# Patient Record
Sex: FEMALE | Race: BLACK OR AFRICAN AMERICAN | NOT HISPANIC OR LATINO | Employment: FULL TIME | URBAN - METROPOLITAN AREA
[De-identification: names, ages, dates, MRNs, and addresses within clinical notes are randomized per-mention and may not be internally consistent; named-entity substitution may affect disease eponyms.]

---

## 2017-07-10 ENCOUNTER — HOSPITAL ENCOUNTER (EMERGENCY)
Facility: HOSPITAL | Age: 22
Discharge: HOME | End: 2017-07-10
Attending: EMERGENCY MEDICINE
Payer: MEDICAID

## 2017-07-10 VITALS
SYSTOLIC BLOOD PRESSURE: 124 MMHG | HEIGHT: 67 IN | BODY MASS INDEX: 25.11 KG/M2 | WEIGHT: 160 LBS | TEMPERATURE: 99 F | RESPIRATION RATE: 10 BRPM | HEART RATE: 92 BPM | DIASTOLIC BLOOD PRESSURE: 72 MMHG | OXYGEN SATURATION: 100 %

## 2017-07-10 DIAGNOSIS — R11.2 NAUSEA AND VOMITING, INTRACTABILITY OF VOMITING NOT SPECIFIED, UNSPECIFIED VOMITING TYPE: ICD-10-CM

## 2017-07-10 DIAGNOSIS — R51.9 NONINTRACTABLE HEADACHE, UNSPECIFIED CHRONICITY PATTERN, UNSPECIFIED HEADACHE TYPE: Primary | ICD-10-CM

## 2017-07-10 DIAGNOSIS — R19.7 DIARRHEA, UNSPECIFIED TYPE: ICD-10-CM

## 2017-07-10 LAB
B-HCG UR QL: NEGATIVE
CTP QC/QA: YES

## 2017-07-10 PROCEDURE — 99283 EMERGENCY DEPT VISIT LOW MDM: CPT | Mod: 25

## 2017-07-10 PROCEDURE — 81025 URINE PREGNANCY TEST: CPT | Performed by: PHYSICIAN ASSISTANT

## 2017-07-10 PROCEDURE — 25000003 PHARM REV CODE 250: Performed by: PHYSICIAN ASSISTANT

## 2017-07-10 RX ORDER — BUTALBITAL, ACETAMINOPHEN AND CAFFEINE 50; 325; 40 MG/1; MG/1; MG/1
1 TABLET ORAL
Status: COMPLETED | OUTPATIENT
Start: 2017-07-10 | End: 2017-07-10

## 2017-07-10 RX ORDER — ONDANSETRON 8 MG/1
8 TABLET, ORALLY DISINTEGRATING ORAL 3 TIMES DAILY PRN
Qty: 20 TABLET | Refills: 0 | Status: SHIPPED | OUTPATIENT
Start: 2017-07-10 | End: 2018-10-20

## 2017-07-10 RX ORDER — ONDANSETRON 4 MG/1
4 TABLET, ORALLY DISINTEGRATING ORAL
Status: COMPLETED | OUTPATIENT
Start: 2017-07-10 | End: 2017-07-10

## 2017-07-10 RX ORDER — BUTALBITAL, ACETAMINOPHEN AND CAFFEINE 50; 325; 40 MG/1; MG/1; MG/1
1 TABLET ORAL EVERY 4 HOURS PRN
Qty: 12 TABLET | Refills: 0 | Status: SHIPPED | OUTPATIENT
Start: 2017-07-10 | End: 2017-08-09

## 2017-07-10 RX ADMIN — BUTALBITAL, ACETAMINOPHEN, AND CAFFEINE 1 TABLET: 50; 325; 40 TABLET ORAL at 04:07

## 2017-07-10 RX ADMIN — ONDANSETRON 4 MG: 4 TABLET, ORALLY DISINTEGRATING ORAL at 04:07

## 2017-07-10 NOTE — ED PROVIDER NOTES
Encounter Date: 7/10/2017    SCRIBE #1 NOTE: I, Gena Garcia, am scribing for, and in the presence of, EVA Estes.       History     Chief Complaint   Patient presents with    Headache     since yesterday with nausea, diarrhea       07/10/2017 4:24 PM     Chief Complaint: Headache      The patient is a 21 y.o. female with anxiety who presents to the ED concerned for a migraine with an onset of a persistent bilateral temporal HA for the last couple of hours with associated photophobia. She took 2 Ibuprofen with no relief. The patient also endorses nausea, vomiting, and diarrhea since yesterday. She reports a positive sick contact (sister) with the same symptoms but without a fever. Her last menstrual cycle was 2 weeks ago. The patient denies prior similar symptoms, history of HA or migraines fever, abdominal pain, blood in stool, neck pain, or any other symptoms at this time. No SHx noted.      The history is provided by the patient.     Review of patient's allergies indicates:  No Known Allergies  Past Medical History:   Diagnosis Date    Anxiety     Asthma     Eczema      History reviewed. No pertinent surgical history.  History reviewed. No pertinent family history.  Social History   Substance Use Topics    Smoking status: Never Smoker    Smokeless tobacco: Never Used    Alcohol use Not on file     Review of Systems   Constitutional: Negative for chills and fever.   HENT: Negative for nosebleeds.    Eyes: Positive for photophobia. Negative for visual disturbance.   Respiratory: Negative for cough and shortness of breath.    Cardiovascular: Negative for chest pain and palpitations.   Gastrointestinal: Positive for diarrhea, nausea and vomiting. Negative for abdominal pain and blood in stool.   Genitourinary: Negative for dysuria and hematuria.   Musculoskeletal: Negative for back pain and neck pain.   Skin: Negative for rash.   Neurological: Positive for headaches. Negative for seizures and syncope.      Physical Exam     Initial Vitals [07/10/17 1518]   BP Pulse Resp Temp SpO2   124/72 92 10 98.6 °F (37 °C) 100 %      MAP       89.33         Physical Exam    Nursing note and vitals reviewed.  Constitutional: She appears well-developed and well-nourished. She is not diaphoretic. No distress.   HENT:   Head: Normocephalic and atraumatic.   Right Ear: External ear normal.   Left Ear: External ear normal.   Nose: Nose normal.   Mouth/Throat: Oropharynx is clear and moist.   Eyes: Conjunctivae and EOM are normal. Pupils are equal, round, and reactive to light.   Neck: Normal range of motion. Neck supple.   Cardiovascular: Normal rate, regular rhythm, normal heart sounds and intact distal pulses.   Pulmonary/Chest: Breath sounds normal. No respiratory distress. She has no wheezes. She has no rhonchi. She has no rales.   Abdominal: Soft. She exhibits no distension and no mass. There is no tenderness.   No palpable abdominal tenderness noted.   Musculoskeletal: Normal range of motion. She exhibits no edema or tenderness.   Lymphadenopathy:     She has no cervical adenopathy.   Neurological: She is alert and oriented to person, place, and time. She has normal strength. No cranial nerve deficit or sensory deficit. Coordination and gait normal.   No focal neurological deficits noted.  Cranial nerves III through XII grossly intact.  Equal, bilateral rapid alternating movement noted to upper and lower extremities.   Skin: Skin is warm and dry. No rash and no abscess noted. No erythema.       ED Course   Procedures  Labs Reviewed   POCT URINE PREGNANCY           Medical Decision Making:   History:   Old Medical Records: I decided to obtain old medical records.  Differential Diagnosis:   Viral syndrome  Headache  Gastritis  Appendicitis  Clinical Tests:   Lab Tests: Reviewed and Ordered       APC / Resident Notes:   Her symptoms have improved with Fioricet and Zofran here in the emergency department.  She declines IV fluids  and IV medication, stating that it would be difficult for us to find her veins.  Low clinical suspicion for meningitis or acute intracranial process we do not feel any further imaging or testing is necessary at this time.  Her vital signs remained stable.  We feel comfortable discharging her home to follow-up with her primary care provider for reevaluation in the next couple of days as needed.  She is given a prescription for Fioricet and Zofran to take if symptoms recur.  She is given specific return precautions.       Scribe Attestation:   Scribe #1: I performed the above scribed service and the documentation accurately describes the services I performed. I attest to the accuracy of the note.    Attending Attestation:           Physician Attestation for Scribe:  Physician Attestation Statement for Scribe #1: I, EVA Estes, reviewed documentation, as scribed by Gena Garcia in my presence, and it is both accurate and complete.                 ED Course     Clinical Impression:   No diagnosis found.                             Marisela Estes PA-C  07/10/17 4113

## 2019-02-12 ENCOUNTER — HOSPITAL ENCOUNTER (EMERGENCY)
Facility: HOSPITAL | Age: 24
Discharge: HOME | End: 2019-02-12
Attending: EMERGENCY MEDICINE
Payer: MEDICAID

## 2019-02-12 VITALS
DIASTOLIC BLOOD PRESSURE: 78 MMHG | HEART RATE: 100 BPM | RESPIRATION RATE: 22 BRPM | SYSTOLIC BLOOD PRESSURE: 128 MMHG | TEMPERATURE: 98 F | OXYGEN SATURATION: 100 %

## 2019-02-12 DIAGNOSIS — F41.9 ANXIETY: Primary | ICD-10-CM

## 2019-02-12 LAB
B-HCG UR QL: NEGATIVE
CTP QC/QA: YES

## 2019-02-12 PROCEDURE — 25000003 PHARM REV CODE 250: Performed by: PHYSICIAN ASSISTANT

## 2019-02-12 PROCEDURE — 99283 EMERGENCY DEPT VISIT LOW MDM: CPT | Mod: 25

## 2019-02-12 PROCEDURE — 99284 PR EMERGENCY DEPT VISIT,LEVEL IV: ICD-10-PCS | Mod: ,,, | Performed by: PHYSICIAN ASSISTANT

## 2019-02-12 PROCEDURE — 81025 URINE PREGNANCY TEST: CPT | Performed by: PHYSICIAN ASSISTANT

## 2019-02-12 PROCEDURE — 99284 EMERGENCY DEPT VISIT MOD MDM: CPT | Mod: ,,, | Performed by: PHYSICIAN ASSISTANT

## 2019-02-12 RX ORDER — HYDROXYZINE PAMOATE 25 MG/1
25 CAPSULE ORAL
Status: COMPLETED | OUTPATIENT
Start: 2019-02-12 | End: 2019-02-12

## 2019-02-12 RX ADMIN — HYDROXYZINE PAMOATE 25 MG: 25 CAPSULE ORAL at 04:02

## 2019-02-12 NOTE — ED TRIAGE NOTES
Allison Shaikh, a 23 y.o. female presents to the ED w/ complaint of anxiety. Pt was at home sleeping when she awoke and became short of breath. Pt became extremely anxious and has not been able to calm down. Pt complains of mild SOB. Denies CP, SI, HI.     Triage note:  Chief Complaint   Patient presents with    Anxiety     Pt presents to ED via EMS c/o feeling anxious. Pt has been unable to fill her script for her anxiety medication.      Review of patient's allergies indicates:  No Known Allergies  Past Medical History:   Diagnosis Date    Anxiety     Asthma     Eczema

## 2019-02-12 NOTE — ED PROVIDER NOTES
Encounter Date: 2/12/2019       History     Chief Complaint   Patient presents with    Anxiety     Pt presents to ED via EMS c/o feeling anxious. Pt has been unable to fill her script for her anxiety medication.      Patient is a 23 year old female with PMHx of asthma, eczema, and anxiety. She presents to the ED via EMS for anxiety. She reports having a panic attack onset two hours ago. Denies SI, HI, or AVH. Denies hx of inpatient hospitalizations. She denies fever,chills, nausea, vomiting, sob, chest pain, abd pain, dysuria, diarrhea, or constipation. She is a non smoker and denies alcohol use.          Review of patient's allergies indicates:  No Known Allergies  Past Medical History:   Diagnosis Date    Anxiety     Asthma     Eczema      History reviewed. No pertinent surgical history.  History reviewed. No pertinent family history.  Social History     Tobacco Use    Smoking status: Never Smoker    Smokeless tobacco: Never Used   Substance Use Topics    Alcohol use: No     Frequency: Never    Drug use: No     Review of Systems   Constitutional: Negative for fever.   HENT: Negative for sore throat.    Respiratory: Negative for shortness of breath.    Cardiovascular: Negative for chest pain.   Gastrointestinal: Negative for abdominal pain, nausea and vomiting.   Genitourinary: Negative for dysuria.   Musculoskeletal: Negative for back pain.   Skin: Negative for rash.   Neurological: Negative for weakness.   Hematological: Does not bruise/bleed easily.   Psychiatric/Behavioral: The patient is nervous/anxious.        Physical Exam     Initial Vitals [02/12/19 0308]   BP Pulse Resp Temp SpO2   128/78 100 (!) 22 98.4 °F (36.9 °C) 100 %      MAP       --         Physical Exam    Vitals reviewed.  Constitutional: She appears well-developed and well-nourished. No distress.   HENT:   Head: Normocephalic.   Eyes: Conjunctivae are normal.   Neck: Normal range of motion.   Cardiovascular: Normal rate and regular  rhythm.   No murmur heard.  Pulmonary/Chest: Breath sounds normal. No respiratory distress. She has no wheezes. She has no rales.   Musculoskeletal: Normal range of motion.   Neurological: She is alert and oriented to person, place, and time.   Skin: Skin is warm and dry. No erythema.   Psychiatric: Her speech is normal and behavior is normal. Thought content normal. Her mood appears anxious. She expresses no homicidal and no suicidal ideation. She expresses no suicidal plans and no homicidal plans.         ED Course   Procedures  Labs Reviewed   POCT URINE PREGNANCY                   APC / Resident Notes:   Patient is a 23 year old female presents to the ED for emergent evaluation of anxiety.     Will order anti-anxiety medication for symptomatic relief. UPT negative. Will continue to monitor.     Differential diagnoses include, but are not limited to: generalized anxiety disorder, panic disorder, asthma, or pregnancy.     Patient reassessed, reports symptoms improved with ED management. I have discussed emergency department findings, and plan with the patient. Will discharge home with F/U with PCP. Patient verbalizes understanding of plan and agrees. Return precautions given.     I have discussed and reviewed with my supervising physician.        Clinical Impression:   The encounter diagnosis was Anxiety.      Disposition:   Disposition: Discharged  Condition: Stable                        Marycruz Rush PA-C  02/12/19 9117

## 2019-10-11 ENCOUNTER — HOSPITAL ENCOUNTER (EMERGENCY)
Facility: HOSPITAL | Age: 24
Discharge: HOME | End: 2019-10-11
Attending: EMERGENCY MEDICINE

## 2019-10-11 VITALS
DIASTOLIC BLOOD PRESSURE: 76 MMHG | WEIGHT: 155 LBS | TEMPERATURE: 98 F | HEART RATE: 68 BPM | BODY MASS INDEX: 24.91 KG/M2 | OXYGEN SATURATION: 100 % | RESPIRATION RATE: 16 BRPM | HEIGHT: 66 IN | SYSTOLIC BLOOD PRESSURE: 114 MMHG

## 2019-10-11 DIAGNOSIS — J02.9 VIRAL PHARYNGITIS: Primary | ICD-10-CM

## 2019-10-11 LAB
B-HCG UR QL: NEGATIVE
CTP QC/QA: YES
DEPRECATED S PYO AG THROAT QL EIA: NEGATIVE
INFLUENZA A, MOLECULAR: NEGATIVE
INFLUENZA B, MOLECULAR: NEGATIVE
SPECIMEN SOURCE: NORMAL

## 2019-10-11 PROCEDURE — 87081 CULTURE SCREEN ONLY: CPT

## 2019-10-11 PROCEDURE — 81025 URINE PREGNANCY TEST: CPT | Performed by: EMERGENCY MEDICINE

## 2019-10-11 PROCEDURE — 87502 INFLUENZA DNA AMP PROBE: CPT

## 2019-10-11 PROCEDURE — 99282 EMERGENCY DEPT VISIT SF MDM: CPT

## 2019-10-11 PROCEDURE — 87880 STREP A ASSAY W/OPTIC: CPT

## 2019-10-11 PROCEDURE — 25000003 PHARM REV CODE 250: Performed by: EMERGENCY MEDICINE

## 2019-10-11 RX ORDER — ACETAMINOPHEN 500 MG
1000 TABLET ORAL
Status: COMPLETED | OUTPATIENT
Start: 2019-10-11 | End: 2019-10-11

## 2019-10-11 RX ADMIN — ACETAMINOPHEN 1000 MG: 500 TABLET, FILM COATED ORAL at 10:10

## 2019-10-11 NOTE — ED PROVIDER NOTES
Encounter Date: 10/11/2019       History     Chief Complaint   Patient presents with    Sore Throat     [place]     24-year-old female presented emergency department with complaints of sore throat and headache which started last night.  Patient denies fever or chills or nausea or vomiting or chest pain or shortness of breath. Patient denies any neck stiffness or photophobia.  Patient describes this as a moderate headache which is gradual onset.  Denies any fever.  Denies abdominal pain or dysuria or hematuria or weakness or numbness.        Review of patient's allergies indicates:  No Known Allergies  Past Medical History:   Diagnosis Date    Anxiety     Asthma     Eczema      History reviewed. No pertinent surgical history.  No family history on file.  Social History     Tobacco Use    Smoking status: Never Smoker    Smokeless tobacco: Never Used   Substance Use Topics    Alcohol use: No     Frequency: Never    Drug use: No     Review of Systems   Constitutional: Negative.    HENT: Positive for sore throat.    Eyes: Negative.    Respiratory: Negative.    Cardiovascular: Negative.  Negative for chest pain.   Gastrointestinal: Negative.    Endocrine: Negative.    Genitourinary: Negative.    Musculoskeletal: Negative.    Skin: Negative.    Allergic/Immunologic: Negative.    Neurological: Positive for headaches. Negative for dizziness, tremors, seizures, syncope, facial asymmetry, speech difficulty and weakness.   Hematological: Negative.    Psychiatric/Behavioral: Negative.    All other systems reviewed and are negative.      Physical Exam     Initial Vitals [10/11/19 0840]   BP Pulse Resp Temp SpO2   110/76 76 16 99 °F (37.2 °C) 100 %      MAP       --         Physical Exam    Nursing note and vitals reviewed.  Constitutional: She appears well-developed and well-nourished.   HENT:   Head: Normocephalic and atraumatic.   Nose: Nose normal.   Mouth/Throat: Oropharyngeal exudate present.   Bilateral  tonsillar exudates   Eyes: Conjunctivae and EOM are normal. Pupils are equal, round, and reactive to light.   Neck: Normal range of motion. Neck supple. No thyromegaly present. No tracheal deviation present. No JVD present.   Cardiovascular: Normal rate, regular rhythm, normal heart sounds and intact distal pulses.   No murmur heard.  Pulmonary/Chest: Breath sounds normal. No stridor. No respiratory distress. She has no wheezes. She has no rales.   Abdominal: Soft. Bowel sounds are normal. She exhibits no distension. There is no tenderness.   Musculoskeletal: Normal range of motion. She exhibits no edema.   Neurological: She is alert and oriented to person, place, and time. She has normal strength. No cranial nerve deficit or sensory deficit. GCS score is 15. GCS eye subscore is 4. GCS verbal subscore is 5. GCS motor subscore is 6.   No photophobia.  No neck stiffness no meningeal signs   Skin: Skin is warm and dry. Capillary refill takes less than 2 seconds.   Psychiatric: She has a normal mood and affect. Thought content normal.         ED Course   Procedures  Labs Reviewed   THROAT SCREEN, RAPID   INFLUENZA A AND B ANTIGEN   POCT URINE PREGNANCY          Imaging Results    None          Medical Decision Making:   Differential Diagnosis:   24-year-old female presented emergency department with pharyngitis.  Presentation consistent with viral pharyngitis with mild headache.  Symptoms resolved with the treatment.  Strep screen and flu screen negative. Discharged with instructions and follow-up  Clinical Tests:   Lab Tests: Reviewed                      Clinical Impression:       ICD-10-CM ICD-9-CM   1. Viral pharyngitis J02.9 462                                Carlos Daniels MD  10/11/19 1211

## 2019-10-13 LAB — BACTERIA THROAT CULT: NORMAL

## 2019-12-18 ENCOUNTER — HOSPITAL ENCOUNTER (EMERGENCY)
Facility: HOSPITAL | Age: 24
Discharge: HOME | End: 2019-12-19
Attending: EMERGENCY MEDICINE

## 2019-12-18 DIAGNOSIS — R10.9 ABDOMINAL PAIN, UNSPECIFIED ABDOMINAL LOCATION: Primary | ICD-10-CM

## 2019-12-18 DIAGNOSIS — R11.2 NON-INTRACTABLE VOMITING WITH NAUSEA, UNSPECIFIED VOMITING TYPE: ICD-10-CM

## 2019-12-18 LAB
ALBUMIN SERPL BCP-MCNC: 4.2 G/DL (ref 3.5–5.2)
ALP SERPL-CCNC: 73 U/L (ref 55–135)
ALT SERPL W/O P-5'-P-CCNC: 12 U/L (ref 10–44)
ANION GAP SERPL CALC-SCNC: 8 MMOL/L (ref 8–16)
AST SERPL-CCNC: 20 U/L (ref 10–40)
B-HCG UR QL: NEGATIVE
BACTERIA #/AREA URNS HPF: NEGATIVE /HPF
BASOPHILS # BLD AUTO: 0.03 K/UL (ref 0–0.2)
BASOPHILS NFR BLD: 0.2 % (ref 0–1.9)
BILIRUB SERPL-MCNC: 0.6 MG/DL (ref 0.1–1)
BILIRUB UR QL STRIP: NEGATIVE
BUN SERPL-MCNC: 7 MG/DL (ref 6–20)
CALCIUM SERPL-MCNC: 8.6 MG/DL (ref 8.7–10.5)
CHLORIDE SERPL-SCNC: 104 MMOL/L (ref 95–110)
CLARITY UR: CLEAR
CO2 SERPL-SCNC: 25 MMOL/L (ref 23–29)
COLOR UR: YELLOW
CREAT SERPL-MCNC: 0.7 MG/DL (ref 0.5–1.4)
CTP QC/QA: YES
DIFFERENTIAL METHOD: ABNORMAL
EOSINOPHIL # BLD AUTO: 0 K/UL (ref 0–0.5)
EOSINOPHIL NFR BLD: 0.3 % (ref 0–8)
ERYTHROCYTE [DISTWIDTH] IN BLOOD BY AUTOMATED COUNT: 17.2 % (ref 11.5–14.5)
EST. GFR  (AFRICAN AMERICAN): >60 ML/MIN/1.73 M^2
EST. GFR  (NON AFRICAN AMERICAN): >60 ML/MIN/1.73 M^2
GLUCOSE SERPL-MCNC: 77 MG/DL (ref 70–110)
GLUCOSE UR QL STRIP: NEGATIVE
HCT VFR BLD AUTO: 32.6 % (ref 37–48.5)
HGB BLD-MCNC: 9.7 G/DL (ref 12–16)
HGB UR QL STRIP: NEGATIVE
HYALINE CASTS #/AREA URNS LPF: 6 /LPF
IMM GRANULOCYTES # BLD AUTO: 0.04 K/UL (ref 0–0.04)
IMM GRANULOCYTES NFR BLD AUTO: 0.3 % (ref 0–0.5)
KETONES UR QL STRIP: NEGATIVE
LEUKOCYTE ESTERASE UR QL STRIP: ABNORMAL
LIPASE SERPL-CCNC: 23 U/L (ref 4–60)
LYMPHOCYTES # BLD AUTO: 1.4 K/UL (ref 1–4.8)
LYMPHOCYTES NFR BLD: 10.4 % (ref 18–48)
MCH RBC QN AUTO: 20.8 PG (ref 27–31)
MCHC RBC AUTO-ENTMCNC: 29.8 G/DL (ref 32–36)
MCV RBC AUTO: 70 FL (ref 82–98)
MICROSCOPIC COMMENT: ABNORMAL
MONOCYTES # BLD AUTO: 0.9 K/UL (ref 0.3–1)
MONOCYTES NFR BLD: 6.7 % (ref 4–15)
NEUTROPHILS # BLD AUTO: 11.2 K/UL (ref 1.8–7.7)
NEUTROPHILS NFR BLD: 82.1 % (ref 38–73)
NITRITE UR QL STRIP: NEGATIVE
NRBC BLD-RTO: 0 /100 WBC
PH UR STRIP: 6 [PH] (ref 5–8)
PLATELET # BLD AUTO: 340 K/UL (ref 150–350)
PMV BLD AUTO: 11.1 FL (ref 9.2–12.9)
POTASSIUM SERPL-SCNC: 3.4 MMOL/L (ref 3.5–5.1)
PROT SERPL-MCNC: 8.4 G/DL (ref 6–8.4)
PROT UR QL STRIP: ABNORMAL
RBC # BLD AUTO: 4.67 M/UL (ref 4–5.4)
RBC #/AREA URNS HPF: 1 /HPF (ref 0–4)
SODIUM SERPL-SCNC: 137 MMOL/L (ref 136–145)
SP GR UR STRIP: 1.03 (ref 1–1.03)
SQUAMOUS #/AREA URNS HPF: 6 /HPF
URN SPEC COLLECT METH UR: ABNORMAL
UROBILINOGEN UR STRIP-ACNC: NEGATIVE EU/DL
WBC # BLD AUTO: 13.67 K/UL (ref 3.9–12.7)
WBC #/AREA URNS HPF: 6 /HPF (ref 0–5)

## 2019-12-18 PROCEDURE — 96375 TX/PRO/DX INJ NEW DRUG ADDON: CPT

## 2019-12-18 PROCEDURE — 99285 EMERGENCY DEPT VISIT HI MDM: CPT | Mod: 25

## 2019-12-18 PROCEDURE — 63600175 PHARM REV CODE 636 W HCPCS: Performed by: NURSE PRACTITIONER

## 2019-12-18 PROCEDURE — 25500020 PHARM REV CODE 255: Performed by: NURSE PRACTITIONER

## 2019-12-18 PROCEDURE — 81001 URINALYSIS AUTO W/SCOPE: CPT

## 2019-12-18 PROCEDURE — 85025 COMPLETE CBC W/AUTO DIFF WBC: CPT

## 2019-12-18 PROCEDURE — 81025 URINE PREGNANCY TEST: CPT | Performed by: NURSE PRACTITIONER

## 2019-12-18 PROCEDURE — 96374 THER/PROPH/DIAG INJ IV PUSH: CPT

## 2019-12-18 PROCEDURE — 83690 ASSAY OF LIPASE: CPT

## 2019-12-18 PROCEDURE — 80053 COMPREHEN METABOLIC PANEL: CPT

## 2019-12-18 RX ORDER — HYDROMORPHONE HYDROCHLORIDE 1 MG/ML
1 INJECTION, SOLUTION INTRAMUSCULAR; INTRAVENOUS; SUBCUTANEOUS
Status: COMPLETED | OUTPATIENT
Start: 2019-12-18 | End: 2019-12-18

## 2019-12-18 RX ORDER — ONDANSETRON 2 MG/ML
4 INJECTION INTRAMUSCULAR; INTRAVENOUS
Status: COMPLETED | OUTPATIENT
Start: 2019-12-18 | End: 2019-12-18

## 2019-12-18 RX ADMIN — HYDROMORPHONE HYDROCHLORIDE 1 MG: 1 INJECTION, SOLUTION INTRAMUSCULAR; INTRAVENOUS; SUBCUTANEOUS at 08:12

## 2019-12-18 RX ADMIN — ONDANSETRON 4 MG: 2 INJECTION INTRAMUSCULAR; INTRAVENOUS at 08:12

## 2019-12-18 RX ADMIN — IOHEXOL 100 ML: 350 INJECTION, SOLUTION INTRAVENOUS at 10:12

## 2019-12-19 VITALS
TEMPERATURE: 98 F | OXYGEN SATURATION: 100 % | HEART RATE: 96 BPM | SYSTOLIC BLOOD PRESSURE: 103 MMHG | DIASTOLIC BLOOD PRESSURE: 59 MMHG

## 2019-12-19 PROCEDURE — 25000003 PHARM REV CODE 250: Performed by: EMERGENCY MEDICINE

## 2019-12-19 PROCEDURE — 63600175 PHARM REV CODE 636 W HCPCS: Performed by: EMERGENCY MEDICINE

## 2019-12-19 PROCEDURE — 96376 TX/PRO/DX INJ SAME DRUG ADON: CPT

## 2019-12-19 RX ORDER — ONDANSETRON 4 MG/1
4 TABLET, ORALLY DISINTEGRATING ORAL EVERY 8 HOURS PRN
Qty: 12 TABLET | Refills: 0 | Status: ON HOLD | OUTPATIENT
Start: 2019-12-19 | End: 2022-01-13 | Stop reason: HOSPADM

## 2019-12-19 RX ORDER — ONDANSETRON 2 MG/ML
4 INJECTION INTRAMUSCULAR; INTRAVENOUS
Status: COMPLETED | OUTPATIENT
Start: 2019-12-19 | End: 2019-12-19

## 2019-12-19 RX ADMIN — ONDANSETRON 4 MG: 2 INJECTION INTRAMUSCULAR; INTRAVENOUS at 12:12

## 2019-12-19 RX ADMIN — ALUMINUM HYDROXIDE, MAGNESIUM HYDROXIDE, AND SIMETHICONE 50 ML: 200; 200; 20 SUSPENSION ORAL at 12:12

## 2019-12-19 NOTE — ED PROVIDER NOTES
Encounter Date: 12/18/2019       History     Chief Complaint   Patient presents with    Abdominal Pain     Lower abdominal pain since this morning.     Presents with complaint of generalized abdominal pain  Onset this AM  Denies NVD or fever         Review of patient's allergies indicates:  No Known Allergies  Past Medical History:   Diagnosis Date    Anxiety     Asthma     Eczema      No past surgical history on file.  No family history on file.  Social History     Tobacco Use    Smoking status: Never Smoker    Smokeless tobacco: Never Used   Substance Use Topics    Alcohol use: No     Frequency: Never    Drug use: No     Review of Systems   Constitutional: Negative for fever.   Respiratory: Negative for cough, shortness of breath and wheezing.    Cardiovascular: Negative for chest pain, palpitations and leg swelling.   Gastrointestinal: Positive for abdominal pain. Negative for constipation, diarrhea, nausea and vomiting.   Genitourinary: Negative for dysuria.   Musculoskeletal: Negative for back pain.   Skin: Negative for rash.   Neurological: Negative for weakness.       Physical Exam     Initial Vitals   BP Pulse Resp Temp SpO2   12/18/19 1923 12/18/19 1923 12/18/19 1923 12/18/19 1923 12/18/19 2102   (P) 115/75 (P) 96 (P) 18 (P) 99.1 °F (37.3 °C) 100 %      MAP       --                Physical Exam    Constitutional: She appears well-developed and well-nourished.   HENT:   Head: Normocephalic and atraumatic.   Eyes: Conjunctivae are normal.   Neck: Normal range of motion.   Cardiovascular: Normal rate and regular rhythm.   Pulmonary/Chest: Breath sounds normal. No respiratory distress.   Abdominal: Soft. Bowel sounds are normal. She exhibits no distension. There is tenderness. There is rebound.   Musculoskeletal: Normal range of motion.   Neurological: She is alert and oriented to person, place, and time. No sensory deficit. GCS score is 15. GCS eye subscore is 4. GCS verbal subscore is 5. GCS motor  subscore is 6.   Skin: Skin is warm and dry.   Psychiatric: She has a normal mood and affect. Thought content normal.         ED Course   Procedures  Labs Reviewed   CBC W/ AUTO DIFFERENTIAL - Abnormal; Notable for the following components:       Result Value    WBC 13.67 (*)     Hemoglobin 9.7 (*)     Hematocrit 32.6 (*)     Mean Corpuscular Volume 70 (*)     Mean Corpuscular Hemoglobin 20.8 (*)     Mean Corpuscular Hemoglobin Conc 29.8 (*)     RDW 17.2 (*)     Gran # (ANC) 11.2 (*)     Gran% 82.1 (*)     Lymph% 10.4 (*)     All other components within normal limits   COMPREHENSIVE METABOLIC PANEL - Abnormal; Notable for the following components:    Potassium 3.4 (*)     Calcium 8.6 (*)     All other components within normal limits   URINALYSIS, REFLEX TO URINE CULTURE - Abnormal; Notable for the following components:    Protein, UA Trace (*)     Leukocytes, UA Trace (*)     All other components within normal limits    Narrative:     Preferred Collection Type->Urine, Clean Catch  Specimen Source->Urine   URINALYSIS MICROSCOPIC - Abnormal; Notable for the following components:    WBC, UA 6 (*)     Hyaline Casts, UA 6 (*)     All other components within normal limits    Narrative:     Preferred Collection Type->Urine, Clean Catch  Specimen Source->Urine   LIPASE   POCT URINE PREGNANCY          Imaging Results          CT Abdomen Pelvis With Contrast (Final result)  Result time 12/18/19 22:17:55    Final result by Andrés Lopez MD (12/18/19 22:17:55)                 Narrative:        Exam: CT OF THE ABDOMEN/PELVIS WITH IV AND RECTAL CONTRAST    Clinical data: Abdominal pain.    Technique: Direct contiguous axial CT images were acquired through the abdomen  and pelvis with intravenous contrast using soft tissue and bone algorithms.  Rectal contrast was administered. Reformatted/MPR images were performed.  Contrast used: Omnipaque 350. Amount: 100 mL.  Radiation dose:  CTDIvol = 6.50  mGy, DLP = 346.20 mGy x  cm.    Limitations: None.    Prior studies: No prior studies submitted.    Findings: Lung bases:  Clear.    Liver:   Mildly enlarged in size, measuring 16 cm in greatest craniocaudal  dimension.  Unremarkable contour. Normal density. No evidence of mass. No  evidence of dilated ducts.    Gallbladder:  Unremarkable    Spleen:  Grossly unremarkable.    Pancreas/adrenal glands:   Grossly unremarkable size, contour and density.    Kidneys:   In anatomic position. Grossly unremarkable renal size, contour and  density. No renal or ureteral calculi. No evidence of a renal mass or cyst.  Perinephric space is unremarkable.    Retroperitoneum: No enlarged retroperitoneal lymphadenopathy. The aorta and IVC  appear unremarkable.    Peritoneal cavity:  No evidence of free air or ascites.    Gastrointestinal tract: No obstruction.    Appendix:  Unremarkable    Pelvis: A cystic lesion is identified in the left ovary, measuring 2.3 cm.  Otherwise the solid and hollow viscera grossly unremarkable.    Osseous structures:  No acute or destructive bony process identified.    IMPRESSION:    1.  No acute intra-abdominal pathology.    2.  Mild hepatomegaly.    3.  Cystic lesion in the left ovary, likely representing a dominant  follicle/simple cyst.  Correlation with ultrasound may be performed.      Recommendation: Follow up as clinically indicated.    All CT scans at this facility utilize dose modulation, iterative reconstruction,  and/or weight based dosing when appropriate to reduce radiation dose to as low  as reasonably achievable.      Electronically Signed by MONI MUÑOZ MD at 12/19/2019 12:52:11 AM                               Medical Decision Making:   Initial Assessment:   Generalized abdominal pain without NVD or fever  Transition of care to Dr. Golden              [place] of Care:  Assumed care from NP pending CTAP.  Agree with their assessment and plan unless otherwise noted. Reassessed.  Patient lying in bed in no  acute distress. States she feels better and is tolerating p.o..  Abdomen soft, mild epigastric tenderness. No Romano's.  Lab showed WBC 13.7.  BUN 7, creatinine 0.7.  Lipase 23.  UA shows RBC 1, WBC 6, bacteria negative. CT AP shows no acute abnormality.  Cyst in left ovary.  Suspect gastritis.  Low suspicion for bacterial infection, cholecystitis, or early/occult appendicitis.  Will prescribe Zofran.  Recommend follow-up with PCP for repeat evaluation.  Given return precautions.  Patient understands the plan.    Peter Golden MD  Emergency Medicine  12/19/2019 1:30 AM                        Clinical Impression:       ICD-10-CM ICD-9-CM   1. Abdominal pain, unspecified abdominal location R10.9 789.00   2. Non-intractable vomiting with nausea, unspecified vomiting type R11.2 787.01                             Peter Golden MD  12/19/19 0133

## 2020-02-22 ENCOUNTER — HOSPITAL ENCOUNTER (EMERGENCY)
Facility: HOSPITAL | Age: 25
Discharge: HOME | End: 2020-02-22
Attending: EMERGENCY MEDICINE

## 2020-02-22 VITALS
OXYGEN SATURATION: 100 % | HEIGHT: 67 IN | DIASTOLIC BLOOD PRESSURE: 75 MMHG | BODY MASS INDEX: 25.11 KG/M2 | TEMPERATURE: 99 F | RESPIRATION RATE: 18 BRPM | WEIGHT: 160 LBS | HEART RATE: 77 BPM | SYSTOLIC BLOOD PRESSURE: 121 MMHG

## 2020-02-22 DIAGNOSIS — D64.9 ANEMIA, UNSPECIFIED TYPE: ICD-10-CM

## 2020-02-22 DIAGNOSIS — R52 BODY ACHES: ICD-10-CM

## 2020-02-22 DIAGNOSIS — R05.9 COUGH: Primary | ICD-10-CM

## 2020-02-22 LAB
ANION GAP SERPL CALC-SCNC: 10 MMOL/L (ref 8–16)
B-HCG UR QL: NEGATIVE
BASOPHILS # BLD AUTO: 0.06 K/UL (ref 0–0.2)
BASOPHILS NFR BLD: 1.1 % (ref 0–1.9)
BILIRUB UR QL STRIP: NEGATIVE
BUN SERPL-MCNC: 11 MG/DL (ref 6–20)
CALCIUM SERPL-MCNC: 8.6 MG/DL (ref 8.7–10.5)
CHLORIDE SERPL-SCNC: 101 MMOL/L (ref 95–110)
CLARITY UR: CLEAR
CO2 SERPL-SCNC: 26 MMOL/L (ref 23–29)
COLOR UR: YELLOW
CREAT SERPL-MCNC: 0.6 MG/DL (ref 0.5–1.4)
CTP QC/QA: YES
DIFFERENTIAL METHOD: ABNORMAL
EOSINOPHIL # BLD AUTO: 0.1 K/UL (ref 0–0.5)
EOSINOPHIL NFR BLD: 1.1 % (ref 0–8)
ERYTHROCYTE [DISTWIDTH] IN BLOOD BY AUTOMATED COUNT: 18.8 % (ref 11.5–14.5)
EST. GFR  (AFRICAN AMERICAN): >60 ML/MIN/1.73 M^2
EST. GFR  (NON AFRICAN AMERICAN): >60 ML/MIN/1.73 M^2
GLUCOSE SERPL-MCNC: 85 MG/DL (ref 70–110)
GLUCOSE UR QL STRIP: NEGATIVE
HCT VFR BLD AUTO: 31 % (ref 37–48.5)
HGB BLD-MCNC: 9.3 G/DL (ref 12–16)
HGB UR QL STRIP: NEGATIVE
IMM GRANULOCYTES # BLD AUTO: 0.01 K/UL (ref 0–0.04)
IMM GRANULOCYTES NFR BLD AUTO: 0.2 % (ref 0–0.5)
INFLUENZA A, MOLECULAR: NEGATIVE
INFLUENZA B, MOLECULAR: NEGATIVE
KETONES UR QL STRIP: NEGATIVE
LEUKOCYTE ESTERASE UR QL STRIP: NEGATIVE
LYMPHOCYTES # BLD AUTO: 2 K/UL (ref 1–4.8)
LYMPHOCYTES NFR BLD: 37.7 % (ref 18–48)
MCH RBC QN AUTO: 21.3 PG (ref 27–31)
MCHC RBC AUTO-ENTMCNC: 30 G/DL (ref 32–36)
MCV RBC AUTO: 71 FL (ref 82–98)
MONOCYTES # BLD AUTO: 0.6 K/UL (ref 0.3–1)
MONOCYTES NFR BLD: 10.9 % (ref 4–15)
NEUTROPHILS # BLD AUTO: 2.6 K/UL (ref 1.8–7.7)
NEUTROPHILS NFR BLD: 49 % (ref 38–73)
NITRITE UR QL STRIP: NEGATIVE
NRBC BLD-RTO: 0 /100 WBC
PH UR STRIP: 8 [PH] (ref 5–8)
PLATELET # BLD AUTO: 322 K/UL (ref 150–350)
PMV BLD AUTO: 10.4 FL (ref 9.2–12.9)
POTASSIUM SERPL-SCNC: 3.9 MMOL/L (ref 3.5–5.1)
PROT UR QL STRIP: ABNORMAL
RBC # BLD AUTO: 4.37 M/UL (ref 4–5.4)
SODIUM SERPL-SCNC: 137 MMOL/L (ref 136–145)
SP GR UR STRIP: >1.03 (ref 1–1.03)
SPECIMEN SOURCE: NORMAL
URN SPEC COLLECT METH UR: ABNORMAL
UROBILINOGEN UR STRIP-ACNC: ABNORMAL EU/DL
WBC # BLD AUTO: 5.3 K/UL (ref 3.9–12.7)

## 2020-02-22 PROCEDURE — 96360 HYDRATION IV INFUSION INIT: CPT

## 2020-02-22 PROCEDURE — 63600175 PHARM REV CODE 636 W HCPCS: Performed by: PHYSICIAN ASSISTANT

## 2020-02-22 PROCEDURE — 99284 EMERGENCY DEPT VISIT MOD MDM: CPT | Mod: 25

## 2020-02-22 PROCEDURE — 81003 URINALYSIS AUTO W/O SCOPE: CPT

## 2020-02-22 PROCEDURE — 80048 BASIC METABOLIC PNL TOTAL CA: CPT

## 2020-02-22 PROCEDURE — 81025 URINE PREGNANCY TEST: CPT | Performed by: PHYSICIAN ASSISTANT

## 2020-02-22 PROCEDURE — 87502 INFLUENZA DNA AMP PROBE: CPT

## 2020-02-22 PROCEDURE — 85025 COMPLETE CBC W/AUTO DIFF WBC: CPT

## 2020-02-22 RX ORDER — FERROUS SULFATE 325(65) MG
325 TABLET ORAL DAILY
Qty: 30 TABLET | Refills: 0 | Status: ON HOLD | OUTPATIENT
Start: 2020-02-22 | End: 2022-01-13 | Stop reason: HOSPADM

## 2020-02-22 RX ORDER — SODIUM CHLORIDE 9 MG/ML
1000 INJECTION, SOLUTION INTRAVENOUS
Status: COMPLETED | OUTPATIENT
Start: 2020-02-22 | End: 2020-02-22

## 2020-02-22 RX ADMIN — SODIUM CHLORIDE 1000 ML: 900 INJECTION INTRAVENOUS at 08:02

## 2020-02-23 NOTE — ED PROVIDER NOTES
Encounter Date: 2/22/2020       History     Chief Complaint   Patient presents with    Generalized Body Aches     X 1 DAY    Cough     25 yo female presenting with complaint of cough congestion and abdominal cramping also states some urinary frequency.  Patient denies any nausea vomiting diarrhea.  Patient denies any urinary symptoms denies any vaginal bleeding, States some congestion cough and body ache and abdominal cramping.          Review of patient's allergies indicates:  No Known Allergies  Past Medical History:   Diagnosis Date    Anxiety     Asthma     Eczema      No past surgical history on file.  No family history on file.  Social History     Tobacco Use    Smoking status: Never Smoker    Smokeless tobacco: Never Used   Substance Use Topics    Alcohol use: No     Frequency: Never    Drug use: No     Review of Systems   HENT: Positive for congestion. Negative for sore throat.    Respiratory: Positive for cough.    Gastrointestinal: Positive for abdominal pain.   Genitourinary: Negative for vaginal bleeding, vaginal discharge and vaginal pain.   Musculoskeletal: Positive for myalgias.   Skin: Negative.    Neurological: Negative for headaches.   All other systems reviewed and are negative.      Physical Exam     Initial Vitals [02/22/20 1757]   BP Pulse Resp Temp SpO2   119/84 78 16 98.4 °F (36.9 °C) 100 %      MAP       --         Physical Exam    Nursing note and vitals reviewed.  Constitutional: She appears well-developed and well-nourished.   HENT:   Head: Normocephalic and atraumatic.   Right Ear: External ear normal.   Left Ear: External ear normal.   Nose: Nose normal.   Mouth/Throat: Oropharynx is clear and moist. No oropharyngeal exudate.   Eyes: Conjunctivae and EOM are normal. Pupils are equal, round, and reactive to light.   Neck: Normal range of motion. Neck supple.   Cardiovascular: Normal rate, regular rhythm and normal heart sounds.   Pulmonary/Chest: Breath sounds normal.    Abdominal: Soft. Bowel sounds are normal. There is no tenderness. There is no rebound.   Musculoskeletal: Normal range of motion. She exhibits no tenderness.   Neurological: She is alert and oriented to person, place, and time. She has normal strength.   Skin: Skin is warm and dry.   Psychiatric: She has a normal mood and affect.         ED Course   Procedures  Labs Reviewed   URINALYSIS, REFLEX TO URINE CULTURE - Abnormal; Notable for the following components:       Result Value    Specific Gravity, UA >1.030 (*)     Protein, UA Trace (*)     Urobilinogen, UA 2.0-3.0 (*)     All other components within normal limits    Narrative:     Preferred Collection Type->Urine, Clean Catch  Specimen Source->Urine   CBC W/ AUTO DIFFERENTIAL - Abnormal; Notable for the following components:    Hemoglobin 9.3 (*)     Hematocrit 31.0 (*)     Mean Corpuscular Volume 71 (*)     Mean Corpuscular Hemoglobin 21.3 (*)     Mean Corpuscular Hemoglobin Conc 30.0 (*)     RDW 18.8 (*)     All other components within normal limits   BASIC METABOLIC PANEL - Abnormal; Notable for the following components:    Calcium 8.6 (*)     All other components within normal limits   INFLUENZA A AND B ANTIGEN    Narrative:     Specimen Source->Nasopharyngeal Swab   POCT URINE PREGNANCY          Imaging Results          X-Ray Chest PA And Lateral (Final result)  Result time 02/22/20 18:39:44    Final result by Lul Carmona MD (02/22/20 18:39:44)                 Impression:      Normal 2 view chest.      Electronically signed by: Lul Carmona MD  Date:    02/22/2020  Time:    18:39             Narrative:    EXAMINATION:  XR CHEST PA AND LATERAL    CLINICAL HISTORY:  Generalized body aches    COMPARISON:  May 2017    FINDINGS:  PA and lateral views demonstrate no cardiac, pulmonary, or osseous abnormalities.                                 Medical Decision Making:   Clinical Tests:   Lab Tests: Ordered and Reviewed  Radiological Study: Ordered  and Reviewed  ED Management:  Flu and cxr wnl, pt states having some abdominal cramping added blood work and IVF, patient is anemic which is chronic.  Patient states is feeling better after IV hydration.  Patient admits supposed to be on iron and not taking.  Patient advised to follow up with PMD and given rx for iron.                                   Clinical Impression:       ICD-10-CM ICD-9-CM   1. Cough R05 786.2   2. Body aches R52 780.96   3. Anemia, unspecified type D64.9 285.9         Disposition:   Disposition: Discharged  Condition: Stable                     Shaylee Golden PA-C  02/22/20 2142

## 2020-02-23 NOTE — DISCHARGE INSTRUCTIONS
Advised continue with hydration, motrin tylenol for body aches.  Advised to take iron and follow up with primary for anemia.

## 2020-06-01 ENCOUNTER — HOSPITAL ENCOUNTER (EMERGENCY)
Facility: HOSPITAL | Age: 25
Discharge: HOME | End: 2020-06-02
Attending: EMERGENCY MEDICINE
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
## 2020-06-01 DIAGNOSIS — S39.012A LUMBAR STRAIN, INITIAL ENCOUNTER: ICD-10-CM

## 2020-06-01 DIAGNOSIS — G44.319 ACUTE POST-TRAUMATIC HEADACHE, NOT INTRACTABLE: ICD-10-CM

## 2020-06-01 DIAGNOSIS — M54.2 CERVICAL SPINE PAIN: Primary | ICD-10-CM

## 2020-06-01 DIAGNOSIS — M54.6 THORACIC BACK PAIN: ICD-10-CM

## 2020-06-01 LAB
B-HCG UR QL: NEGATIVE
CTP QC/QA: YES

## 2020-06-01 PROCEDURE — 81025 URINE PREGNANCY TEST: CPT | Performed by: EMERGENCY MEDICINE

## 2020-06-01 PROCEDURE — 25000003 PHARM REV CODE 250: Performed by: EMERGENCY MEDICINE

## 2020-06-01 PROCEDURE — 99283 EMERGENCY DEPT VISIT LOW MDM: CPT | Mod: 25

## 2020-06-01 RX ORDER — METHOCARBAMOL 500 MG/1
500 TABLET, FILM COATED ORAL
Status: COMPLETED | OUTPATIENT
Start: 2020-06-01 | End: 2020-06-01

## 2020-06-01 RX ORDER — IBUPROFEN 400 MG/1
400 TABLET ORAL
Status: COMPLETED | OUTPATIENT
Start: 2020-06-01 | End: 2020-06-01

## 2020-06-01 RX ADMIN — METHOCARBAMOL TABLETS 500 MG: 500 TABLET, COATED ORAL at 11:06

## 2020-06-01 RX ADMIN — IBUPROFEN 400 MG: 400 TABLET ORAL at 11:06

## 2020-06-02 VITALS
HEART RATE: 72 BPM | DIASTOLIC BLOOD PRESSURE: 73 MMHG | BODY MASS INDEX: 23.54 KG/M2 | HEIGHT: 67 IN | TEMPERATURE: 99 F | OXYGEN SATURATION: 100 % | SYSTOLIC BLOOD PRESSURE: 109 MMHG | RESPIRATION RATE: 18 BRPM | WEIGHT: 150 LBS

## 2020-06-02 RX ORDER — METHOCARBAMOL 500 MG/1
500 TABLET, FILM COATED ORAL 3 TIMES DAILY
Qty: 15 TABLET | Refills: 0 | Status: SHIPPED | OUTPATIENT
Start: 2020-06-02 | End: 2020-06-07

## 2020-06-02 RX ORDER — IBUPROFEN 800 MG/1
800 TABLET ORAL EVERY 6 HOURS PRN
Qty: 20 TABLET | Refills: 0 | Status: SHIPPED | OUTPATIENT
Start: 2020-06-02 | End: 2020-09-30
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
## 2020-07-06 ENCOUNTER — OFFICE VISIT (OUTPATIENT)
Dept: PRIMARY CARE CLINIC | Facility: CLINIC | Age: 25
End: 2020-07-06
Payer: MEDICAID

## 2020-07-06 VITALS
OXYGEN SATURATION: 99 % | DIASTOLIC BLOOD PRESSURE: 73 MMHG | RESPIRATION RATE: 18 BRPM | SYSTOLIC BLOOD PRESSURE: 153 MMHG | HEART RATE: 76 BPM | TEMPERATURE: 98 F

## 2020-07-06 DIAGNOSIS — J02.9 SORE THROAT: Primary | ICD-10-CM

## 2020-07-06 DIAGNOSIS — R43.9 PROBLEMS WITH SMELL AND TASTE: ICD-10-CM

## 2020-07-06 PROCEDURE — U0003 INFECTIOUS AGENT DETECTION BY NUCLEIC ACID (DNA OR RNA); SEVERE ACUTE RESPIRATORY SYNDROME CORONAVIRUS 2 (SARS-COV-2) (CORONAVIRUS DISEASE [COVID-19]), AMPLIFIED PROBE TECHNIQUE, MAKING USE OF HIGH THROUGHPUT TECHNOLOGIES AS DESCRIBED BY CMS-2020-01-R: HCPCS

## 2020-07-06 PROCEDURE — 99213 OFFICE O/P EST LOW 20 MIN: CPT | Mod: S$GLB,,, | Performed by: NURSE PRACTITIONER

## 2020-07-06 PROCEDURE — 99213 PR OFFICE/OUTPT VISIT, EST, LEVL III, 20-29 MIN: ICD-10-PCS | Mod: S$GLB,,, | Performed by: NURSE PRACTITIONER

## 2020-07-06 NOTE — PATIENT INSTRUCTIONS

## 2020-07-06 NOTE — PROGRESS NOTES
Subjective:      Patient is a 24 y.o. female who presents to the ED 07/06/2020 with a chief complaint of sore throat, headaches, and cough for 3 days. She denies neck pain or stiffness. She denies fever. She denies pregnancy or any PMH.           Review of Systems   Constitutional: Negative for activity change, appetite change, fatigue and fever.   HENT: Positive for sore throat. Negative for congestion and rhinorrhea.    Respiratory: Positive for cough. Negative for chest tightness, shortness of breath and wheezing.    Cardiovascular: Negative for chest pain and palpitations.   Gastrointestinal: Negative for diarrhea, nausea and vomiting.   Musculoskeletal: Negative for arthralgias and myalgias.   Skin: Negative for rash.   Neurological: Positive for headaches. Negative for weakness, light-headedness and numbness.       Objective:      Physical Exam  Vitals signs and nursing note reviewed.   Constitutional:       General: She is not in acute distress.     Appearance: She is well-developed. She is not diaphoretic.   HENT:      Head: Normocephalic and atraumatic.      Nose: Nose normal.   Eyes:      Conjunctiva/sclera: Conjunctivae normal.   Neck:      Musculoskeletal: Normal range of motion.   Cardiovascular:      Rate and Rhythm: Normal rate and regular rhythm.      Heart sounds: Normal heart sounds. No murmur.   Pulmonary:      Effort: No respiratory distress.      Breath sounds: Normal breath sounds. No wheezing.   Musculoskeletal: Normal range of motion.   Skin:     General: Skin is warm and dry.   Neurological:      Mental Status: She is alert and oriented to person, place, and time.         Assessment:       1. Viral pharyngitis  Plan:       1. Viral pharyngitis    The patient's symptoms are consistent with viral pharyngitis.  I do not think the patient has strep pharyngitis based upon the [facility] Criteria but rapid strep test pending; will only treat if positive; pt agreeable to this plan of care. COVID- 19  test pending.   The patient doesn't appear to have any stridor, drooling, hoarseness, uvular deviation, facial swelling, meningismus to suggest peritonsillar abscess, retropharyngeal abscess, epiglotitis, meningitis, airway compromise.  Will treat with supportive care.    - COVID-19 Routine Screening    2. Discharge home and await results.   3. Return to clinic or ED for new or worsening symptoms.   4. Follow-up with PCP as needed.     Scribe Attestation:

## 2020-07-06 NOTE — PROGRESS NOTES
1900 - Mitali Paz NP attempted to contact patient regarding strept swab.  No answer and voicemail box is full.  Pt strep swab would need to be recollected s/t lab processing error.  Pt would need to be seen if sore throat worsens or does not resolve.  Unable to contact patient.

## 2020-07-07 LAB — SARS-COV-2 RNA RESP QL NAA+PROBE: NOT DETECTED

## 2020-07-31 ENCOUNTER — HOSPITAL ENCOUNTER (EMERGENCY)
Facility: HOSPITAL | Age: 25
Discharge: LEFT BEFORE DISCHARGE | End: 2020-07-31
Attending: EMERGENCY MEDICINE

## 2020-07-31 VITALS
TEMPERATURE: 99 F | WEIGHT: 150 LBS | OXYGEN SATURATION: 100 % | BODY MASS INDEX: 24.11 KG/M2 | DIASTOLIC BLOOD PRESSURE: 83 MMHG | SYSTOLIC BLOOD PRESSURE: 123 MMHG | HEART RATE: 109 BPM | HEIGHT: 66 IN | RESPIRATION RATE: 20 BRPM
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
## 2020-07-31 DIAGNOSIS — R52 PAIN: ICD-10-CM

## 2020-07-31 DIAGNOSIS — R55 SYNCOPE: ICD-10-CM

## 2020-07-31 LAB
B-HCG UR QL: NEGATIVE
CTP QC/QA: YES

## 2020-07-31 PROCEDURE — 81025 URINE PREGNANCY TEST: CPT | Performed by: EMERGENCY MEDICINE

## 2020-07-31 PROCEDURE — 99283 EMERGENCY DEPT VISIT LOW MDM: CPT

## 2020-07-31 RX ORDER — MORPHINE SULFATE 4 MG/ML
4 INJECTION, SOLUTION INTRAMUSCULAR; INTRAVENOUS
Status: DISCONTINUED | OUTPATIENT
Start: 2020-07-31 | End: 2020-07-31 | Stop reason: HOSPADM
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
## 2020-09-30 ENCOUNTER — OFFICE VISIT (OUTPATIENT)
Dept: OBSTETRICS AND GYNECOLOGY | Facility: CLINIC | Age: 25
End: 2020-09-30
Payer: MEDICAID

## 2020-09-30 VITALS
BODY MASS INDEX: 26.14 KG/M2 | WEIGHT: 166.56 LBS | HEIGHT: 67 IN | SYSTOLIC BLOOD PRESSURE: 112 MMHG | DIASTOLIC BLOOD PRESSURE: 80 MMHG

## 2020-09-30 DIAGNOSIS — Z01.419 ENCOUNTER FOR WELL WOMAN EXAM WITH ROUTINE GYNECOLOGICAL EXAM: Primary | ICD-10-CM

## 2020-09-30 DIAGNOSIS — Z30.011 ENCOUNTER FOR INITIAL PRESCRIPTION OF CONTRACEPTIVE PILLS: ICD-10-CM

## 2020-09-30 DIAGNOSIS — N94.6 DYSMENORRHEA: ICD-10-CM

## 2020-09-30 PROCEDURE — 99999 PR PBB SHADOW E&M-EST. PATIENT-LVL III: ICD-10-PCS | Mod: PBBFAC,,, | Performed by: NURSE PRACTITIONER

## 2020-09-30 PROCEDURE — 99213 OFFICE O/P EST LOW 20 MIN: CPT | Mod: PBBFAC,PN | Performed by: NURSE PRACTITIONER

## 2020-09-30 PROCEDURE — 99385 PREV VISIT NEW AGE 18-39: CPT | Mod: S$PBB,,, | Performed by: NURSE PRACTITIONER

## 2020-09-30 PROCEDURE — 99999 PR PBB SHADOW E&M-EST. PATIENT-LVL III: CPT | Mod: PBBFAC,,, | Performed by: NURSE PRACTITIONER

## 2020-09-30 PROCEDURE — 99385 PR PREVENTIVE VISIT,NEW,18-39: ICD-10-PCS | Mod: S$PBB,,, | Performed by: NURSE PRACTITIONER

## 2020-09-30 RX ORDER — LEVONORGESTREL AND ETHINYL ESTRADIOL 0.1-0.02MG
1 KIT ORAL DAILY
Qty: 30 TABLET | Refills: 11 | OUTPATIENT
Start: 2020-09-30 | End: 2021-04-18

## 2020-09-30 RX ORDER — IBUPROFEN 800 MG/1
800 TABLET ORAL EVERY 8 HOURS PRN
Qty: 60 TABLET | Refills: 2 | OUTPATIENT
Start: 2020-09-30 | End: 2021-04-18

## 2020-09-30 NOTE — PROGRESS NOTES
Chief Complaint: Well Woman Exam     HPI:      Allison Shaikh is a 24 y.o.  who presents today for well woman exam.  LMP: Patient's last menstrual period was 2020 (exact date).   Patient is currently sexually active with a single male partner. She is currently using no method for contraception. She declines STD screening today. Ms. Shaikh confirms that she is safe at home.  Ms. Shaikh denies abnormal vaginal discharge, pelvic pain, urinary problems, or changes in appetite.  Reports menses has become irregular, heavier, and more painful x 2 months. Reports having to wear 2 pads and a tampon on heaviest days.  Patient in inquiring why she has not gotten pregnant yet. However she has never actively tried to conceive.   Denies history of migraines with vision changes, HTN, VTE, smoking.    Previous Pap:  ASCUS with POSITIVE high risk HPV (2019)   Mammogram, Dexa, Colonoscopy: not indicated    Gardasil:Unsure. Will find out    Family History   Problem Relation Age of Onset    Ovarian cancer Paternal Grandmother     Breast cancer Neg Hx     Cancer Neg Hx     Colon cancer Neg Hx     Diabetes Neg Hx     Eclampsia Neg Hx     Hypertension Neg Hx     Miscarriages / Stillbirths Neg Hx      labor Neg Hx     Stroke Neg Hx      OB History        0    Para   0    Term   0       0    AB   0    Living   0       SAB   0    TAB   0    Ectopic   0    Multiple   0    Live Births   0                 ROS:     GENERAL: Denies unintentional weight gain or weight loss. Feeling well overall.   SKIN: Denies rash or lesions.   HEENT: Denies headaches, or vision changes.   CARDIOVASCULAR: Denies palpitations or chest pain.   RESPIRATORY: Denies shortness of breath or dyspnea on exertion.  BREASTS: Denies pain, lumps, or nipple discharge.   ABDOMEN: Denies abdominal pain, constipation, diarrhea, nausea, vomiting, change in appetite.  URINARY: Denies frequency, dysuria, hematuria.  NEUROLOGIC: Denies  "syncope or weakness.   PSYCHIATRIC: Denies depression, anxiety or mood swings.    Physical Exam:      PHYSICAL EXAM:  /80 (BP Location: Right arm, Patient Position: Sitting)   Ht 5' 7" (1.702 m)   Wt 75.5 kg (166 lb 8.9 oz)   LMP 09/19/2020 (Exact Date)   BMI 26.09 kg/m²   Body mass index is 26.09 kg/m².     APPEARANCE: Well nourished, well developed, in no acute distress.  PSYCH: Appropriate mood and affect.  SKIN: No acne or hirsutism  NECK: Neck symmetric without masses or thyromegaly  NODES: No inguinal, axillary, or supraclavicular lymph node enlargement  ABDOMEN: Soft.  No tenderness or masses.    CARDIOVASCULAR: No edema of peripheral extremities  BREASTS: Symmetrical, no skin changes or visible lesions.  No palpable masses or nipple discharge bilaterally.  PELVIC: Normal external genitalia without lesions.  Normal hair distribution.  Adequate perineal body, normal urethral meatus.  Vagina moist and well rugated without lesions or discharge.  Cervix pink, without lesions, discharge or tenderness.  No significant cystocele or rectocele.  Bimanual exam shows uterus to be normal size, regular, mobile. + tender.  Adnexa without masses or tenderness.      Assessment/Plan:     Encounter for well woman exam with routine gynecological exam  -     Liquid-Based Pap Smear, Screening    Encounter for initial prescription of contraceptive pills  -     POCT urine pregnancy  -     levonorgestrel-ethinyl estradiol (AVIANE,ALESSE,LESSINA) 0.1-20 mg-mcg per tablet; Take 1 tablet by mouth once daily.  Dispense: 30 tablet; Refill: 11    Dysmenorrhea  -     ibuprofen (ADVIL,MOTRIN) 800 MG tablet; Take 1 tablet (800 mg total) by mouth every 8 (eight) hours as needed for Pain.  Dispense: 60 tablet; Refill: 2        Counseling:     Patient was counseled today on current ASCCP pap guidelines, the recommendation for yearly pelvic exams, healthy diet and exercise routines, breast self awareness.She is to see her PCP for other " health maintenance.     The use of hormonal contraception has been fully discussed with the patient. We discussed all options including OCPs, transdermal patches, vaginal ring, Depo Provera injections, Implanon, and IUD. Warnings about anticipated minor side effects such as breakthrough spotting, nausea, breast tenderness, weight changes, acne, headaches, etc were given. She has been told of the more serious potential side effects such as MI, stroke, and deep vein thrombosis, all of which are very unlikely. She has been asked to report any signs of such serious problems immediately. The need for additional protection, such as a condom, to prevent exposure to sexually transmitted diseases has also been discussed- the patient has been clearly reminded that no hormonal contraceptive method can protect her against diseases such as HIV and others. She understands and wishes to take the medication as prescribed. She wishes to begin oral contraceptives (estrogen/progesterone).    Discussed PCOS extensively as patient was previously told she may have PCOS.

## 2020-10-06 LAB
FINAL PATHOLOGIC DIAGNOSIS: NORMAL
Lab: NORMAL

## 2020-11-28 ENCOUNTER — HOSPITAL ENCOUNTER (EMERGENCY)
Facility: HOSPITAL | Age: 25
Discharge: HOME | End: 2020-11-28
Attending: EMERGENCY MEDICINE
Payer: MEDICAID

## 2020-11-28 VITALS
SYSTOLIC BLOOD PRESSURE: 129 MMHG | OXYGEN SATURATION: 97 % | DIASTOLIC BLOOD PRESSURE: 85 MMHG | HEART RATE: 103 BPM | HEIGHT: 68 IN | BODY MASS INDEX: 24.25 KG/M2 | RESPIRATION RATE: 20 BRPM | TEMPERATURE: 99 F | WEIGHT: 160 LBS

## 2020-11-28 DIAGNOSIS — J06.9 VIRAL URI WITH COUGH: ICD-10-CM

## 2020-11-28 DIAGNOSIS — Z20.822 ENCOUNTER FOR LABORATORY TESTING FOR COVID-19 VIRUS: ICD-10-CM

## 2020-11-28 DIAGNOSIS — J02.9 PHARYNGITIS, UNSPECIFIED ETIOLOGY: Primary | ICD-10-CM

## 2020-11-28 LAB — GROUP A STREP, MOLECULAR: NEGATIVE

## 2020-11-28 PROCEDURE — 99284 EMERGENCY DEPT VISIT MOD MDM: CPT

## 2020-11-28 PROCEDURE — U0003 INFECTIOUS AGENT DETECTION BY NUCLEIC ACID (DNA OR RNA); SEVERE ACUTE RESPIRATORY SYNDROME CORONAVIRUS 2 (SARS-COV-2) (CORONAVIRUS DISEASE [COVID-19]), AMPLIFIED PROBE TECHNIQUE, MAKING USE OF HIGH THROUGHPUT TECHNOLOGIES AS DESCRIBED BY CMS-2020-01-R: HCPCS

## 2020-11-28 PROCEDURE — 87651 STREP A DNA AMP PROBE: CPT

## 2020-11-28 RX ORDER — GUAIFENESIN/DEXTROMETHORPHAN 100-10MG/5
5 SYRUP ORAL 4 TIMES DAILY PRN
Qty: 120 ML | Refills: 0 | Status: SHIPPED | OUTPATIENT
Start: 2020-11-28 | End: 2020-12-08

## 2020-11-28 RX ORDER — FLUTICASONE PROPIONATE 50 MCG
1 SPRAY, SUSPENSION (ML) NASAL 2 TIMES DAILY PRN
Qty: 15 G | Refills: 0 | Status: ON HOLD | OUTPATIENT
Start: 2020-11-28 | End: 2022-01-13 | Stop reason: HOSPADM

## 2020-11-28 NOTE — DISCHARGE INSTRUCTIONS
Take medications as prescribed.  Quarantine at home until you get your COVID results.  Take tylenol or motrin as needed.  Follow up with your primary care provider.  For worsening symptoms, chest pain, shortness of breath, increased abdominal pain, high grade fever, stroke or stroke like symptoms, immediately go to the nearest Emergency Room or call 911 as soon as possible.

## 2020-11-28 NOTE — ED PROVIDER NOTES
Encounter Date: 2020       History     Chief Complaint   Patient presents with    Cough     x 1 week     Sore Throat    Headache     Patient is a 25-year-old female who presents with sore throat and headache for 1 week.  She reports associated nasal congestion.  Cough that started today which is nonproductive.  She has been taking NyQuil as needed.  She denies any nausea, vomiting or diarrhea.  She denies shortness of breath.  She denies recent sick contacts.        The history is provided by the patient.     Review of patient's allergies indicates:  No Known Allergies  Past Medical History:   Diagnosis Date    Anxiety     Asthma     Eczema      No past surgical history on file.  Family History   Problem Relation Age of Onset    Ovarian cancer Paternal Grandmother     Breast cancer Neg Hx     Cancer Neg Hx     Colon cancer Neg Hx     Diabetes Neg Hx     Eclampsia Neg Hx     Hypertension Neg Hx     Miscarriages / Stillbirths Neg Hx      labor Neg Hx     Stroke Neg Hx      Social History     Tobacco Use    Smoking status: Never Smoker    Smokeless tobacco: Never Used   Substance Use Topics    Alcohol use: No     Frequency: Never    Drug use: No     Review of Systems   Constitutional: Negative for activity change, appetite change, fatigue and fever.   HENT: Positive for congestion and sore throat. Negative for rhinorrhea.    Respiratory: Positive for cough. Negative for chest tightness, shortness of breath and wheezing.    Cardiovascular: Negative for chest pain and palpitations.   Gastrointestinal: Negative for diarrhea, nausea and vomiting.   Musculoskeletal: Negative for arthralgias and myalgias.   Skin: Negative for rash.   Neurological: Positive for headaches. Negative for weakness, light-headedness and numbness.       Physical Exam     Initial Vitals [20 1338]   BP Pulse Resp Temp SpO2   129/85 103 20 99.1 °F (37.3 °C) 97 %      MAP       --         Physical Exam    Nursing  note and vitals reviewed.  Constitutional: She appears well-developed and well-nourished. She is cooperative.  Non-toxic appearance. She does not have a sickly appearance.   HENT:   Head: Normocephalic and atraumatic.   Right Ear: Tympanic membrane and external ear normal.   Left Ear: Tympanic membrane and external ear normal.   Nose: Nose normal.   Mouth/Throat: Uvula is midline. Posterior oropharyngeal erythema present.   Eyes: Conjunctivae and lids are normal.   Neck: Normal range of motion and full passive range of motion without pain. Neck supple.   Cardiovascular: Normal rate, regular rhythm and normal heart sounds. Exam reveals no gallop and no friction rub.    No murmur heard.  Pulmonary/Chest: Breath sounds normal. She has no wheezes. She has no rhonchi. She has no rales.   Abdominal: Normal appearance.   Neurological: She is alert.   Skin: Skin is warm, dry and intact. No rash noted.         ED Course   Procedures  Labs Reviewed   GROUP A STREP, MOLECULAR   SARS-COV-2 (COVID-19) QUALITATIVE PCR   POCT URINE PREGNANCY          Imaging Results    None          Medical Decision Making:   History:   I obtained history from: someone other than patient.  Old Medical Records: I decided to obtain old medical records.  Clinical Tests:   Lab Tests: Ordered and Reviewed       APC / Resident Notes:   Urgent evaluation of a 25 year old female with complaint of congestion, rhinorrhea, cough and sore throat. Patient denied fever.  No abdominal pain, nausea or vomiting.  Vital signs are stable.  Patient is afebrile.  Abdomen is soft and nontender.  There is no rebound, rigidity or distention.  I doubt intra-abdominal process.  Bilateral TMs with no erythema, retraction or perforation.  There is no mastoid tenderness.  There is no movement tenderness to bilateral ears.  No tonsillar swelling or exudate noted.  Uvula is midline.  Mild posterior oropharyngeal erythema.  COVID Is pending. Strep is negative. Discussed results  with patient. Return precautions given. Patient is to follow up with their primary care provider. All questions answered.                           Clinical Impression:     ICD-10-CM ICD-9-CM   1. Pharyngitis, unspecified etiology  J02.9 462   2. Viral URI with cough  J06.9 465.9   3. Encounter for laboratory testing for COVID-19 virus  Z20.828 V01.79                          ED Disposition Condition    Discharge Stable        ED Prescriptions     Medication Sig Dispense Start Date End Date Auth. Provider    dextromethorphan-guaifenesin  mg/5 ml (ROBITUSSIN-DM)  mg/5 mL liquid Take 5 mLs by mouth 4 (four) times daily as needed (cough). 120 mL 11/28/2020 12/8/2020 Jayla Johansen PA-C    fluticasone propionate (FLONASE) 50 mcg/actuation nasal spray 1 spray (50 mcg total) by Each Nostril route 2 (two) times daily as needed for Rhinitis or Allergies. 15 g 11/28/2020  Jayla Johansen PA-C        Follow-up Information     Follow up With Specialties Details Why Contact Info    Fabby Pace NP Internal Medicine   [address] [facility] [address]  [phone]      [facility]  As needed [address]  [facility] [address] [phone]                                       Jayla Johansen PA-C  11/28/20 5510

## 2020-11-28 NOTE — Clinical Note
"Allison"Dahiana Shaikh was seen and treated in our emergency department on 11/28/2020.     COVID-19 is present in our communities across the state. There is limited testing for COVID at this time, so not all patients can be tested. In this situation, your employee meets the following criteria:    Allison Shaikh has met the criteria for COVID-19 testing and has a NEGATIVE result. The employee can return to work once they are asymptomatic for 72 hours without the use of fever reducing medications (Tylenol, Motrin, etc).     If you have any questions or concerns, or if I can be of further assistance, please do not hesitate to contact me.    Sincerely,             Consuelo Batista RN"

## 2020-11-29 LAB — SARS-COV-2 RNA RESP QL NAA+PROBE: NOT DETECTED

## 2021-04-18 ENCOUNTER — HOSPITAL ENCOUNTER (EMERGENCY)
Facility: HOSPITAL | Age: 26
Discharge: HOME | End: 2021-04-18
Attending: EMERGENCY MEDICINE
Payer: MEDICAID

## 2021-04-18 VITALS
DIASTOLIC BLOOD PRESSURE: 81 MMHG | OXYGEN SATURATION: 100 % | SYSTOLIC BLOOD PRESSURE: 127 MMHG | HEIGHT: 67 IN | WEIGHT: 186 LBS | RESPIRATION RATE: 18 BRPM | HEART RATE: 98 BPM | BODY MASS INDEX: 29.19 KG/M2 | TEMPERATURE: 98 F

## 2021-04-18 DIAGNOSIS — O20.0 THREATENED MISCARRIAGE IN EARLY PREGNANCY: Primary | ICD-10-CM

## 2021-04-18 DIAGNOSIS — R51.9 ACUTE NONINTRACTABLE HEADACHE, UNSPECIFIED HEADACHE TYPE: ICD-10-CM

## 2021-04-18 LAB
B-HCG UR QL: POSITIVE
CTP QC/QA: YES
HCG INTACT+B SERPL-ACNC: 34 MIU/ML
RH BLD: NORMAL

## 2021-04-18 PROCEDURE — 84702 CHORIONIC GONADOTROPIN TEST: CPT | Performed by: EMERGENCY MEDICINE

## 2021-04-18 PROCEDURE — 86901 BLOOD TYPING SEROLOGIC RH(D): CPT | Performed by: EMERGENCY MEDICINE

## 2021-04-18 PROCEDURE — 99284 EMERGENCY DEPT VISIT MOD MDM: CPT | Mod: 25

## 2021-04-18 PROCEDURE — 36415 COLL VENOUS BLD VENIPUNCTURE: CPT | Performed by: EMERGENCY MEDICINE

## 2021-04-18 PROCEDURE — 25000003 PHARM REV CODE 250: Performed by: EMERGENCY MEDICINE

## 2021-04-18 PROCEDURE — 81025 URINE PREGNANCY TEST: CPT | Performed by: EMERGENCY MEDICINE

## 2021-04-18 RX ORDER — ACETAMINOPHEN 500 MG
1000 TABLET ORAL
Status: COMPLETED | OUTPATIENT
Start: 2021-04-18 | End: 2021-04-18

## 2021-04-18 RX ADMIN — ACETAMINOPHEN 1000 MG: 500 TABLET ORAL at 03:04

## 2021-04-21 ENCOUNTER — PATIENT OUTREACH (OUTPATIENT)
Dept: EMERGENCY MEDICINE | Facility: HOSPITAL | Age: 26
End: 2021-04-21

## 2021-04-27 ENCOUNTER — TELEPHONE (OUTPATIENT)
Dept: OBSTETRICS AND GYNECOLOGY | Facility: CLINIC | Age: 26
End: 2021-04-27

## 2021-05-04 ENCOUNTER — PATIENT MESSAGE (OUTPATIENT)
Dept: RESEARCH | Facility: HOSPITAL | Age: 26
End: 2021-05-04

## 2021-05-09 ENCOUNTER — HOSPITAL ENCOUNTER (EMERGENCY)
Facility: HOSPITAL | Age: 26
Discharge: HOME | End: 2021-05-10
Attending: EMERGENCY MEDICINE
Payer: MEDICAID

## 2021-05-09 VITALS
TEMPERATURE: 98 F | OXYGEN SATURATION: 100 % | RESPIRATION RATE: 20 BRPM | DIASTOLIC BLOOD PRESSURE: 70 MMHG | SYSTOLIC BLOOD PRESSURE: 144 MMHG | HEART RATE: 98 BPM | WEIGHT: 180 LBS | BODY MASS INDEX: 28.19 KG/M2

## 2021-05-09 DIAGNOSIS — O20.0 THREATENED MISCARRIAGE: ICD-10-CM

## 2021-05-09 DIAGNOSIS — O20.9 VAGINAL BLEEDING AFFECTING EARLY PREGNANCY: ICD-10-CM

## 2021-05-09 DIAGNOSIS — R51.9 NONINTRACTABLE HEADACHE, UNSPECIFIED CHRONICITY PATTERN, UNSPECIFIED HEADACHE TYPE: Primary | ICD-10-CM

## 2021-05-09 LAB
B-HCG UR QL: POSITIVE
CTP QC/QA: YES

## 2021-05-09 PROCEDURE — 99284 EMERGENCY DEPT VISIT MOD MDM: CPT | Mod: 25

## 2021-05-09 PROCEDURE — 86900 BLOOD TYPING SEROLOGIC ABO: CPT | Performed by: EMERGENCY MEDICINE

## 2021-05-09 PROCEDURE — 96360 HYDRATION IV INFUSION INIT: CPT

## 2021-05-09 PROCEDURE — 81025 URINE PREGNANCY TEST: CPT | Performed by: EMERGENCY MEDICINE

## 2021-05-09 PROCEDURE — 85025 COMPLETE CBC W/AUTO DIFF WBC: CPT | Performed by: EMERGENCY MEDICINE

## 2021-05-09 PROCEDURE — 84702 CHORIONIC GONADOTROPIN TEST: CPT | Performed by: EMERGENCY MEDICINE

## 2021-05-09 PROCEDURE — 80053 COMPREHEN METABOLIC PANEL: CPT | Performed by: EMERGENCY MEDICINE

## 2021-05-10 LAB
ABO + RH BLD: NORMAL
ALBUMIN SERPL BCP-MCNC: 3.9 G/DL (ref 3.5–5.2)
ALP SERPL-CCNC: 82 U/L (ref 55–135)
ALT SERPL W/O P-5'-P-CCNC: 27 U/L (ref 10–44)
ANION GAP SERPL CALC-SCNC: 12 MMOL/L (ref 8–16)
AST SERPL-CCNC: 29 U/L (ref 10–40)
BASOPHILS # BLD AUTO: 0.06 K/UL (ref 0–0.2)
BASOPHILS NFR BLD: 0.8 % (ref 0–1.9)
BILIRUB SERPL-MCNC: 0.2 MG/DL (ref 0.1–1)
BUN SERPL-MCNC: 11 MG/DL (ref 6–20)
CALCIUM SERPL-MCNC: 8.9 MG/DL (ref 8.7–10.5)
CHLORIDE SERPL-SCNC: 105 MMOL/L (ref 95–110)
CO2 SERPL-SCNC: 21 MMOL/L (ref 23–29)
CREAT SERPL-MCNC: 0.8 MG/DL (ref 0.5–1.4)
DIFFERENTIAL METHOD: ABNORMAL
EOSINOPHIL # BLD AUTO: 0.2 K/UL (ref 0–0.5)
EOSINOPHIL NFR BLD: 2 % (ref 0–8)
ERYTHROCYTE [DISTWIDTH] IN BLOOD BY AUTOMATED COUNT: 16.7 % (ref 11.5–14.5)
EST. GFR  (AFRICAN AMERICAN): >60 ML/MIN/1.73 M^2
EST. GFR  (NON AFRICAN AMERICAN): >60 ML/MIN/1.73 M^2
GLUCOSE SERPL-MCNC: 100 MG/DL (ref 70–110)
HCG INTACT+B SERPL-ACNC: 26 MIU/ML
HCT VFR BLD AUTO: 26.5 % (ref 37–48.5)
HGB BLD-MCNC: 7.9 G/DL (ref 12–16)
IMM GRANULOCYTES # BLD AUTO: 0.01 K/UL (ref 0–0.04)
IMM GRANULOCYTES NFR BLD AUTO: 0.1 % (ref 0–0.5)
LYMPHOCYTES # BLD AUTO: 2.6 K/UL (ref 1–4.8)
LYMPHOCYTES NFR BLD: 33 % (ref 18–48)
MCH RBC QN AUTO: 19.3 PG (ref 27–31)
MCHC RBC AUTO-ENTMCNC: 29.8 G/DL (ref 32–36)
MCV RBC AUTO: 65 FL (ref 82–98)
MONOCYTES # BLD AUTO: 0.9 K/UL (ref 0.3–1)
MONOCYTES NFR BLD: 11.3 % (ref 4–15)
NEUTROPHILS # BLD AUTO: 4.2 K/UL (ref 1.8–7.7)
NEUTROPHILS NFR BLD: 52.8 % (ref 38–73)
NRBC BLD-RTO: 0 /100 WBC
PLATELET # BLD AUTO: 384 K/UL (ref 150–450)
PMV BLD AUTO: 10.7 FL (ref 9.2–12.9)
POTASSIUM SERPL-SCNC: 3.5 MMOL/L (ref 3.5–5.1)
PROT SERPL-MCNC: 7.5 G/DL (ref 6–8.4)
RBC # BLD AUTO: 4.09 M/UL (ref 4–5.4)
SODIUM SERPL-SCNC: 138 MMOL/L (ref 136–145)
WBC # BLD AUTO: 7.89 K/UL (ref 3.9–12.7)

## 2021-05-10 PROCEDURE — 25000003 PHARM REV CODE 250: Performed by: EMERGENCY MEDICINE

## 2021-05-10 RX ORDER — ACETAMINOPHEN 500 MG
1000 TABLET ORAL
Status: COMPLETED | OUTPATIENT
Start: 2021-05-10 | End: 2021-05-10

## 2021-05-10 RX ADMIN — ACETAMINOPHEN 1000 MG: 500 TABLET ORAL at 12:05

## 2021-05-10 RX ADMIN — SODIUM CHLORIDE 1000 ML: 0.9 INJECTION, SOLUTION INTRAVENOUS at 12:05

## 2021-05-21 ENCOUNTER — PATIENT OUTREACH (OUTPATIENT)
Dept: ADMINISTRATIVE | Facility: HOSPITAL | Age: 26
End: 2021-05-21

## 2021-07-20 ENCOUNTER — HOSPITAL ENCOUNTER (EMERGENCY)
Facility: HOSPITAL | Age: 26
Discharge: HOME | End: 2021-07-20
Attending: EMERGENCY MEDICINE
Payer: MEDICAID

## 2021-07-20 VITALS
OXYGEN SATURATION: 100 % | HEIGHT: 68 IN | HEART RATE: 96 BPM | TEMPERATURE: 98 F | WEIGHT: 180 LBS | RESPIRATION RATE: 16 BRPM | DIASTOLIC BLOOD PRESSURE: 74 MMHG | BODY MASS INDEX: 27.28 KG/M2 | SYSTOLIC BLOOD PRESSURE: 135 MMHG

## 2021-07-20 VITALS
WEIGHT: 180 LBS | BODY MASS INDEX: 27.37 KG/M2 | RESPIRATION RATE: 17 BRPM | DIASTOLIC BLOOD PRESSURE: 63 MMHG | SYSTOLIC BLOOD PRESSURE: 119 MMHG | TEMPERATURE: 99 F | HEART RATE: 86 BPM | OXYGEN SATURATION: 100 %

## 2021-07-20 DIAGNOSIS — R51.9 NONINTRACTABLE HEADACHE, UNSPECIFIED CHRONICITY PATTERN, UNSPECIFIED HEADACHE TYPE: Primary | ICD-10-CM

## 2021-07-20 DIAGNOSIS — O20.0 THREATENED MISCARRIAGE IN EARLY PREGNANCY: Primary | ICD-10-CM

## 2021-07-20 DIAGNOSIS — R10.30 LOWER ABDOMINAL PAIN: ICD-10-CM

## 2021-07-20 LAB
ALBUMIN SERPL BCP-MCNC: 4 G/DL (ref 3.5–5.2)
ALP SERPL-CCNC: 69 U/L (ref 55–135)
ALT SERPL W/O P-5'-P-CCNC: 16 U/L (ref 10–44)
ANION GAP SERPL CALC-SCNC: 10 MMOL/L (ref 8–16)
AST SERPL-CCNC: 30 U/L (ref 10–40)
B-HCG UR QL: NEGATIVE
BASOPHILS # BLD AUTO: 0.04 K/UL (ref 0–0.2)
BASOPHILS NFR BLD: 0.5 % (ref 0–1.9)
BILIRUB SERPL-MCNC: 0.6 MG/DL (ref 0.1–1)
BILIRUB UR QL STRIP: NEGATIVE
BUN SERPL-MCNC: 13 MG/DL (ref 6–20)
CALCIUM SERPL-MCNC: 9.1 MG/DL (ref 8.7–10.5)
CHLORIDE SERPL-SCNC: 104 MMOL/L (ref 95–110)
CLARITY UR: CLEAR
CO2 SERPL-SCNC: 21 MMOL/L (ref 23–29)
COLOR UR: YELLOW
CREAT SERPL-MCNC: 0.7 MG/DL (ref 0.5–1.4)
CTP QC/QA: YES
DIFFERENTIAL METHOD: ABNORMAL
EOSINOPHIL # BLD AUTO: 0.2 K/UL (ref 0–0.5)
EOSINOPHIL NFR BLD: 2.8 % (ref 0–8)
ERYTHROCYTE [DISTWIDTH] IN BLOOD BY AUTOMATED COUNT: 19.1 % (ref 11.5–14.5)
EST. GFR  (AFRICAN AMERICAN): >60 ML/MIN/1.73 M^2
EST. GFR  (NON AFRICAN AMERICAN): >60 ML/MIN/1.73 M^2
GLUCOSE SERPL-MCNC: 144 MG/DL (ref 70–110)
GLUCOSE UR QL STRIP: NEGATIVE
HCG INTACT+B SERPL-ACNC: 77 MIU/ML
HCT VFR BLD AUTO: 31.7 % (ref 37–48.5)
HGB BLD-MCNC: 9.2 G/DL (ref 12–16)
HGB UR QL STRIP: NEGATIVE
IMM GRANULOCYTES # BLD AUTO: 0.01 K/UL (ref 0–0.04)
IMM GRANULOCYTES NFR BLD AUTO: 0.1 % (ref 0–0.5)
KETONES UR QL STRIP: NEGATIVE
LEUKOCYTE ESTERASE UR QL STRIP: NEGATIVE
LYMPHOCYTES # BLD AUTO: 2.1 K/UL (ref 1–4.8)
LYMPHOCYTES NFR BLD: 26.7 % (ref 18–48)
MCH RBC QN AUTO: 18 PG (ref 27–31)
MCHC RBC AUTO-ENTMCNC: 29 G/DL (ref 32–36)
MCV RBC AUTO: 62 FL (ref 82–98)
MONOCYTES # BLD AUTO: 0.7 K/UL (ref 0.3–1)
MONOCYTES NFR BLD: 9 % (ref 4–15)
NEUTROPHILS # BLD AUTO: 4.9 K/UL (ref 1.8–7.7)
NEUTROPHILS NFR BLD: 60.9 % (ref 38–73)
NITRITE UR QL STRIP: NEGATIVE
NRBC BLD-RTO: 0 /100 WBC
PH UR STRIP: >8 [PH] (ref 5–8)
PLATELET # BLD AUTO: 309 K/UL (ref 150–450)
PMV BLD AUTO: 10.5 FL (ref 9.2–12.9)
POTASSIUM SERPL-SCNC: 4.4 MMOL/L (ref 3.5–5.1)
PROT SERPL-MCNC: 8.2 G/DL (ref 6–8.4)
PROT UR QL STRIP: ABNORMAL
RBC # BLD AUTO: 5.11 M/UL (ref 4–5.4)
SODIUM SERPL-SCNC: 135 MMOL/L (ref 136–145)
SP GR UR STRIP: 1.02 (ref 1–1.03)
URN SPEC COLLECT METH UR: ABNORMAL
UROBILINOGEN UR STRIP-ACNC: ABNORMAL EU/DL
WBC # BLD AUTO: 7.97 K/UL (ref 3.9–12.7)

## 2021-07-20 PROCEDURE — 81003 URINALYSIS AUTO W/O SCOPE: CPT | Performed by: EMERGENCY MEDICINE

## 2021-07-20 PROCEDURE — 84702 CHORIONIC GONADOTROPIN TEST: CPT | Performed by: EMERGENCY MEDICINE

## 2021-07-20 PROCEDURE — 80053 COMPREHEN METABOLIC PANEL: CPT | Performed by: EMERGENCY MEDICINE

## 2021-07-20 PROCEDURE — 85025 COMPLETE CBC W/AUTO DIFF WBC: CPT | Performed by: EMERGENCY MEDICINE

## 2021-07-20 PROCEDURE — 99283 EMERGENCY DEPT VISIT LOW MDM: CPT

## 2021-07-20 PROCEDURE — 81025 URINE PREGNANCY TEST: CPT | Performed by: EMERGENCY MEDICINE

## 2021-07-20 PROCEDURE — 36415 COLL VENOUS BLD VENIPUNCTURE: CPT | Performed by: EMERGENCY MEDICINE

## 2021-07-20 PROCEDURE — 99284 EMERGENCY DEPT VISIT MOD MDM: CPT

## 2021-07-20 RX ORDER — BUTALBITAL, ACETAMINOPHEN AND CAFFEINE 50; 325; 40 MG/1; MG/1; MG/1
1 TABLET ORAL EVERY 4 HOURS PRN
Qty: 18 TABLET | Refills: 0 | OUTPATIENT
Start: 2021-07-20 | End: 2021-07-20

## 2021-07-30 ENCOUNTER — HOSPITAL ENCOUNTER (EMERGENCY)
Facility: HOSPITAL | Age: 26
Discharge: HOME | End: 2021-07-30
Attending: EMERGENCY MEDICINE
Payer: MEDICAID

## 2021-07-30 VITALS
TEMPERATURE: 99 F | WEIGHT: 180 LBS | SYSTOLIC BLOOD PRESSURE: 131 MMHG | HEART RATE: 94 BPM | OXYGEN SATURATION: 100 % | BODY MASS INDEX: 27.28 KG/M2 | RESPIRATION RATE: 18 BRPM | HEIGHT: 68 IN | DIASTOLIC BLOOD PRESSURE: 71 MMHG

## 2021-07-30 DIAGNOSIS — Z20.2 EXPOSURE TO CHLAMYDIA: Primary | ICD-10-CM

## 2021-07-30 DIAGNOSIS — A74.9 CHLAMYDIA: ICD-10-CM

## 2021-07-30 PROCEDURE — 99283 EMERGENCY DEPT VISIT LOW MDM: CPT

## 2021-07-30 PROCEDURE — 63700000 PHARM REV CODE 250 ALT 637 W/O HCPCS: Performed by: EMERGENCY MEDICINE

## 2021-07-30 RX ORDER — AZITHROMYCIN 250 MG/1
1000 TABLET, FILM COATED ORAL
Status: COMPLETED | OUTPATIENT
Start: 2021-07-30 | End: 2021-07-30

## 2021-07-30 RX ADMIN — AZITHROMYCIN 1000 MG: 250 TABLET, FILM COATED ORAL at 09:07

## 2021-08-08 ENCOUNTER — HOSPITAL ENCOUNTER (EMERGENCY)
Facility: HOSPITAL | Age: 26
Discharge: HOME | End: 2021-08-08
Attending: EMERGENCY MEDICINE
Payer: MEDICAID

## 2021-08-08 VITALS
DIASTOLIC BLOOD PRESSURE: 83 MMHG | RESPIRATION RATE: 18 BRPM | TEMPERATURE: 99 F | SYSTOLIC BLOOD PRESSURE: 145 MMHG | OXYGEN SATURATION: 99 % | BODY MASS INDEX: 29.03 KG/M2 | HEIGHT: 67 IN | HEART RATE: 90 BPM | WEIGHT: 185 LBS

## 2021-08-08 DIAGNOSIS — O20.0 THREATENED MISCARRIAGE IN EARLY PREGNANCY: Primary | ICD-10-CM

## 2021-08-08 DIAGNOSIS — O46.90 VAGINAL BLEEDING IN PREGNANCY: ICD-10-CM

## 2021-08-08 LAB
ABO + RH BLD: NORMAL
ANION GAP SERPL CALC-SCNC: 10 MMOL/L (ref 8–16)
BASOPHILS # BLD AUTO: 0.06 K/UL (ref 0–0.2)
BASOPHILS NFR BLD: 0.7 % (ref 0–1.9)
BLD GP AB SCN CELLS X3 SERPL QL: NORMAL
BUN SERPL-MCNC: 7 MG/DL (ref 6–20)
CALCIUM SERPL-MCNC: 8.8 MG/DL (ref 8.7–10.5)
CHLORIDE SERPL-SCNC: 103 MMOL/L (ref 95–110)
CO2 SERPL-SCNC: 23 MMOL/L (ref 23–29)
CREAT SERPL-MCNC: 0.7 MG/DL (ref 0.5–1.4)
DIFFERENTIAL METHOD: ABNORMAL
EOSINOPHIL # BLD AUTO: 0.1 K/UL (ref 0–0.5)
EOSINOPHIL NFR BLD: 1.5 % (ref 0–8)
ERYTHROCYTE [DISTWIDTH] IN BLOOD BY AUTOMATED COUNT: 19.1 % (ref 11.5–14.5)
EST. GFR  (AFRICAN AMERICAN): >60 ML/MIN/1.73 M^2
EST. GFR  (NON AFRICAN AMERICAN): >60 ML/MIN/1.73 M^2
GLUCOSE SERPL-MCNC: 98 MG/DL (ref 70–110)
HCG INTACT+B SERPL-ACNC: NORMAL MIU/ML
HCT VFR BLD AUTO: 28.7 % (ref 37–48.5)
HGB BLD-MCNC: 8.2 G/DL (ref 12–16)
IMM GRANULOCYTES # BLD AUTO: 0.02 K/UL (ref 0–0.04)
IMM GRANULOCYTES NFR BLD AUTO: 0.2 % (ref 0–0.5)
LYMPHOCYTES # BLD AUTO: 2.5 K/UL (ref 1–4.8)
LYMPHOCYTES NFR BLD: 29.9 % (ref 18–48)
MCH RBC QN AUTO: 17.9 PG (ref 27–31)
MCHC RBC AUTO-ENTMCNC: 28.6 G/DL (ref 32–36)
MCV RBC AUTO: 63 FL (ref 82–98)
MONOCYTES # BLD AUTO: 1 K/UL (ref 0.3–1)
MONOCYTES NFR BLD: 12 % (ref 4–15)
NEUTROPHILS # BLD AUTO: 4.7 K/UL (ref 1.8–7.7)
NEUTROPHILS NFR BLD: 55.7 % (ref 38–73)
NRBC BLD-RTO: 0 /100 WBC
PLATELET # BLD AUTO: 441 K/UL (ref 150–450)
PMV BLD AUTO: 9.8 FL (ref 9.2–12.9)
POTASSIUM SERPL-SCNC: 4 MMOL/L (ref 3.5–5.1)
RBC # BLD AUTO: 4.59 M/UL (ref 4–5.4)
SODIUM SERPL-SCNC: 136 MMOL/L (ref 136–145)
WBC # BLD AUTO: 8.44 K/UL (ref 3.9–12.7)

## 2021-08-08 PROCEDURE — 84702 CHORIONIC GONADOTROPIN TEST: CPT | Performed by: EMERGENCY MEDICINE

## 2021-08-08 PROCEDURE — 99284 EMERGENCY DEPT VISIT MOD MDM: CPT

## 2021-08-08 PROCEDURE — 86900 BLOOD TYPING SEROLOGIC ABO: CPT | Performed by: EMERGENCY MEDICINE

## 2021-08-08 PROCEDURE — 80048 BASIC METABOLIC PNL TOTAL CA: CPT | Performed by: EMERGENCY MEDICINE

## 2021-08-08 PROCEDURE — 85025 COMPLETE CBC W/AUTO DIFF WBC: CPT | Performed by: EMERGENCY MEDICINE

## 2021-10-17 ENCOUNTER — HOSPITAL ENCOUNTER (EMERGENCY)
Facility: HOSPITAL | Age: 26
Discharge: HOME | End: 2021-10-17
Attending: EMERGENCY MEDICINE
Payer: MEDICAID

## 2021-10-17 VITALS
RESPIRATION RATE: 14 BRPM | OXYGEN SATURATION: 98 % | WEIGHT: 192 LBS | SYSTOLIC BLOOD PRESSURE: 121 MMHG | HEIGHT: 67 IN | DIASTOLIC BLOOD PRESSURE: 85 MMHG | BODY MASS INDEX: 30.13 KG/M2 | TEMPERATURE: 99 F | HEART RATE: 68 BPM

## 2021-10-17 DIAGNOSIS — O20.0 THREATENED MISCARRIAGE: ICD-10-CM

## 2021-10-17 DIAGNOSIS — R51.9 NONINTRACTABLE HEADACHE, UNSPECIFIED CHRONICITY PATTERN, UNSPECIFIED HEADACHE TYPE: ICD-10-CM

## 2021-10-17 DIAGNOSIS — N89.8 VAGINAL DISCHARGE: ICD-10-CM

## 2021-10-17 DIAGNOSIS — R10.9 ABDOMINAL CRAMPING: ICD-10-CM

## 2021-10-17 DIAGNOSIS — Z3A.17 17 WEEKS GESTATION OF PREGNANCY: Primary | ICD-10-CM

## 2021-10-17 LAB
ALBUMIN SERPL BCP-MCNC: 3.3 G/DL (ref 3.5–5.2)
ALP SERPL-CCNC: 69 U/L (ref 55–135)
ALT SERPL W/O P-5'-P-CCNC: 36 U/L (ref 10–44)
ANION GAP SERPL CALC-SCNC: 11 MMOL/L (ref 8–16)
AST SERPL-CCNC: 38 U/L (ref 10–40)
B-HCG UR QL: POSITIVE
BACTERIA #/AREA URNS HPF: NEGATIVE /HPF
BACTERIA GENITAL QL WET PREP: ABNORMAL
BASOPHILS # BLD AUTO: 0.03 K/UL (ref 0–0.2)
BASOPHILS NFR BLD: 0.4 % (ref 0–1.9)
BILIRUB SERPL-MCNC: 0.4 MG/DL (ref 0.1–1)
BILIRUB UR QL STRIP: NEGATIVE
BUN SERPL-MCNC: 5 MG/DL (ref 6–20)
CALCIUM SERPL-MCNC: 8.5 MG/DL (ref 8.7–10.5)
CHLORIDE SERPL-SCNC: 104 MMOL/L (ref 95–110)
CLARITY UR: CLEAR
CO2 SERPL-SCNC: 20 MMOL/L (ref 23–29)
COLOR UR: YELLOW
CREAT SERPL-MCNC: 0.6 MG/DL (ref 0.5–1.4)
CTP QC/QA: YES
DIFFERENTIAL METHOD: ABNORMAL
EOSINOPHIL # BLD AUTO: 0.1 K/UL (ref 0–0.5)
EOSINOPHIL NFR BLD: 1.7 % (ref 0–8)
ERYTHROCYTE [DISTWIDTH] IN BLOOD BY AUTOMATED COUNT: 19.5 % (ref 11.5–14.5)
EST. GFR  (AFRICAN AMERICAN): >60 ML/MIN/1.73 M^2
EST. GFR  (NON AFRICAN AMERICAN): >60 ML/MIN/1.73 M^2
GLUCOSE SERPL-MCNC: 83 MG/DL (ref 70–110)
GLUCOSE UR QL STRIP: ABNORMAL
HCT VFR BLD AUTO: 30.1 % (ref 37–48.5)
HGB BLD-MCNC: 8.6 G/DL (ref 12–16)
HGB UR QL STRIP: NEGATIVE
HYALINE CASTS #/AREA URNS LPF: 4 /LPF
IMM GRANULOCYTES # BLD AUTO: 0.02 K/UL (ref 0–0.04)
IMM GRANULOCYTES NFR BLD AUTO: 0.2 % (ref 0–0.5)
KETONES UR QL STRIP: ABNORMAL
LEUKOCYTE ESTERASE UR QL STRIP: NEGATIVE
LIPASE SERPL-CCNC: 25 U/L (ref 4–60)
LYMPHOCYTES # BLD AUTO: 2.1 K/UL (ref 1–4.8)
LYMPHOCYTES NFR BLD: 25.4 % (ref 18–48)
MCH RBC QN AUTO: 17.9 PG (ref 27–31)
MCHC RBC AUTO-ENTMCNC: 28.6 G/DL (ref 32–36)
MCV RBC AUTO: 63 FL (ref 82–98)
MICROSCOPIC COMMENT: ABNORMAL
MONOCYTES # BLD AUTO: 0.7 K/UL (ref 0.3–1)
MONOCYTES NFR BLD: 8.9 % (ref 4–15)
NEUTROPHILS # BLD AUTO: 5.2 K/UL (ref 1.8–7.7)
NEUTROPHILS NFR BLD: 63.4 % (ref 38–73)
NITRITE UR QL STRIP: NEGATIVE
NRBC BLD-RTO: 0 /100 WBC
PH UR STRIP: 7 [PH] (ref 5–8)
PLATELET # BLD AUTO: 333 K/UL (ref 150–450)
PMV BLD AUTO: 10.5 FL (ref 9.2–12.9)
POTASSIUM SERPL-SCNC: 3.4 MMOL/L (ref 3.5–5.1)
PROT SERPL-MCNC: 7.5 G/DL (ref 6–8.4)
PROT UR QL STRIP: ABNORMAL
RBC # BLD AUTO: 4.81 M/UL (ref 4–5.4)
RBC #/AREA URNS HPF: 1 /HPF (ref 0–4)
SARS-COV-2 RDRP RESP QL NAA+PROBE: NEGATIVE
SODIUM SERPL-SCNC: 135 MMOL/L (ref 136–145)
SP GR UR STRIP: >1.03 (ref 1–1.03)
SPECIMEN SOURCE: ABNORMAL
SQUAMOUS #/AREA URNS HPF: 4 /HPF
T VAGINALIS GENITAL QL WET PREP: ABNORMAL
URN SPEC COLLECT METH UR: ABNORMAL
UROBILINOGEN UR STRIP-ACNC: ABNORMAL EU/DL
WBC # BLD AUTO: 8.12 K/UL (ref 3.9–12.7)
WBC #/AREA URNS HPF: 2 /HPF (ref 0–5)
WBC #/AREA VAG WET PREP: ABNORMAL
YEAST GENITAL QL WET PREP: ABNORMAL
YEAST URNS QL MICRO: ABNORMAL

## 2021-10-17 PROCEDURE — 25000003 PHARM REV CODE 250: Performed by: NURSE PRACTITIONER

## 2021-10-17 PROCEDURE — 83690 ASSAY OF LIPASE: CPT | Performed by: NURSE PRACTITIONER

## 2021-10-17 PROCEDURE — 96361 HYDRATE IV INFUSION ADD-ON: CPT

## 2021-10-17 PROCEDURE — 87591 N.GONORRHOEAE DNA AMP PROB: CPT | Performed by: NURSE PRACTITIONER

## 2021-10-17 PROCEDURE — 80053 COMPREHEN METABOLIC PANEL: CPT | Performed by: NURSE PRACTITIONER

## 2021-10-17 PROCEDURE — 96365 THER/PROPH/DIAG IV INF INIT: CPT

## 2021-10-17 PROCEDURE — 81025 URINE PREGNANCY TEST: CPT | Performed by: NURSE PRACTITIONER

## 2021-10-17 PROCEDURE — 63600175 PHARM REV CODE 636 W HCPCS: Performed by: NURSE PRACTITIONER

## 2021-10-17 PROCEDURE — U0002 COVID-19 LAB TEST NON-CDC: HCPCS | Performed by: NURSE PRACTITIONER

## 2021-10-17 PROCEDURE — 36415 COLL VENOUS BLD VENIPUNCTURE: CPT | Performed by: NURSE PRACTITIONER

## 2021-10-17 PROCEDURE — 99284 EMERGENCY DEPT VISIT MOD MDM: CPT | Mod: 25

## 2021-10-17 PROCEDURE — 87491 CHLMYD TRACH DNA AMP PROBE: CPT | Performed by: NURSE PRACTITIONER

## 2021-10-17 PROCEDURE — 87210 SMEAR WET MOUNT SALINE/INK: CPT | Performed by: NURSE PRACTITIONER

## 2021-10-17 PROCEDURE — 87205 SMEAR GRAM STAIN: CPT | Performed by: NURSE PRACTITIONER

## 2021-10-17 PROCEDURE — 81001 URINALYSIS AUTO W/SCOPE: CPT | Performed by: NURSE PRACTITIONER

## 2021-10-17 PROCEDURE — 87081 CULTURE SCREEN ONLY: CPT | Performed by: NURSE PRACTITIONER

## 2021-10-17 PROCEDURE — 63700000 PHARM REV CODE 250 ALT 637 W/O HCPCS: Performed by: NURSE PRACTITIONER

## 2021-10-17 PROCEDURE — 85025 COMPLETE CBC W/AUTO DIFF WBC: CPT | Performed by: NURSE PRACTITIONER

## 2021-10-17 RX ORDER — METOCLOPRAMIDE 10 MG/1
10 TABLET ORAL
Status: DISCONTINUED | OUTPATIENT
Start: 2021-10-17 | End: 2021-10-18 | Stop reason: HOSPADM

## 2021-10-17 RX ORDER — METOCLOPRAMIDE 10 MG/1
10 TABLET ORAL DAILY PRN
Qty: 30 TABLET | Refills: 0 | Status: ON HOLD | OUTPATIENT
Start: 2021-10-17 | End: 2022-01-13 | Stop reason: HOSPADM

## 2021-10-17 RX ORDER — DIPHENHYDRAMINE HCL 25 MG
25 CAPSULE ORAL
Status: COMPLETED | OUTPATIENT
Start: 2021-10-17 | End: 2021-10-17

## 2021-10-17 RX ORDER — AZITHROMYCIN 250 MG/1
1000 TABLET, FILM COATED ORAL
Status: COMPLETED | OUTPATIENT
Start: 2021-10-17 | End: 2021-10-17

## 2021-10-17 RX ORDER — METOCLOPRAMIDE 10 MG/1
10 TABLET ORAL
Status: COMPLETED | OUTPATIENT
Start: 2021-10-17 | End: 2021-10-17

## 2021-10-17 RX ADMIN — CEFTRIAXONE 1 G: 1 INJECTION, SOLUTION INTRAVENOUS at 10:10

## 2021-10-17 RX ADMIN — AZITHROMYCIN MONOHYDRATE 1000 MG: 250 TABLET ORAL at 10:10

## 2021-10-17 RX ADMIN — SODIUM CHLORIDE 1000 ML: 9 INJECTION, SOLUTION INTRAVENOUS at 07:10

## 2021-10-17 RX ADMIN — DIPHENHYDRAMINE HYDROCHLORIDE 25 MG: 25 CAPSULE ORAL at 09:10

## 2021-10-17 RX ADMIN — METOCLOPRAMIDE 10 MG: 10 TABLET ORAL at 09:10

## 2021-10-20 LAB
CHLAMYDIA, AMPLIFIED DNA: NEGATIVE
N GONORRHOEAE, AMPLIFIED DNA: NEGATIVE

## 2021-10-21 LAB
BACTERIA GENITAL AEROBE CULT: NORMAL
GRAM STN SPEC: NORMAL

## 2022-01-11 ENCOUNTER — HOSPITAL ENCOUNTER (OUTPATIENT)
Facility: HOSPITAL | Age: 27
LOS: 1 days | Discharge: HOME | End: 2022-01-13
Attending: EMERGENCY MEDICINE | Admitting: SPECIALIST
Payer: MEDICAID

## 2022-01-11 DIAGNOSIS — O24.414 INSULIN CONTROLLED GESTATIONAL DIABETES MELLITUS (GDM) IN THIRD TRIMESTER: Primary | ICD-10-CM

## 2022-01-11 DIAGNOSIS — R73.9 HYPERGLYCEMIA: ICD-10-CM

## 2022-01-11 LAB
ALBUMIN SERPL BCP-MCNC: 3.2 G/DL (ref 3.5–5.2)
ALLENS TEST: ABNORMAL
ALP SERPL-CCNC: 142 U/L (ref 55–135)
ALT SERPL W/O P-5'-P-CCNC: 17 U/L (ref 10–44)
ANION GAP SERPL CALC-SCNC: 12 MMOL/L (ref 8–16)
AST SERPL-CCNC: 24 U/L (ref 10–40)
B-OH-BUTYR BLD STRIP-SCNC: 0.2 MMOL/L (ref 0–0.5)
BASOPHILS # BLD AUTO: 0.03 K/UL (ref 0–0.2)
BASOPHILS NFR BLD: 0.5 % (ref 0–1.9)
BILIRUB SERPL-MCNC: 0.6 MG/DL (ref 0.1–1)
BUN SERPL-MCNC: 14 MG/DL (ref 6–20)
CALCIUM SERPL-MCNC: 8.3 MG/DL (ref 8.7–10.5)
CHLORIDE SERPL-SCNC: 96 MMOL/L (ref 95–110)
CO2 SERPL-SCNC: 18 MMOL/L (ref 23–29)
CREAT SERPL-MCNC: 1 MG/DL (ref 0.5–1.4)
DELSYS: ABNORMAL
DIFFERENTIAL METHOD: ABNORMAL
EOSINOPHIL # BLD AUTO: 0 K/UL (ref 0–0.5)
EOSINOPHIL NFR BLD: 0 % (ref 0–8)
ERYTHROCYTE [DISTWIDTH] IN BLOOD BY AUTOMATED COUNT: 19.1 % (ref 11.5–14.5)
EST. GFR  (AFRICAN AMERICAN): >60 ML/MIN/1.73 M^2
EST. GFR  (NON AFRICAN AMERICAN): >60 ML/MIN/1.73 M^2
FIO2: 21
GLUCOSE SERPL-MCNC: 262 MG/DL (ref 70–110)
GLUCOSE SERPL-MCNC: 455 MG/DL (ref 70–110)
GLUCOSE SERPL-MCNC: 505 MG/DL (ref 70–110)
HCO3 UR-SCNC: 21.2 MMOL/L (ref 24–28)
HCT VFR BLD AUTO: 36.6 % (ref 37–48.5)
HGB BLD-MCNC: 10.1 G/DL (ref 12–16)
IMM GRANULOCYTES # BLD AUTO: 0.01 K/UL (ref 0–0.04)
IMM GRANULOCYTES NFR BLD AUTO: 0.2 % (ref 0–0.5)
LACTATE SERPL-SCNC: 2.6 MMOL/L (ref 0.5–1.9)
LIPASE SERPL-CCNC: 31 U/L (ref 4–60)
LYMPHOCYTES # BLD AUTO: 1.8 K/UL (ref 1–4.8)
LYMPHOCYTES NFR BLD: 32.3 % (ref 18–48)
MAGNESIUM SERPL-MCNC: 1.9 MG/DL (ref 1.6–2.6)
MCH RBC QN AUTO: 17.1 PG (ref 27–31)
MCHC RBC AUTO-ENTMCNC: 27.6 G/DL (ref 32–36)
MCV RBC AUTO: 62 FL (ref 82–98)
MODE: ABNORMAL
MONOCYTES # BLD AUTO: 0.3 K/UL (ref 0.3–1)
MONOCYTES NFR BLD: 5.2 % (ref 4–15)
NEUTROPHILS # BLD AUTO: 3.5 K/UL (ref 1.8–7.7)
NEUTROPHILS NFR BLD: 61.8 % (ref 38–73)
NRBC BLD-RTO: 1 /100 WBC
OSMOLALITY SERPL: 288 MOSM/KG (ref 275–295)
PCO2 BLDA: 35 MMHG (ref 35–45)
PH SMN: 7.39 [PH] (ref 7.35–7.45)
PLATELET # BLD AUTO: 260 K/UL (ref 150–450)
PMV BLD AUTO: ABNORMAL FL (ref 9.2–12.9)
PO2 BLDA: 28 MMHG (ref 40–60)
POC BE: -4 MMOL/L
POC SATURATED O2: 53 % (ref 95–100)
POC TCO2: 22 MMOL/L (ref 24–29)
POTASSIUM SERPL-SCNC: 4.1 MMOL/L (ref 3.5–5.1)
PROT SERPL-MCNC: 7.7 G/DL (ref 6–8.4)
RBC # BLD AUTO: 5.9 M/UL (ref 4–5.4)
SAMPLE: ABNORMAL
SARS-COV-2 RDRP RESP QL NAA+PROBE: POSITIVE
SITE: ABNORMAL
SODIUM SERPL-SCNC: 126 MMOL/L (ref 136–145)
TSH SERPL DL<=0.005 MIU/L-ACNC: 1.38 UIU/ML (ref 0.34–5.6)
WBC # BLD AUTO: 5.61 K/UL (ref 3.9–12.7)

## 2022-01-11 PROCEDURE — 80053 COMPREHEN METABOLIC PANEL: CPT | Performed by: STUDENT IN AN ORGANIZED HEALTH CARE EDUCATION/TRAINING PROGRAM

## 2022-01-11 PROCEDURE — 12000002 HC ACUTE/MED SURGE SEMI-PRIVATE ROOM

## 2022-01-11 PROCEDURE — 99900035 HC TECH TIME PER 15 MIN (STAT)

## 2022-01-11 PROCEDURE — 85025 COMPLETE CBC W/AUTO DIFF WBC: CPT | Performed by: STUDENT IN AN ORGANIZED HEALTH CARE EDUCATION/TRAINING PROGRAM

## 2022-01-11 PROCEDURE — 82803 BLOOD GASES ANY COMBINATION: CPT

## 2022-01-11 PROCEDURE — 83605 ASSAY OF LACTIC ACID: CPT | Performed by: STUDENT IN AN ORGANIZED HEALTH CARE EDUCATION/TRAINING PROGRAM

## 2022-01-11 PROCEDURE — 99285 EMERGENCY DEPT VISIT HI MDM: CPT | Mod: 25

## 2022-01-11 PROCEDURE — 96361 HYDRATE IV INFUSION ADD-ON: CPT

## 2022-01-11 PROCEDURE — 83930 ASSAY OF BLOOD OSMOLALITY: CPT | Performed by: STUDENT IN AN ORGANIZED HEALTH CARE EDUCATION/TRAINING PROGRAM

## 2022-01-11 PROCEDURE — 94761 N-INVAS EAR/PLS OXIMETRY MLT: CPT | Mod: XB

## 2022-01-11 PROCEDURE — 25000003 PHARM REV CODE 250: Performed by: STUDENT IN AN ORGANIZED HEALTH CARE EDUCATION/TRAINING PROGRAM

## 2022-01-11 PROCEDURE — 96360 HYDRATION IV INFUSION INIT: CPT

## 2022-01-11 PROCEDURE — 82962 GLUCOSE BLOOD TEST: CPT

## 2022-01-11 PROCEDURE — 83036 HEMOGLOBIN GLYCOSYLATED A1C: CPT | Performed by: FAMILY MEDICINE

## 2022-01-11 PROCEDURE — 82010 KETONE BODYS QUAN: CPT | Performed by: STUDENT IN AN ORGANIZED HEALTH CARE EDUCATION/TRAINING PROGRAM

## 2022-01-11 PROCEDURE — 84443 ASSAY THYROID STIM HORMONE: CPT | Performed by: STUDENT IN AN ORGANIZED HEALTH CARE EDUCATION/TRAINING PROGRAM

## 2022-01-11 PROCEDURE — 83735 ASSAY OF MAGNESIUM: CPT | Performed by: STUDENT IN AN ORGANIZED HEALTH CARE EDUCATION/TRAINING PROGRAM

## 2022-01-11 PROCEDURE — U0002 COVID-19 LAB TEST NON-CDC: HCPCS | Performed by: EMERGENCY MEDICINE

## 2022-01-11 PROCEDURE — G0378 HOSPITAL OBSERVATION PER HR: HCPCS

## 2022-01-11 PROCEDURE — 83690 ASSAY OF LIPASE: CPT | Performed by: STUDENT IN AN ORGANIZED HEALTH CARE EDUCATION/TRAINING PROGRAM

## 2022-01-11 RX ORDER — INSULIN ASPART 100 [IU]/ML
1-10 INJECTION, SOLUTION INTRAVENOUS; SUBCUTANEOUS
Status: DISCONTINUED | OUTPATIENT
Start: 2022-01-11 | End: 2022-01-13 | Stop reason: HOSPADM

## 2022-01-11 RX ORDER — IBUPROFEN 200 MG
24 TABLET ORAL
Status: DISCONTINUED | OUTPATIENT
Start: 2022-01-11 | End: 2022-01-13 | Stop reason: HOSPADM

## 2022-01-11 RX ORDER — IBUPROFEN 200 MG
16 TABLET ORAL
Status: DISCONTINUED | OUTPATIENT
Start: 2022-01-11 | End: 2022-01-13 | Stop reason: HOSPADM

## 2022-01-11 RX ORDER — GLUCAGON 1 MG
1 KIT INJECTION
Status: DISCONTINUED | OUTPATIENT
Start: 2022-01-11 | End: 2022-01-13 | Stop reason: HOSPADM

## 2022-01-11 RX ADMIN — SODIUM CHLORIDE 1000 ML: 9 INJECTION, SOLUTION INTRAVENOUS at 07:01

## 2022-01-11 RX ADMIN — SODIUM CHLORIDE 1000 ML: 0.9 INJECTION, SOLUTION INTRAVENOUS at 10:01

## 2022-01-12 PROBLEM — E87.1 HYPONATREMIA: Status: ACTIVE | Noted: 2022-01-12

## 2022-01-12 PROBLEM — U07.1 COVID-19: Status: ACTIVE | Noted: 2022-01-12

## 2022-01-12 PROBLEM — O24.419 GESTATIONAL DIABETES MELLITUS (GDM) IN THIRD TRIMESTER: Status: ACTIVE | Noted: 2022-01-12

## 2022-01-12 LAB
BACTERIA #/AREA URNS HPF: ABNORMAL /HPF
BILIRUB UR QL STRIP: NEGATIVE
CLARITY UR: CLEAR
COLOR UR: YELLOW
ESTIMATED AVG GLUCOSE: 332 MG/DL (ref 68–131)
GLUCOSE SERPL-MCNC: 155 MG/DL (ref 70–110)
GLUCOSE SERPL-MCNC: 171 MG/DL (ref 70–110)
GLUCOSE SERPL-MCNC: 178 MG/DL (ref 70–110)
GLUCOSE SERPL-MCNC: 201 MG/DL (ref 70–110)
GLUCOSE SERPL-MCNC: 206 MG/DL (ref 70–110)
GLUCOSE SERPL-MCNC: 214 MG/DL (ref 70–110)
GLUCOSE SERPL-MCNC: 242 MG/DL (ref 70–110)
GLUCOSE SERPL-MCNC: 288 MG/DL (ref 70–110)
GLUCOSE SERPL-MCNC: 335 MG/DL (ref 70–110)
GLUCOSE UR QL STRIP: ABNORMAL
HBA1C MFR BLD: 13.2 % (ref 4.5–6.2)
HGB UR QL STRIP: NEGATIVE
HYALINE CASTS #/AREA URNS LPF: 14 /LPF
KETONES UR QL STRIP: ABNORMAL
LEUKOCYTE ESTERASE UR QL STRIP: ABNORMAL
MICROSCOPIC COMMENT: ABNORMAL
NITRITE UR QL STRIP: NEGATIVE
PH UR STRIP: 6 [PH] (ref 5–8)
PROT UR QL STRIP: NEGATIVE
RBC #/AREA URNS HPF: 0 /HPF (ref 0–4)
SP GR UR STRIP: 1.02 (ref 1–1.03)
SQUAMOUS #/AREA URNS HPF: 6 /HPF
URN SPEC COLLECT METH UR: ABNORMAL
UROBILINOGEN UR STRIP-ACNC: NEGATIVE EU/DL
WBC #/AREA URNS HPF: 28 /HPF (ref 0–5)
YEAST URNS QL MICRO: ABNORMAL

## 2022-01-12 PROCEDURE — 96372 THER/PROPH/DIAG INJ SC/IM: CPT | Mod: 59

## 2022-01-12 PROCEDURE — 81001 URINALYSIS AUTO W/SCOPE: CPT | Performed by: STUDENT IN AN ORGANIZED HEALTH CARE EDUCATION/TRAINING PROGRAM

## 2022-01-12 PROCEDURE — G0378 HOSPITAL OBSERVATION PER HR: HCPCS

## 2022-01-12 PROCEDURE — 63600175 PHARM REV CODE 636 W HCPCS: Performed by: FAMILY MEDICINE

## 2022-01-12 PROCEDURE — 63600175 PHARM REV CODE 636 W HCPCS: Performed by: SPECIALIST

## 2022-01-12 PROCEDURE — 36415 COLL VENOUS BLD VENIPUNCTURE: CPT | Performed by: FAMILY MEDICINE

## 2022-01-12 RX ORDER — INSULIN ASPART 100 [IU]/ML
6 INJECTION, SOLUTION INTRAVENOUS; SUBCUTANEOUS
Status: DISCONTINUED | OUTPATIENT
Start: 2022-01-12 | End: 2022-01-13

## 2022-01-12 RX ADMIN — INSULIN ASPART 1 UNITS: 100 INJECTION, SOLUTION INTRAVENOUS; SUBCUTANEOUS at 11:01

## 2022-01-12 RX ADMIN — INSULIN ASPART 6 UNITS: 100 INJECTION, SOLUTION INTRAVENOUS; SUBCUTANEOUS at 08:01

## 2022-01-12 RX ADMIN — INSULIN ASPART 2 UNITS: 100 INJECTION, SOLUTION INTRAVENOUS; SUBCUTANEOUS at 09:01

## 2022-01-12 RX ADMIN — INSULIN ASPART 2 UNITS: 100 INJECTION, SOLUTION INTRAVENOUS; SUBCUTANEOUS at 06:01

## 2022-01-12 RX ADMIN — INSULIN ASPART 2 UNITS: 100 INJECTION, SOLUTION INTRAVENOUS; SUBCUTANEOUS at 04:01

## 2022-01-12 RX ADMIN — INSULIN ASPART 2 UNITS: 100 INJECTION, SOLUTION INTRAVENOUS; SUBCUTANEOUS at 11:01

## 2022-01-12 RX ADMIN — HUMAN INSULIN 10 UNITS: 100 INJECTION, SOLUTION SUBCUTANEOUS at 08:01

## 2022-01-12 RX ADMIN — INSULIN ASPART 4 UNITS: 100 INJECTION, SOLUTION INTRAVENOUS; SUBCUTANEOUS at 02:01

## 2022-01-12 RX ADMIN — INSULIN ASPART 4 UNITS: 100 INJECTION, SOLUTION INTRAVENOUS; SUBCUTANEOUS at 12:01

## 2022-01-12 RX ADMIN — INSULIN ASPART 3 UNITS: 100 INJECTION, SOLUTION INTRAVENOUS; SUBCUTANEOUS at 02:01

## 2022-01-12 NOTE — H&P
[facility]  Obstetrics  History & Physical    Patient Name: Allison Shaikh  MRN: [id number]  Admission Date: 2022  Primary Care Provider: Primary Doctor No    Subjective:     Principal Problem:Gestational diabetes mellitus (GDM) in third trimester    History of Present Illness:  No notes on file    Obstetric HPI:  Patient reports increased frequency of urination of last several days.  Realized these were signs of diabetes secondary to relative with diabetes and using relatives glucometer at home registered high and presented to emergency room.  Patient states active fetal movement.  Patient denies any other complaints.  Patient coincidentally did test positive for COVID in the emergency room but denies any symptoms.  ER evaluation confirms elevated glucose but no DKA.    OB History    Para Term  AB Living   2 0 0 0 1 0   SAB IAB Ectopic Multiple Live Births   1 0 0 0 0      # Outcome Date GA Lbr Mariusz/2nd Weight Sex Delivery Anes PTL Lv   2 Current            1 SAB 21     SAB        Past Medical History:   Diagnosis Date    Anxiety     Asthma     Eczema      No past surgical history on file.    PTA Medications   Medication Sig    prenatal no122/iron/folic acid (PRENATAL MULTI ORAL) Take 1 tablet by mouth once daily.    albuterol (ACCUNEB) 0.63 mg/3 mL Nebu Take 0.63 mg by nebulization every 6 (six) hours as needed.    albuterol (PROVENTIL/VENTOLIN HFA) 90 mcg/actuation inhaler Inhale 2 puffs into the lungs every 6 (six) hours as needed for Wheezing.    ferrous sulfate (FEOSOL) 325 mg (65 mg iron) Tab tablet Take 1 tablet (325 mg total) by mouth once daily. (Patient not taking: No sig reported)    fluticasone propionate (FLONASE) 50 mcg/actuation nasal spray 1 spray (50 mcg total) by Each Nostril route 2 (two) times daily as needed for Rhinitis or Allergies.    metoclopramide HCl (REGLAN) 10 MG tablet Take 1 tablet (10 mg total) by mouth daily as needed (headache).     ondansetron (ZOFRAN-ODT) 4 MG TbDL Take 1 tablet (4 mg total) by mouth every 8 (eight) hours as needed. (Patient not taking: No sig reported)       Review of patient's allergies indicates:  No Known Allergies     Family History     Problem Relation (Age of Onset)    Ovarian cancer Paternal Grandmother        Tobacco Use    Smoking status: Never Smoker    Smokeless tobacco: Never Used   Substance and Sexual Activity    Alcohol use: No    Drug use: No    Sexual activity: Yes     Partners: Male     Birth control/protection: Condom     Review of Systems   Objective:     Vital Signs (Most Recent):  Temp: 98.3 °F (36.8 °C) (22)  Pulse: 67 (22)  Resp: 17 (22)  BP: 127/80 (22)  SpO2: 100 % (22) Vital Signs (24h Range):  Temp:  [98 °F (36.7 °C)-98.3 °F (36.8 °C)] 98.3 °F (36.8 °C)  Pulse:  [66-98] 67  Resp:  [17-] 17  SpO2:  [100 %] 100 %  BP: (127-165)/() 127/80     Weight: 92.1 kg (203 lb)  Body mass index is 31.79 kg/m².    FHT:  Baseline 140s and reassuring   TOCO: No contraction pattern    Physical Exam    General-alert and oriented-no apparent distress  Abdomen/uterus-gravid and nontender  Extremities-no calf tenderness    Cervix:  Deferred     Significant Labs:  Lab Results   Component Value Date    GROUPTRH O POS 2021       CMP:   Recent Labs   Lab 22  1950   *   CALCIUM 8.3*   ALBUMIN 3.2*   PROT 7.7   *   K 4.1   CO2 18*   CL 96   BUN 14   CREATININE 1.0   ALKPHOS 142*   ALT 17   AST 24   BILITOT 0.6     I have personallly reviewed all pertinent lab results from the last 24 hours.    Assessment/Plan:     26 y.o. female  at 29w4d for:    * Gestational diabetes mellitus (GDM) in third trimester  IUP at 29 weeks gestational age-obstetrically stable    Gestational diabetes-previously undiagnosed, uncontrolled on admission.  Patient currently on sliding scale.  Patient has  received a total of 8 units insulin based on  sliding scale with last glucose level registering 214.   Will start NPH 10 units in a.m., Aspart 6 units in am.  With sliding scale to continue for postprandial glucose levels.      Nutrition consult today for diet changes and glucometer instruction    COVID-19 positive-asymptomatic        Gerald Bernal MD  Obstetrics  [facility]

## 2022-01-12 NOTE — ASSESSMENT & PLAN NOTE
IUP at 29 weeks gestational age-obstetrically stable    Gestational diabetes-previously undiagnosed, uncontrolled on admission.  Patient currently on sliding scale.  Patient has  received a total of 8 units insulin based on sliding scale with last glucose level registering 214.   Will start NPH 10 units in a.m., Aspart 6 units in am.  With sliding scale to continue for postprandial glucose levels.      Nutrition consult today for diet changes and glucometer instruction    COVID-19 positive-asymptomatic

## 2022-01-12 NOTE — HOSPITAL COURSE
26-year-old  2 para 0010 at 29 weeks gestational age admitted through the emergency room secondary to elevated glucose levels.  Patient states having frequent urination at home used relatives glue, or which registered high.  On admission through the emergency room glucose level was 453. Patient had not yet done 2nd set of labs including gestational diabetes screening test.

## 2022-01-12 NOTE — CONSULTS
"[facility] Medicine   CONSULT    Patient Name: Allison Shaikh  MRN: [id number]  Admission Date: 2022  6:47 PM  Attending Physician: Odessa Sullivan MD  Primary Care Provider: Fabby Pace NP  Face-to-Face encounter date: 2022    Patient information was obtained from patient, past medical records, ER physician, and ER records.     HISTORY OF PRESENT ILLNESS:     Allison Shaikh is a 26 y.o. Black or  female   With PMH of asthma, pregnancy  who presents with hyperglycemia.    We are consulted for medical management of hyperglycemia.    Pt reports she has gestational diabetes  Denies having DM previous to this pregnancy  She reports she isn't on medication for diabetes  She reports that she feels "fine"  She reports she wouldn't have come to the ER except her mother insisted  She denies any complaints except hunger  No n/v  No chest pain, no SOB  No fever  She denies chills but reports she is always cold due to chronic anemia  Denies increased urination  Denies increased thirst  She reports that she is very hungry    REVIEW OF SYSTEMS:     All systems reviewed and are negative except as noted per above.    PAST MEDICAL HISTORY:     Past Medical History:   Diagnosis Date    Anxiety     Asthma     Eczema        PAST SURGICAL HISTORY:   No past surgeries    ALLERGIES:   nkda    FAMILY HISTORY:     Family History   Problem Relation Age of Onset    Ovarian cancer Paternal Grandmother     Breast cancer Neg Hx     Cancer Neg Hx     Colon cancer Neg Hx     Diabetes Neg Hx     Eclampsia Neg Hx     Hypertension Neg Hx     Miscarriages / Stillbirths Neg Hx      labor Neg Hx     Stroke Neg Hx        SOCIAL HISTORY:     Social History     Tobacco Use    Smoking status: Never Smoker    Smokeless tobacco: Never Used   Substance Use Topics    Alcohol use: No        Social History     Substance and Sexual Activity   Sexual Activity Yes    Partners: Male " "   Birth control/protection: Condom        HOME MEDICATIONS:     Prior to Admission medications    Medication Sig Start Date End Date Taking? Authorizing Provider   prenatal no122/iron/folic acid (PRENATAL MULTI ORAL) Take 1 tablet by mouth once daily.   Yes Historical Provider   albuterol (ACCUNEB) 0.63 mg/3 mL Nebu Take 0.63 mg by nebulization every 6 (six) hours as needed.    Historical Provider   albuterol (PROVENTIL/VENTOLIN HFA) 90 mcg/actuation inhaler Inhale 2 puffs into the lungs every 6 (six) hours as needed for Wheezing.    Historical Provider   ferrous sulfate (FEOSOL) 325 mg (65 mg iron) Tab tablet Take 1 tablet (325 mg total) by mouth once daily.  Patient not taking: No sig reported 2/22/20   Shaylee Golden PA-C   fluticasone propionate (FLONASE) 50 mcg/actuation nasal spray 1 spray (50 mcg total) by Each Nostril route 2 (two) times daily as needed for Rhinitis or Allergies. 11/28/20   Jayla Johansen PA-C   metoclopramide HCl (REGLAN) 10 MG tablet Take 1 tablet (10 mg total) by mouth daily as needed (headache). 10/17/21   Arely Richardson NP   ondansetron (ZOFRAN-ODT) 4 MG TbDL Take 1 tablet (4 mg total) by mouth every 8 (eight) hours as needed.  Patient not taking: No sig reported 12/19/19   Peter Golden MD   levonorgestrel-ethinyl estradiol (AVIANE,ALESSE,LESSINA) 0.1-20 mg-mcg per tablet Take 1 tablet by mouth once daily. 9/30/20 4/18/21  Caitie Gregg NP-C       PHYSICAL EXAM:     BP (!) 135/96   Pulse 74   Temp 98.3 °F (36.8 °C) (Oral)   Resp (!) 22   Ht 5' 7" (1.702 m)   Wt 92.1 kg (203 lb)   LMP 06/19/2021   SpO2 100%   BMI 31.79 kg/m²     Gen: alert, responsive  HEENT:  Eyes - no pallor  External ears with no lesions  Nares patent  Mouth - lips chapped  CV: RRR  Lungs: CTA B/L  Abd: +BS, soft, NT, ND; +gravid  Ext: no atrophy or edema  Skin: warm, dry  Neuro: grossly intact  Psych: pleasant     LABS AND IMAGING:     Labs Reviewed   CBC W/ AUTO DIFFERENTIAL - " Abnormal; Notable for the following components:       Result Value    RBC 5.90 (*)     Hemoglobin 10.1 (*)     Hematocrit 36.6 (*)     MCV 62 (*)     MCH 17.1 (*)     MCHC 27.6 (*)     RDW 19.1 (*)     nRBC 1 (*)     All other components within normal limits   COMPREHENSIVE METABOLIC PANEL - Abnormal; Notable for the following components:    Sodium 126 (*)     CO2 18 (*)     Glucose 455 (*)     Calcium 8.3 (*)     Albumin 3.2 (*)     Alkaline Phosphatase 142 (*)     All other components within normal limits    Narrative:     glu critical result(s) repeated. Called and verbal readback obtained   from shakir arias rn er  [facility] 01/11/2022 20:37   LACTIC ACID, PLASMA - Abnormal; Notable for the following components:    Lactate (Lactic Acid) 2.6 (*)     All other components within normal limits    Narrative:      lactic acid critical result(s) repeated. Called and verbal readback   obtained from anibal gamez rn er  [facility] [place] 01/11/2022 21:00   SARS-COV-2 RNA AMPLIFICATION, QUAL - Abnormal; Notable for the following components:    SARS-CoV-2 RNA, Amplification, Qual Positive (*)     All other components within normal limits   POCT GLUCOSE - Abnormal; Notable for the following components:    POC Glucose 505 (*)     All other components within normal limits   ISTAT PROCEDURE - Abnormal; Notable for the following components:    POC PO2 28 (*)     POC HCO3 21.2 (*)     POC SATURATED O2 53 (*)     POC TCO2 22 (*)     All other components within normal limits   LIPASE   MAGNESIUM   TSH   BETA - HYDROXYBUTYRATE, SERUM   OSMOLALITY, SERUM   URINALYSIS       No orders to display       ASSESSMENT & PLAN:   Allison Shaikh is a 26 y.o. female admitted for    Hyperglycemia in pregnancy / Gestational diabetes  - NS bolus given in ER  - negative studies for DKA  - sliding scale insulin  - accuchecks  - HbA1c  - diabetic diet    Further plan per OB- GYN.    Odessa Sullivan MD  [facility]  01/11/2022

## 2022-01-12 NOTE — ED PROVIDER NOTES
"Encounter Date: 2022       History     Chief Complaint   Patient presents with    Hyperglycemia     BLOOD SUGAR HIGH AT HOME, CHECKED BY MOM. READ HI. APPROX 29 WEEKS PREG    Urinary Frequency     HPI     Ms. Allison Shaikh is a 25 y/o F, 29 weeks gestation, first pregnancy, who has presented for hyperglycemia. Patient reports that over the past 2 days she has had increased fatigue, generalized malaise, increased thirst, and increased urinary frequency. She reports that she sees Dr. Bernal as her Ob/gyn (office of Dr. Spike thrasher) and has been seen regularly for prenatal care, but was unable to get her glucose testing due to an insurance issue. She is not aware of any gestational diabetes. However her parents (mom and dad) do both have diabetes. Patient has not been diagnosed with diabetes prior to pregnancy either. Patient denies any dysuria or hematuria. Denies any vaginal bleeding or discharge; reports she has felt the baby move regularly. She does report nausea and 1 episode of vomiting 1 day ago. She also reports cold symptoms last week that are just now resolving. Mom checked patient's glucose at home and it read "high." She brought her to the ER for evaluation. All other ROS negative.       Review of patient's allergies indicates:  No Known Allergies  Past Medical History:   Diagnosis Date    Anxiety     Asthma     Eczema      No past surgical history on file.  Family History   Problem Relation Age of Onset    Ovarian cancer Paternal Grandmother     Breast cancer Neg Hx     Cancer Neg Hx     Colon cancer Neg Hx     Diabetes Neg Hx     Eclampsia Neg Hx     Hypertension Neg Hx     Miscarriages / Stillbirths Neg Hx      labor Neg Hx     Stroke Neg Hx      Social History     Tobacco Use    Smoking status: Never Smoker    Smokeless tobacco: Never Used   Substance Use Topics    Alcohol use: No    Drug use: No     Review of Systems   Constitutional: Positive for activity " change and fatigue. Negative for appetite change, chills and fever.   HENT: Positive for congestion and rhinorrhea. Negative for sneezing and sore throat.    Eyes: Negative for photophobia, redness and visual disturbance.   Respiratory: Positive for cough. Negative for shortness of breath and wheezing.    Cardiovascular: Negative for chest pain and leg swelling.   Gastrointestinal: Positive for nausea and vomiting. Negative for abdominal pain and diarrhea.   Endocrine: Positive for polydipsia and polyuria.   Genitourinary: Positive for frequency. Negative for hematuria, urgency and vaginal bleeding.   Musculoskeletal: Negative for arthralgias, back pain and myalgias.   Skin: Negative for color change, pallor and wound.   Neurological: Positive for headaches. Negative for dizziness, weakness and numbness.   Hematological: Does not bruise/bleed easily.   Psychiatric/Behavioral: Negative for agitation, behavioral problems and confusion.   All other systems reviewed and are negative.      Physical Exam     Initial Vitals [01/11/22 1854]   BP Pulse Resp Temp SpO2   (!) 165/106 98 (!) 22 98.3 °F (36.8 °C) 100 %      MAP       --         Physical Exam    Nursing note and vitals reviewed.  Constitutional: She appears well-developed and well-nourished. She is not diaphoretic. No distress.   26 year old female, alert and oriented, speaking in full sentences   HENT:   Head: Normocephalic and atraumatic.   Right Ear: External ear normal.   Left Ear: External ear normal.   Nose: Nose normal.   Mouth/Throat: Oropharynx is clear and moist. Mucous membranes are dry.   Eyes: Conjunctivae and EOM are normal. Pupils are equal, round, and reactive to light.   Neck: Neck supple. No tracheal deviation present.   Normal range of motion.  Cardiovascular: Normal rate, regular rhythm, normal heart sounds and intact distal pulses.   No murmur heard.  Pulmonary/Chest: Breath sounds normal. No stridor. No respiratory distress. She has no  wheezes. She exhibits no tenderness.   Abdominal: Abdomen is soft. Bowel sounds are normal. She exhibits no distension. There is no abdominal tenderness.   Gravid abdomen, non-tender There is no guarding.   Musculoskeletal:         General: No tenderness or edema. Normal range of motion.      Cervical back: Normal range of motion and neck supple.     Neurological: She is alert and oriented to person, place, and time. She has normal strength. GCS score is 15. GCS eye subscore is 4. GCS verbal subscore is 5. GCS motor subscore is 6.   Skin: Skin is warm and dry. Capillary refill takes less than 2 seconds. No erythema. No pallor.   Psychiatric: She has a normal mood and affect. Thought content normal.         ED Course   Procedures  Labs Reviewed   CBC W/ AUTO DIFFERENTIAL - Abnormal; Notable for the following components:       Result Value    RBC 5.90 (*)     Hemoglobin 10.1 (*)     Hematocrit 36.6 (*)     MCV 62 (*)     MCH 17.1 (*)     MCHC 27.6 (*)     RDW 19.1 (*)     nRBC 1 (*)     All other components within normal limits   COMPREHENSIVE METABOLIC PANEL - Abnormal; Notable for the following components:    Sodium 126 (*)     CO2 18 (*)     Glucose 455 (*)     Calcium 8.3 (*)     Albumin 3.2 (*)     Alkaline Phosphatase 142 (*)     All other components within normal limits    Narrative:     glu critical result(s) repeated. Called and verbal readback obtained   from shakir arias rn er  by [facility] 01/11/2022 20:37   LACTIC ACID, PLASMA - Abnormal; Notable for the following components:    Lactate (Lactic Acid) 2.6 (*)     All other components within normal limits    Narrative:      lactic acid critical result(s) repeated. Called and verbal readback   obtained from anibal gamez rn er  by [facility] 01/11/2022 21:00   SARS-COV-2 RNA AMPLIFICATION, QUAL - Abnormal; Notable for the following components:    SARS-CoV-2 RNA, Amplification, Qual Positive (*)     All other components within normal limits   POCT GLUCOSE - Abnormal; Notable  "for the following components:    POC Glucose 505 (*)     All other components within normal limits   ISTAT PROCEDURE - Abnormal; Notable for the following components:    POC PO2 28 (*)     POC HCO3 21.2 (*)     POC SATURATED O2 53 (*)     POC TCO2 22 (*)     All other components within normal limits   LIPASE   MAGNESIUM   TSH   BETA - HYDROXYBUTYRATE, SERUM   OSMOLALITY, SERUM   URINALYSIS   HEMOGLOBIN A1C          Imaging Results    None          Medications   sodium chloride 0.9% bolus 1,000 mL (1,000 mLs Intravenous New Bag 22)   glucose chewable tablet 16 g (has no administration in time range)   glucose chewable tablet 24 g (has no administration in time range)   dextrose 50% injection 12.5 g (has no administration in time range)   dextrose 50% injection 25 g (has no administration in time range)   glucagon (human recombinant) injection 1 mg (has no administration in time range)   insulin aspart U-100 pen 1-10 Units (has no administration in time range)   sodium chloride 0.9% bolus 1,000 mL (0 mLs Intravenous Stopped 22)     Medical Decision Making:   Initial Assessment:   26 year old female , 29 weeks gestation, who has presented for hyperglycemia and generalized malaise. Patient has had cold-like symptoms for 1 week, with increase fatigue, thirst, polyuria over the last few days, with episode of vomiting. Mom checked glucose at home and read "high." No history of diabetes pre pregnancy, no known gestational diabetes.  Patient arrives hypertensive 165/106, with repeat 135/93, otherwise vitals stable. 100% spO2 on room air. Well appearing 26 year old female, not in distress. Alert and oriented, speaking in full sentences. Gravid abdomen, non-tender. Patient appears to have dry mucus membranes. Otherwise benign exam.   Differential Diagnosis:   DKA, hyperglycemia, pre-eclampsia, covid, influenza, pneumonia, gastroenteritis  Clinical Tests:   Lab Tests: Ordered and Reviewed  ED " Management:  Work-up significant for hyperglycemia with glucose 455. Anion gap 12. CO2 18. Lactic 2.6. Beta hydroxy 0.2. pH is 7.39. Not in DKA at this time; not acidotic and no anion gap. Patient is also Covid positive. Patient received 2L of fluids. Consulted to Dr. Bernal of Ob/Gyn. Patient to be admitted to Ob/Gyn on labor and delivery floor for fetal monitoring. Requested hospital medicine to be consulted for help with hyperglycemia. Hospital medicine has been consulted at this time. Patient is pending admission.    Marisela Thapa MD  PGY3 [employer] EM.   Other:   I have discussed this case with another health care provider.       <> Summary of the Discussion: Dr. Bernal with Ob/gyn                      Clinical Impression:   Final diagnoses:  [R73.9] Hyperglycemia          ED Disposition Condition    Admit               Marisela Thapa MD  Resident  01/11/22 2341

## 2022-01-12 NOTE — SUBJECTIVE & OBJECTIVE
Obstetric HPI:  Patient reports increased frequency of urination of last several days.  Realized these were signs of diabetes secondary to relative with diabetes and using relatives glucometer at home registered high and presented to emergency room.  Patient states active fetal movement.  Patient denies any other complaints.  Patient coincidentally did test positive for COVID in the emergency room but denies any symptoms.  ER evaluation confirms elevated glucose but no DKA.    OB History    Para Term  AB Living   2 0 0 0 1 0   SAB IAB Ectopic Multiple Live Births   1 0 0 0 0      # Outcome Date GA Lbr Mariusz/2nd Weight Sex Delivery Anes PTL Lv   2 Current            1 SAB 21     SAB        Past Medical History:   Diagnosis Date    Anxiety     Asthma     Eczema      No past surgical history on file.    PTA Medications   Medication Sig    prenatal no122/iron/folic acid (PRENATAL MULTI ORAL) Take 1 tablet by mouth once daily.    albuterol (ACCUNEB) 0.63 mg/3 mL Nebu Take 0.63 mg by nebulization every 6 (six) hours as needed.    albuterol (PROVENTIL/VENTOLIN HFA) 90 mcg/actuation inhaler Inhale 2 puffs into the lungs every 6 (six) hours as needed for Wheezing.    ferrous sulfate (FEOSOL) 325 mg (65 mg iron) Tab tablet Take 1 tablet (325 mg total) by mouth once daily. (Patient not taking: No sig reported)    fluticasone propionate (FLONASE) 50 mcg/actuation nasal spray 1 spray (50 mcg total) by Each Nostril route 2 (two) times daily as needed for Rhinitis or Allergies.    metoclopramide HCl (REGLAN) 10 MG tablet Take 1 tablet (10 mg total) by mouth daily as needed (headache).    ondansetron (ZOFRAN-ODT) 4 MG TbDL Take 1 tablet (4 mg total) by mouth every 8 (eight) hours as needed. (Patient not taking: No sig reported)       Review of patient's allergies indicates:  No Known Allergies     Family History     Problem Relation (Age of Onset)    Ovarian cancer Paternal Grandmother        Tobacco Use     Smoking status: Never Smoker    Smokeless tobacco: Never Used   Substance and Sexual Activity    Alcohol use: No    Drug use: No    Sexual activity: Yes     Partners: Male     Birth control/protection: Condom     Review of Systems   Objective:     Vital Signs (Most Recent):  Temp: 98.3 °F (36.8 °C) (01/12/22 0426)  Pulse: 67 (01/12/22 0426)  Resp: 17 (01/11/22 2310)  BP: 127/80 (01/12/22 0426)  SpO2: 100 % (01/11/22 2310) Vital Signs (24h Range):  Temp:  [98 °F (36.7 °C)-98.3 °F (36.8 °C)] 98.3 °F (36.8 °C)  Pulse:  [66-98] 67  Resp:  [17-22] 17  SpO2:  [100 %] 100 %  BP: (127-165)/() 127/80     Weight: 92.1 kg (203 lb)  Body mass index is 31.79 kg/m².    FHT:  Baseline 140s and reassuring   TOCO: No contraction pattern    Physical Exam    General-alert and oriented-no apparent distress  Abdomen/uterus-gravid and nontender  Extremities-no calf tenderness    Cervix:  Deferred     Significant Labs:  Lab Results   Component Value Date    GROUPTRH O POS 08/08/2021       CMP:   Recent Labs   Lab 01/11/22  1950   *   CALCIUM 8.3*   ALBUMIN 3.2*   PROT 7.7   *   K 4.1   CO2 18*   CL 96   BUN 14   CREATININE 1.0   ALKPHOS 142*   ALT 17   AST 24   BILITOT 0.6     I have personallly reviewed all pertinent lab results from the last 24 hours.

## 2022-01-12 NOTE — CONSULTS
Consult received for gestational diabetes education.     Reviewed gestational diabetes education with patient (carbohydrate counting, food label reading, plate method, low blood sugars). Recommended for patient to check blood sugar 4x/day, adjust carbohydrate intake as needed and follow with RD outpatient.     Discussed how check blood sugars.    Provided blood sugar log for patient to keep and bring to her appointments.    Notified RN that patient needs a prescription for glucometer and supplies to check blood sugar 4x/day.     Patient voiced understanding of the information given.    Contact information and handouts provided.

## 2022-01-13 VITALS
RESPIRATION RATE: 18 BRPM | OXYGEN SATURATION: 100 % | TEMPERATURE: 98 F | WEIGHT: 203 LBS | SYSTOLIC BLOOD PRESSURE: 142 MMHG | HEIGHT: 67 IN | HEART RATE: 62 BPM | DIASTOLIC BLOOD PRESSURE: 96 MMHG | BODY MASS INDEX: 31.86 KG/M2

## 2022-01-13 DIAGNOSIS — U07.1 COVID-19 VIRUS DETECTED: ICD-10-CM

## 2022-01-13 DIAGNOSIS — Z36.89 ENCOUNTER FOR FETAL ANATOMIC SURVEY: Primary | ICD-10-CM

## 2022-01-13 LAB
ALBUMIN SERPL BCP-MCNC: 2.8 G/DL (ref 3.5–5.2)
ALP SERPL-CCNC: 125 U/L (ref 55–135)
ALT SERPL W/O P-5'-P-CCNC: 15 U/L (ref 10–44)
ANION GAP SERPL CALC-SCNC: 10 MMOL/L (ref 8–16)
AST SERPL-CCNC: 28 U/L (ref 10–40)
BILIRUB SERPL-MCNC: 0.6 MG/DL (ref 0.1–1)
BUN SERPL-MCNC: 9 MG/DL (ref 6–20)
CALCIUM SERPL-MCNC: 8.2 MG/DL (ref 8.7–10.5)
CHLORIDE SERPL-SCNC: 105 MMOL/L (ref 95–110)
CO2 SERPL-SCNC: 18 MMOL/L (ref 23–29)
CREAT SERPL-MCNC: 0.7 MG/DL (ref 0.5–1.4)
ERYTHROCYTE [DISTWIDTH] IN BLOOD BY AUTOMATED COUNT: 20 % (ref 11.5–14.5)
EST. GFR  (AFRICAN AMERICAN): >60 ML/MIN/1.73 M^2
EST. GFR  (NON AFRICAN AMERICAN): >60 ML/MIN/1.73 M^2
GLUCOSE SERPL-MCNC: 104 MG/DL (ref 70–110)
GLUCOSE SERPL-MCNC: 120 MG/DL (ref 70–110)
GLUCOSE SERPL-MCNC: 156 MG/DL (ref 70–110)
GLUCOSE SERPL-MCNC: 161 MG/DL (ref 70–110)
GLUCOSE SERPL-MCNC: 78 MG/DL (ref 70–110)
GLUCOSE SERPL-MCNC: 80 MG/DL (ref 70–110)
HCT VFR BLD AUTO: 35.4 % (ref 37–48.5)
HGB BLD-MCNC: 9.6 G/DL (ref 12–16)
MCH RBC QN AUTO: 17.7 PG (ref 27–31)
MCHC RBC AUTO-ENTMCNC: 27.1 G/DL (ref 32–36)
MCV RBC AUTO: 65 FL (ref 82–98)
PLATELET # BLD AUTO: 232 K/UL (ref 150–450)
PMV BLD AUTO: ABNORMAL FL (ref 9.2–12.9)
POTASSIUM SERPL-SCNC: 3.7 MMOL/L (ref 3.5–5.1)
PROT SERPL-MCNC: 6.9 G/DL (ref 6–8.4)
RBC # BLD AUTO: 5.42 M/UL (ref 4–5.4)
SODIUM SERPL-SCNC: 133 MMOL/L (ref 136–145)
WBC # BLD AUTO: 4.94 K/UL (ref 3.9–12.7)

## 2022-01-13 PROCEDURE — 80053 COMPREHEN METABOLIC PANEL: CPT | Performed by: INTERNAL MEDICINE

## 2022-01-13 PROCEDURE — 25000003 PHARM REV CODE 250: Performed by: OBSTETRICS & GYNECOLOGY

## 2022-01-13 PROCEDURE — 85027 COMPLETE CBC AUTOMATED: CPT | Performed by: INTERNAL MEDICINE

## 2022-01-13 PROCEDURE — 96372 THER/PROPH/DIAG INJ SC/IM: CPT | Mod: 59

## 2022-01-13 PROCEDURE — G0378 HOSPITAL OBSERVATION PER HR: HCPCS

## 2022-01-13 PROCEDURE — 63600175 PHARM REV CODE 636 W HCPCS: Performed by: SPECIALIST

## 2022-01-13 PROCEDURE — 36415 COLL VENOUS BLD VENIPUNCTURE: CPT | Performed by: INTERNAL MEDICINE

## 2022-01-13 RX ORDER — INSULIN ASPART 100 [IU]/ML
4 INJECTION, SOLUTION INTRAVENOUS; SUBCUTANEOUS
Status: DISCONTINUED | OUTPATIENT
Start: 2022-01-13 | End: 2022-01-13 | Stop reason: HOSPADM

## 2022-01-13 RX ORDER — ACETAMINOPHEN 500 MG
1000 TABLET ORAL EVERY 6 HOURS PRN
Status: DISCONTINUED | OUTPATIENT
Start: 2022-01-13 | End: 2022-01-13 | Stop reason: HOSPADM

## 2022-01-13 RX ORDER — INSULIN ASPART 100 [IU]/ML
8 INJECTION, SOLUTION INTRAVENOUS; SUBCUTANEOUS
Status: DISCONTINUED | OUTPATIENT
Start: 2022-01-13 | End: 2022-01-13 | Stop reason: HOSPADM

## 2022-01-13 RX ADMIN — INSULIN ASPART 8 UNITS: 100 INJECTION, SOLUTION INTRAVENOUS; SUBCUTANEOUS at 08:01

## 2022-01-13 RX ADMIN — INSULIN ASPART 1 UNITS: 100 INJECTION, SOLUTION INTRAVENOUS; SUBCUTANEOUS at 07:01

## 2022-01-13 RX ADMIN — ACETAMINOPHEN 1000 MG: 500 TABLET, FILM COATED ORAL at 02:01

## 2022-01-13 RX ADMIN — INSULIN ASPART 4 UNITS: 100 INJECTION, SOLUTION INTRAVENOUS; SUBCUTANEOUS at 12:01

## 2022-01-13 RX ADMIN — HUMAN INSULIN 16 UNITS: 100 INJECTION, SOLUTION SUBCUTANEOUS at 08:01

## 2022-01-13 NOTE — SUBJECTIVE & OBJECTIVE
Obstetric HPI:  Patient reports no obstetric complaints, states active fetal movement.  Patient has related to nurses dislike of current diet.  Explained to patient in detail need to comply with diabetic diet secondary to extreme elevation in glucose levels.  Patient states understanding.     Objective:     Vital Signs (Most Recent):  Temp: 98 °F (36.7 °C) (01/12/22 2310)  Pulse: 62 (01/13/22 0342)  Resp: 16 (01/12/22 1548)  BP: 131/84 (01/13/22 0342)  SpO2: 100 % (01/11/22 2310) Vital Signs (24h Range):  Temp:  [98 °F (36.7 °C)-98.4 °F (36.9 °C)] 98 °F (36.7 °C)  Pulse:  [57-74] 62  Resp:  [16] 16  BP: (129-142)/(82-97) 131/84     Weight: 92.1 kg (203 lb)  Body mass index is 31.79 kg/m².    FHT:  Baseline 140s and reassuring  TOCO: No contraction pattern    No intake or output data in the 24 hours ending 01/13/22 0725    Cervical Exam:  Deferred     Significant Labs:  Recent Lab Results       01/12/22  2305   01/12/22  2101   01/12/22  1855   01/12/22  1436   01/12/22  1147        POC Glucose 155   206   201   242   171                        01/12/22  0800        POC Glucose 178             Physical Exam   General-no apparent distress, resting comfortably in bed  Abdomen/uterus nontender  Extremities no calf tenderness

## 2022-01-13 NOTE — PROGRESS NOTES
[facility] Medicine  Consult Progress Note    Patient Name: Allison Shaikh  MRN: [id number]  Patient Class: OP- Observation   Admission Date: 1/11/2022  Length of Stay: 1 days  Attending Physician: Aaron Bernal MD  Primary Care Provider: Primary Doctor No        Subjective:     Principal Problem:Gestational diabetes mellitus (GDM) in third trimester        HPI:  No notes on file    Overview/Hospital Course:  01/12  Blood sugar levels controlled  HbA1C in Higher range      Interval History:     Review of Systems   Constitutional: Negative for activity change and appetite change.   HENT: Negative for congestion and dental problem.    Eyes: Negative for discharge and itching.   Respiratory: Negative for shortness of breath.    Cardiovascular: Negative for chest pain.   Gastrointestinal: Negative for abdominal distention and abdominal pain.   Endocrine: Negative for cold intolerance.   Genitourinary: Negative for difficulty urinating and dysuria.   Musculoskeletal: Negative for arthralgias and back pain.   Skin: Negative for color change.   Neurological: Negative for dizziness and facial asymmetry.   Hematological: Negative for adenopathy.   Psychiatric/Behavioral: Negative for agitation and behavioral problems.     Objective:     Vital Signs (Most Recent):  Temp: 98 °F (36.7 °C) (01/12/22 1149)  Pulse: 74 (01/12/22 1548)  Resp: 16 (01/12/22 1548)  BP: (!) 142/97 (01/12/22 1548)  SpO2: 100 % (01/11/22 2310) Vital Signs (24h Range):  Temp:  [98 °F (36.7 °C)-98.4 °F (36.9 °C)] 98 °F (36.7 °C)  Pulse:  [58-84] 74  Resp:  [16-18] 16  SpO2:  [100 %] 100 %  BP: (127-142)/(80-97) 142/97     Weight: 92.1 kg (203 lb)  Body mass index is 31.79 kg/m².    Intake/Output Summary (Last 24 hours) at 1/12/2022 1919  Last data filed at 1/11/2022 2239  Gross per 24 hour   Intake 2000 ml   Output --   Net 2000 ml      Physical Exam  Vitals and nursing note reviewed.   Constitutional:       General: She is not  in acute distress.  HENT:      Head: Atraumatic.      Right Ear: External ear normal.      Left Ear: External ear normal.      Nose: Nose normal.      Mouth/Throat:      Mouth: Mucous membranes are moist.   Eyes:      General: No scleral icterus.     Extraocular Movements: EOM normal.   Cardiovascular:      Rate and Rhythm: Normal rate.   Pulmonary:      Effort: Pulmonary effort is normal.   Musculoskeletal:         General: No edema. Normal range of motion.      Cervical back: Normal range of motion.   Skin:     General: Skin is warm.   Neurological:      Mental Status: She is alert and oriented to person, place, and time.   Psychiatric:         Mood and Affect: Mood and affect normal.         Significant Labs:   All pertinent labs within the past 24 hours have been reviewed.  CBC:   Recent Labs   Lab 01/11/22 1950   WBC 5.61   HGB 10.1*   HCT 36.6*        CMP:   Recent Labs   Lab 01/11/22 1950   *   K 4.1   CL 96   CO2 18*   *   BUN 14   CREATININE 1.0   CALCIUM 8.3*   PROT 7.7   ALBUMIN 3.2*   BILITOT 0.6   ALKPHOS 142*   AST 24   ALT 17   ANIONGAP 12   EGFRNONAA >60.0       Significant Imaging: I have reviewed all pertinent imaging results/findings within the past 24 hours.      Assessment/Plan:      * Gestational diabetes mellitus (GDM) in third trimester  Need insulin regime along with SSI upon discharge  Strong FH of DM  HbA1C in the range of 13  No evidence of DKA      Hyponatremia  Pseudohyponatremia due to hyperglycemia      COVID-19  Asymptomatic   Conservative management       VTE Risk Mitigation (From admission, onward)    None          Discharge Planning   MARIYA:      Code Status: Not on file   Is the patient medically ready for discharge?:     Reason for patient still in hospital (select all that apply):   Will Sign off . Pt can go home with insulin regime . Please call if in need . Thanks                     Shelton Grace MD  Department of Hospital Medicine   [facility]  [facility]

## 2022-01-13 NOTE — ASSESSMENT & PLAN NOTE
IUP at 29 weeks gestational age-obstetrically stable    Gestational diabetes-previously undiagnosed, uncontrolled on admission.  Hemoglobin A1c= 13.2 on admission.   Will increase patient's NPH 16 units this morning, aspart increased to 8 units this morning, 4 units before lunch, 4 units before dinner.    Nutrition consult happened yesterday, will write order for glucometer    COVID-19 positive-asymptomatic    Will start 24 hour urine    Will order ultrasound    Explained to patient that on discharge will need to continue glucose checks with log book, be compliant with insulin and diet, and will need close follow-up with Maternal Fetal Medicine

## 2022-01-13 NOTE — ASSESSMENT & PLAN NOTE
Need insulin regime along with SSI upon discharge  Strong FH of DM  HbA1C in the range of 13  No evidence of DKA

## 2022-01-13 NOTE — SUBJECTIVE & OBJECTIVE
Interval History:     Review of Systems   Constitutional: Negative for activity change and appetite change.   HENT: Negative for congestion and dental problem.    Eyes: Negative for discharge and itching.   Respiratory: Negative for shortness of breath.    Cardiovascular: Negative for chest pain.   Gastrointestinal: Negative for abdominal distention and abdominal pain.   Endocrine: Negative for cold intolerance.   Genitourinary: Negative for difficulty urinating and dysuria.   Musculoskeletal: Negative for arthralgias and back pain.   Skin: Negative for color change.   Neurological: Negative for dizziness and facial asymmetry.   Hematological: Negative for adenopathy.   Psychiatric/Behavioral: Negative for agitation and behavioral problems.     Objective:     Vital Signs (Most Recent):  Temp: 98 °F (36.7 °C) (01/12/22 1149)  Pulse: 74 (01/12/22 1548)  Resp: 16 (01/12/22 1548)  BP: (!) 142/97 (01/12/22 1548)  SpO2: 100 % (01/11/22 2310) Vital Signs (24h Range):  Temp:  [98 °F (36.7 °C)-98.4 °F (36.9 °C)] 98 °F (36.7 °C)  Pulse:  [58-84] 74  Resp:  [16-18] 16  SpO2:  [100 %] 100 %  BP: (127-142)/(80-97) 142/97     Weight: 92.1 kg (203 lb)  Body mass index is 31.79 kg/m².    Intake/Output Summary (Last 24 hours) at 1/12/2022 1919  Last data filed at 1/11/2022 2239  Gross per 24 hour   Intake 2000 ml   Output --   Net 2000 ml      Physical Exam  Vitals and nursing note reviewed.   Constitutional:       General: She is not in acute distress.  HENT:      Head: Atraumatic.      Right Ear: External ear normal.      Left Ear: External ear normal.      Nose: Nose normal.      Mouth/Throat:      Mouth: Mucous membranes are moist.   Eyes:      General: No scleral icterus.     Extraocular Movements: EOM normal.   Cardiovascular:      Rate and Rhythm: Normal rate.   Pulmonary:      Effort: Pulmonary effort is normal.   Musculoskeletal:         General: No edema. Normal range of motion.      Cervical back: Normal range of motion.    Skin:     General: Skin is warm.   Neurological:      Mental Status: She is alert and oriented to person, place, and time.   Psychiatric:         Mood and Affect: Mood and affect normal.         Significant Labs:   All pertinent labs within the past 24 hours have been reviewed.  CBC:   Recent Labs   Lab 01/11/22 1950   WBC 5.61   HGB 10.1*   HCT 36.6*        CMP:   Recent Labs   Lab 01/11/22 1950   *   K 4.1   CL 96   CO2 18*   *   BUN 14   CREATININE 1.0   CALCIUM 8.3*   PROT 7.7   ALBUMIN 3.2*   BILITOT 0.6   ALKPHOS 142*   AST 24   ALT 17   ANIONGAP 12   EGFRNONAA >60.0       Significant Imaging: I have reviewed all pertinent imaging results/findings within the past 24 hours.

## 2022-01-13 NOTE — PLAN OF CARE
Progressing    3:19 PM Spoke with Dr. Bernal concerning patients PP blood sugar of 80 after lunch. Patient said she felt fine. Stated to decrease Aspart insulin prior to eating dinner and he will be by to write scripts to prepare for discharge.

## 2022-01-13 NOTE — DISCHARGE SUMMARY
[facility]  Obstetrics  Discharge Summary      Patient Name: Allison Shaikh  MRN: [id number]  Admission Date: 2022  Hospital Length of Stay: 1 days  Discharge Date and Time:  2022 4:48 PM  Attending Physician: Aaron Bernal MD   Discharging Provider: Gerald Bernal MD   Primary Care Provider: Gerald Bernal MD    HPI: No notes on file    FHT:  Baseline 130s and reassuring  TOCO: No pattern    * No surgery found *     Hospital Course:   26-year-old  2 para 0010 at 29 weeks gestational age admitted through the emergency room secondary to elevated glucose levels.  Patient states having frequent urination at home used relatives glue, or which registered high.  On admission through the emergency room glucose level was 453. Patient had not yet done 2nd set of labs including gestational diabetes screening test.    Patient was started on NPH and aspart insulin.  Glucose better controlled.  Fetal heart tones have been reassuring.  Ultrasound performed, small for gestational age, good ROGELIO.  Blood pressures mostly normal, occasional increase blood pressure, 24 hour urine will be completed at home and returned to hospital tomorrow, UA on admission negative for protein.  Patient without neurologic complaints.  Patient now has glucometer and both NPH and aspart insulin to go home.  Instructions have been given.  Physical exam unremarkable on discharge, uterus/ abdomen soft , gravid and nontender.     Consults (From admission, onward)        Status Ordering Provider     Inpatient consult to Registered Dietitian/Nutritionist  Once        Provider:  (Not yet assigned)    AARON Barraza     Inpatient consult to Hospitalist  Once        Provider:  Odessa Sullivan MD    Completed KEAGAN IRVING     Inpatient consult to Obstetrics / Gynecology  Once        Provider:  Aaron Bernal MD    Acknowledged KEAGAN IRVING          Final Active Diagnoses:    Diagnosis Date Noted POA     PRINCIPAL PROBLEM:  Gestational diabetes mellitus (GDM) in third trimester [O24.419] 01/12/2022 Yes    COVID-19 [U07.1] 01/12/2022 Yes    Hyponatremia [E87.1] 01/12/2022 Yes      Problems Resolved During this Admission:        Significant Diagnostic Studies: Labs: All labs within the past 24 hours have been reviewed  Lab Results   Component Value Date    GROUPTRH O POS 08/08/2021             Immunizations     None          This patient has no babies on file.  Pending Diagnostic Studies:     None          Discharged Condition: good    Disposition: Home or Self Care    Follow Up:   Follow-up Information     Gerald Bernal MD In 1 week.    Specialty: Obstetrics and Gynecology  Why: On Thursday, bring glucose log book  Contact information:  [address] [facility] [address]  [phone]             [facility] Maternal Fetal Medicine ([facility] In 1 week.    Specialty: Maternal and Fetal Medicine  Why: On Tuesday as scheduled, bring glucose log book  Contact information:  [address]  [facility] [address] [phone]  Additional information:  Maternal Fetal Medicine - [facility] ([facility]) 4th floor   Please park in [place] and use [place] elevators                     Patient Instructions:      Diet Adult Regular     Diet diabetic     Activity as tolerated     Medications:  Current Discharge Medication List      CONTINUE these medications which have NOT CHANGED    Details   prenatal no122/iron/folic acid (PRENATAL MULTI ORAL) Take 1 tablet by mouth once daily.      alcohol swabs PadM APPLY AS DIRECTED BEFORE TESTING BLOOD SUGAR  Qty: 100 each, Refills: 0      blood sugar diagnostic (TRUE METRIX GLUCOSE TEST STRIP) Strp TEST 4 TIMES DAILY AS DIRECTED  Qty: 100 each, Refills: 3      blood-glucose meter (TRUE METRIX GLUCOSE METER) Misc USE AS DIRECTED TO TEST BLOOD SUGAR  Qty: 1 each, Refills: 0      insulin aspart U-100 (NOVOLOG) 100 unit/mL (3 mL)  "InPn pen Inject 4 units prior to breakfast, 2 units prior to lunch, and 2 units prior to dinner.  Qty: 15 mL, Refills: 0      insulin NPH (HUMULIN N NPH U-100 INSULIN) 100 unit/mL injection Inject 16 units in the morning  Qty: 10 mL, Refills: 0    Comments: 10 ml vial OK per MD 1/13/2022      insulin syringe-needle U-100 0.5 mL 29 gauge x 1/2" Syrg To be used with NPH insulin every AM  Qty: 100 each, Refills: 0      lancets (TRUEPLUS LANCETS) 30 gauge Misc TEST BLOOD SUGAR 4 TIMES DAILY  Qty: 100 each, Refills: 3      pen needle, diabetic 31 gauge x 3/16" Ndle To be used with insulin pen 3 times daily  Qty: 100 each, Refills: 0         STOP taking these medications       albuterol (ACCUNEB) 0.63 mg/3 mL Nebu Comments:   Reason for Stopping:         albuterol (PROVENTIL/VENTOLIN HFA) 90 mcg/actuation inhaler Comments:   Reason for Stopping:         ferrous sulfate (FEOSOL) 325 mg (65 mg iron) Tab tablet Comments:   Reason for Stopping:         fluticasone propionate (FLONASE) 50 mcg/actuation nasal spray Comments:   Reason for Stopping:         levonorgestrel-ethinyl estradiol (AVIANE,ALESSE,LESSINA) 0.1-20 mg-mcg per tablet Comments:   Reason for Stopping:         metoclopramide HCl (REGLAN) 10 MG tablet Comments:   Reason for Stopping:         ondansetron (ZOFRAN-ODT) 4 MG TbDL Comments:   Reason for Stopping:               Gerald Bernal MD  [facility]  "

## 2022-01-13 NOTE — PROGRESS NOTES
Etiology unclear.  Multifactorial etiology suspected.  Likely contributors to etiology (checked)    [] Pulmonary airway disease    []  Pulmonary parenchymal disease ( sarcoidosis)  [] Pulmonary vascular disease   [] Pleural disease  [] Pulmonary vasculitis  [] Hypoventilation ( chest wall deformity, neuromuscular disease, obesity etc)  [] Anemia  [] Thyroid disease.  [] Cardiac illess  []         Sleep disorder  [x]         Body Habitus (Obesity)    CRLD ROS: no significant ongoing wheezing.   New concerns: None.   Exam: appears well, vitals normal, no respiratory distress, acyanotic, normal RR.   Assessment:  CRLD stable.   Plan: Current treatment plan is effective, no change in therapy.   [facility]  Obstetrics  Antepartum Progress Note    Patient Name: Allison Shaikh  MRN: [id number]  Admission Date: 2022  Hospital Length of Stay: 1 days  Attending Physician: Aaron Bernal MD  Primary Care Provider: Primary Doctor No    Subjective:     Principal Problem:Gestational diabetes mellitus (GDM) in third trimester    HPI:  No notes on file    Hospital Course:  26-year-old  2 para 0010 at 29 weeks gestational age admitted through the emergency room secondary to elevated glucose levels.  Patient states having frequent urination at home used relatives glue, or which registered high.  On admission through the emergency room glucose level was 453. Patient had not yet done 2nd set of labs including gestational diabetes screening test.      Obstetric HPI:  Patient reports no obstetric complaints, states active fetal movement.  Patient has related to nurses dislike of current diet.  Explained to patient in detail need to comply with diabetic diet secondary to extreme elevation in glucose levels.  Patient states understanding.     Objective:     Vital Signs (Most Recent):  Temp: 98 °F (36.7 °C) (22 2310)  Pulse: 62 (22 0342)  Resp: 16 (22 1548)  BP: 131/84 (22 0342)  SpO2: 100 % (22) Vital Signs (24h Range):  Temp:  [98 °F (36.7 °C)-98.4 °F (36.9 °C)] 98 °F (36.7 °C)  Pulse:  [57-74] 62  Resp:  [16] 16  BP: (129-142)/(82-97) 131/84     Weight: 92.1 kg (203 lb)  Body mass index is 31.79 kg/m².    FHT:  Baseline 140s and reassuring  TOCO: No contraction pattern    No intake or output data in the 24 hours ending 22 0725    Cervical Exam:  Deferred     Significant Labs:  Recent Lab Results       22  2305   22  2101   22  1855   22  1436   22  1147        POC Glucose 155   206   201   242   171                        22  0800        POC Glucose 178             Physical Exam   General-no apparent distress, resting  comfortably in bed  Abdomen/uterus nontender  Extremities no calf tenderness    Assessment/Plan:     26 y.o. female  at 29w5d for:    * Gestational diabetes mellitus (GDM) in third trimester  IUP at 29 weeks gestational age-obstetrically stable    Gestational diabetes-previously undiagnosed, uncontrolled on admission.  Hemoglobin A1c= 13.2 on admission.   Will increase patient's NPH 16 units this morning, aspart increased to 8 units this morning, 4 units before lunch, 4 units before dinner.    Nutrition consult happened yesterday, will write order for glucometer    COVID-19 positive-asymptomatic    Will start 24 hour urine    Will order ultrasound    Explained to patient that on discharge will need to continue glucose checks with log book, be compliant with insulin and diet, and will need close follow-up with Maternal Fetal Medicine          Gerald Bernal MD  Obstetrics  [facility]

## 2022-01-14 ENCOUNTER — LAB VISIT (OUTPATIENT)
Dept: LAB | Facility: HOSPITAL | Age: 27
End: 2022-01-14
Attending: SPECIALIST
Payer: MEDICAID

## 2022-01-14 DIAGNOSIS — I10 HYPERTENSION: Primary | ICD-10-CM

## 2022-01-14 LAB
PROT 24H UR-MRATE: 150 MG/SPEC (ref 6–100)
PROT UR-MCNC: 15 MG/DL (ref 0–15)
URINE COLLECTION DURATION: 24 HR
URINE VOLUME: 1000 ML

## 2022-01-14 PROCEDURE — 84156 ASSAY OF PROTEIN URINE: CPT | Performed by: SPECIALIST

## 2022-01-14 NOTE — PLAN OF CARE
Chart and discharge orders reviewed.  Patient discharged home with no further case management needs.     01/13/22 3443   Final Note   Assessment Type Final Discharge Note   Anticipated Discharge Disposition Home   Post-Acute Status   Discharge Delays None known at this time

## 2022-01-14 NOTE — DISCHARGE INSTRUCTIONS
Insulin Orders:  NPH Insulin (glass bottle)  16 Units every morning    Aspart (Pen)  4 Units every morning  2 Units before eating lunch  2 Units before eating dinner    If blood sugars less than 60, eat something with sugar  If blood sugars greater than 350, please call labor and delivery    24 hour urine collection  Place specimen container in toilet and void in container every time you go to the restroom. Poor urine from specimen container into large orange container. Keep orange container on ice. Void one last time at 1225 on Friday January 14 and poor into container. This will be the last time you have to urine is specimen container. Please bring orange container in red bag and bring to registration at the hospital.    Please call labor and delivery at [phone] or Dr. Rosales office with any questions or concerns.     Keep your scheduled appointment with your provider.    Call your Doctor if you have any of the following:  Temperature above 100 degrees  Nausea, vomiting and/or diarrhea  Severe headache, dizziness, or blurred vision  Notable increase in swelling of hands or feet  Notable swelling of face and lips  Difficulty, pain or burning with urination  Foul smelling vaginal discharge  Decreased fetal movement    Come to the hospital if you have any of the following symptoms:  Your water breaks  More than 4-6 contractions in 1 hour for 2 or more hours  Vaginal bleeding like a period    After 28 weeks, you should feel 10 distinct fetal movements within a 2 hour period.    It is recommended that you drink 1/2 a gallon of water each day.  Tea, Soda and Juice are  in addition to this.

## 2022-01-14 NOTE — NURSING
Discharge instructions provided to pt, including insulin regimen, provider follow up, return precautions, and diabetes management. Pt verbalized understanding of all instructions.

## 2022-01-14 NOTE — PLAN OF CARE
Problem:  Fall Injury Risk  Goal: Absence of Fall, Infant Drop and Related Injury  Outcome: Met     Problem: Adult Inpatient Plan of Care  Goal: Plan of Care Review  Outcome: Met  Goal: Patient-Specific Goal (Individualized)  Outcome: Met  Goal: Absence of Hospital-Acquired Illness or Injury  Outcome: Met  Goal: Optimal Comfort and Wellbeing  Outcome: Met  Goal: Readiness for Transition of Care  Outcome: Met     Problem: Hyperglycemia  Goal: Blood Glucose Level Within Targeted Range  Outcome: Met     Problem: Diabetes in Pregnancy  Goal: Blood Glucose Level Within Targeted Range  Outcome: Met

## 2022-01-18 ENCOUNTER — OFFICE VISIT (OUTPATIENT)
Dept: MATERNAL FETAL MEDICINE | Facility: CLINIC | Age: 27
End: 2022-01-18
Payer: MEDICAID

## 2022-01-18 ENCOUNTER — HOSPITAL ENCOUNTER (INPATIENT)
Facility: OTHER | Age: 27
LOS: 7 days | Discharge: HOME | End: 2022-01-25
Attending: OBSTETRICS & GYNECOLOGY | Admitting: OBSTETRICS & GYNECOLOGY
Payer: MEDICAID

## 2022-01-18 ENCOUNTER — ANESTHESIA (OUTPATIENT)
Dept: OBSTETRICS AND GYNECOLOGY | Facility: OTHER | Age: 27
End: 2022-01-18
Payer: MEDICAID

## 2022-01-18 ENCOUNTER — ANESTHESIA EVENT (OUTPATIENT)
Dept: OBSTETRICS AND GYNECOLOGY | Facility: OTHER | Age: 27
End: 2022-01-18
Payer: MEDICAID

## 2022-01-18 ENCOUNTER — PROCEDURE VISIT (OUTPATIENT)
Dept: MATERNAL FETAL MEDICINE | Facility: CLINIC | Age: 27
End: 2022-01-18
Payer: MEDICAID

## 2022-01-18 VITALS
BODY MASS INDEX: 31.66 KG/M2 | HEIGHT: 67 IN | WEIGHT: 201.75 LBS | DIASTOLIC BLOOD PRESSURE: 105 MMHG | SYSTOLIC BLOOD PRESSURE: 160 MMHG

## 2022-01-18 DIAGNOSIS — O24.113 PREGNANCY WITH TYPE 2 DIABETES MELLITUS IN THIRD TRIMESTER: ICD-10-CM

## 2022-01-18 DIAGNOSIS — O14.13 SEVERE PRE-ECLAMPSIA IN THIRD TRIMESTER: ICD-10-CM

## 2022-01-18 DIAGNOSIS — Z36.89 ENCOUNTER FOR FETAL ANATOMIC SURVEY: ICD-10-CM

## 2022-01-18 DIAGNOSIS — R73.9 HYPERGLYCEMIA: ICD-10-CM

## 2022-01-18 DIAGNOSIS — Z3A.29 29 WEEKS GESTATION OF PREGNANCY: ICD-10-CM

## 2022-01-18 DIAGNOSIS — Z98.891 S/P PRIMARY LOW TRANSVERSE C-SECTION: Primary | ICD-10-CM

## 2022-01-18 DIAGNOSIS — O16.3 ELEVATED BLOOD PRESSURE AFFECTING PREGNANCY IN THIRD TRIMESTER, ANTEPARTUM: ICD-10-CM

## 2022-01-18 PROBLEM — O99.013 ANEMIA DURING PREGNANCY IN THIRD TRIMESTER: Status: ACTIVE | Noted: 2022-01-18

## 2022-01-18 LAB
ALBUMIN SERPL BCP-MCNC: 2.5 G/DL (ref 3.5–5.2)
ALP SERPL-CCNC: 152 U/L (ref 55–135)
ALT SERPL W/O P-5'-P-CCNC: 15 U/L (ref 10–44)
ANION GAP SERPL CALC-SCNC: 10 MMOL/L (ref 8–16)
AST SERPL-CCNC: 27 U/L (ref 10–40)
BASOPHILS # BLD AUTO: 0.03 K/UL (ref 0–0.2)
BASOPHILS NFR BLD: 0.5 % (ref 0–1.9)
BILIRUB SERPL-MCNC: 0.2 MG/DL (ref 0.1–1)
BUN SERPL-MCNC: 9 MG/DL (ref 6–20)
CALCIUM SERPL-MCNC: 8.1 MG/DL (ref 8.7–10.5)
CHLORIDE SERPL-SCNC: 108 MMOL/L (ref 95–110)
CO2 SERPL-SCNC: 16 MMOL/L (ref 23–29)
CREAT SERPL-MCNC: 0.6 MG/DL (ref 0.5–1.4)
CREAT SERPL-MCNC: 0.7 MG/DL (ref 0.5–1.4)
CREAT UR-MCNC: 96.5 MG/DL (ref 15–325)
DIFFERENTIAL METHOD: ABNORMAL
EOSINOPHIL # BLD AUTO: 0 K/UL (ref 0–0.5)
EOSINOPHIL NFR BLD: 0.3 % (ref 0–8)
ERYTHROCYTE [DISTWIDTH] IN BLOOD BY AUTOMATED COUNT: 19.3 % (ref 11.5–14.5)
EST. GFR  (AFRICAN AMERICAN): >60 ML/MIN/1.73 M^2
EST. GFR  (AFRICAN AMERICAN): >60 ML/MIN/1.73 M^2
EST. GFR  (NON AFRICAN AMERICAN): >60 ML/MIN/1.73 M^2
EST. GFR  (NON AFRICAN AMERICAN): >60 ML/MIN/1.73 M^2
GLUCOSE SERPL-MCNC: 160 MG/DL (ref 70–110)
HCT VFR BLD AUTO: 30.9 % (ref 37–48.5)
HGB BLD-MCNC: 8.7 G/DL (ref 12–16)
IMM GRANULOCYTES # BLD AUTO: 0.03 K/UL (ref 0–0.04)
IMM GRANULOCYTES NFR BLD AUTO: 0.5 % (ref 0–0.5)
LYMPHOCYTES # BLD AUTO: 1.8 K/UL (ref 1–4.8)
LYMPHOCYTES NFR BLD: 27.5 % (ref 18–48)
MCH RBC QN AUTO: 17.4 PG (ref 27–31)
MCHC RBC AUTO-ENTMCNC: 28.2 G/DL (ref 32–36)
MCV RBC AUTO: 62 FL (ref 82–98)
MONOCYTES # BLD AUTO: 0.5 K/UL (ref 0.3–1)
MONOCYTES NFR BLD: 8.3 % (ref 4–15)
NEUTROPHILS # BLD AUTO: 4.1 K/UL (ref 1.8–7.7)
NEUTROPHILS NFR BLD: 62.9 % (ref 38–73)
NRBC BLD-RTO: 1 /100 WBC
PLATELET # BLD AUTO: 244 K/UL (ref 150–450)
PMV BLD AUTO: ABNORMAL FL (ref 9.2–12.9)
POCT GLUCOSE: 171 MG/DL (ref 70–110)
POCT GLUCOSE: 248 MG/DL (ref 70–110)
POTASSIUM SERPL-SCNC: 3.9 MMOL/L (ref 3.5–5.1)
PROT SERPL-MCNC: 6.7 G/DL (ref 6–8.4)
PROT UR-MCNC: 87 MG/DL (ref 0–15)
PROT/CREAT UR: 0.9 MG/G{CREAT} (ref 0–0.2)
RBC # BLD AUTO: 5.01 M/UL (ref 4–5.4)
SODIUM SERPL-SCNC: 134 MMOL/L (ref 136–145)
WBC # BLD AUTO: 6.52 K/UL (ref 3.9–12.7)

## 2022-01-18 PROCEDURE — 99285 EMERGENCY DEPT VISIT HI MDM: CPT | Mod: 25,27

## 2022-01-18 PROCEDURE — 3077F SYST BP >= 140 MM HG: CPT | Mod: CPTII,,, | Performed by: OBSTETRICS & GYNECOLOGY

## 2022-01-18 PROCEDURE — 11000001 HC ACUTE MED/SURG PRIVATE ROOM

## 2022-01-18 PROCEDURE — 99213 OFFICE O/P EST LOW 20 MIN: CPT | Mod: PBBFAC,TH,25 | Performed by: OBSTETRICS & GYNECOLOGY

## 2022-01-18 PROCEDURE — 76811 OB US DETAILED SNGL FETUS: CPT | Mod: PBBFAC | Performed by: OBSTETRICS & GYNECOLOGY

## 2022-01-18 PROCEDURE — 96374 THER/PROPH/DIAG INJ IV PUSH: CPT

## 2022-01-18 PROCEDURE — 99999 PR PBB SHADOW E&M-EST. PATIENT-LVL III: CPT | Mod: PBBFAC,,, | Performed by: OBSTETRICS & GYNECOLOGY

## 2022-01-18 PROCEDURE — 99285 EMERGENCY DEPT VISIT HI MDM: CPT | Mod: 25,,, | Performed by: OBSTETRICS & GYNECOLOGY

## 2022-01-18 PROCEDURE — 3046F PR MOST RECENT HEMOGLOBIN A1C LEVEL > 9.0%: ICD-10-PCS | Mod: CPTII,,, | Performed by: OBSTETRICS & GYNECOLOGY

## 2022-01-18 PROCEDURE — 25000003 PHARM REV CODE 250: Performed by: STUDENT IN AN ORGANIZED HEALTH CARE EDUCATION/TRAINING PROGRAM

## 2022-01-18 PROCEDURE — 63600175 PHARM REV CODE 636 W HCPCS: Performed by: STUDENT IN AN ORGANIZED HEALTH CARE EDUCATION/TRAINING PROGRAM

## 2022-01-18 PROCEDURE — 85025 COMPLETE CBC W/AUTO DIFF WBC: CPT | Performed by: STUDENT IN AN ORGANIZED HEALTH CARE EDUCATION/TRAINING PROGRAM

## 2022-01-18 PROCEDURE — 3080F PR MOST RECENT DIASTOLIC BLOOD PRESSURE >= 90 MM HG: ICD-10-PCS | Mod: CPTII,,, | Performed by: OBSTETRICS & GYNECOLOGY

## 2022-01-18 PROCEDURE — 3077F PR MOST RECENT SYSTOLIC BLOOD PRESSURE >= 140 MM HG: ICD-10-PCS | Mod: CPTII,,, | Performed by: OBSTETRICS & GYNECOLOGY

## 2022-01-18 PROCEDURE — 76811 PR US, OB FETAL EVAL & EXAM, TRANSABDOM,FIRST GESTATION: ICD-10-PCS | Mod: 26,S$PBB,, | Performed by: OBSTETRICS & GYNECOLOGY

## 2022-01-18 PROCEDURE — 99285 PR EMERGENCY DEPT VISIT,LEVEL V: ICD-10-PCS | Mod: 25,,, | Performed by: OBSTETRICS & GYNECOLOGY

## 2022-01-18 PROCEDURE — 59025 FETAL NON-STRESS TEST: CPT | Mod: 26,77,, | Performed by: OBSTETRICS & GYNECOLOGY

## 2022-01-18 PROCEDURE — 1159F MED LIST DOCD IN RCRD: CPT | Mod: CPTII,,, | Performed by: OBSTETRICS & GYNECOLOGY

## 2022-01-18 PROCEDURE — 1111F PR DISCHARGE MEDS RECONCILED W/ CURRENT OUTPATIENT MED LIST: ICD-10-PCS | Mod: CPTII,,, | Performed by: OBSTETRICS & GYNECOLOGY

## 2022-01-18 PROCEDURE — 99999 PR PBB SHADOW E&M-EST. PATIENT-LVL III: ICD-10-PCS | Mod: PBBFAC,,, | Performed by: OBSTETRICS & GYNECOLOGY

## 2022-01-18 PROCEDURE — 59025 PR FETAL 2N-STRESS TEST: ICD-10-PCS | Mod: 26,77,, | Performed by: OBSTETRICS & GYNECOLOGY

## 2022-01-18 PROCEDURE — 84156 ASSAY OF PROTEIN URINE: CPT | Performed by: STUDENT IN AN ORGANIZED HEALTH CARE EDUCATION/TRAINING PROGRAM

## 2022-01-18 PROCEDURE — 80053 COMPREHEN METABOLIC PANEL: CPT | Performed by: STUDENT IN AN ORGANIZED HEALTH CARE EDUCATION/TRAINING PROGRAM

## 2022-01-18 PROCEDURE — 1159F PR MEDICATION LIST DOCUMENTED IN MEDICAL RECORD: ICD-10-PCS | Mod: CPTII,,, | Performed by: OBSTETRICS & GYNECOLOGY

## 2022-01-18 PROCEDURE — 1111F DSCHRG MED/CURRENT MED MERGE: CPT | Mod: CPTII,,, | Performed by: OBSTETRICS & GYNECOLOGY

## 2022-01-18 PROCEDURE — 82565 ASSAY OF CREATININE: CPT | Performed by: STUDENT IN AN ORGANIZED HEALTH CARE EDUCATION/TRAINING PROGRAM

## 2022-01-18 PROCEDURE — 99204 PR OFFICE/OUTPT VISIT, NEW, LEVL IV, 45-59 MIN: ICD-10-PCS | Mod: 25,S$PBB,TH, | Performed by: OBSTETRICS & GYNECOLOGY

## 2022-01-18 PROCEDURE — 3080F DIAST BP >= 90 MM HG: CPT | Mod: CPTII,,, | Performed by: OBSTETRICS & GYNECOLOGY

## 2022-01-18 PROCEDURE — 87081 CULTURE SCREEN ONLY: CPT | Performed by: STUDENT IN AN ORGANIZED HEALTH CARE EDUCATION/TRAINING PROGRAM

## 2022-01-18 PROCEDURE — 59025 FETAL NON-STRESS TEST: CPT

## 2022-01-18 PROCEDURE — 3008F BODY MASS INDEX DOCD: CPT | Mod: CPTII,,, | Performed by: OBSTETRICS & GYNECOLOGY

## 2022-01-18 PROCEDURE — 59025 PR FETAL 2N-STRESS TEST: ICD-10-PCS | Mod: 26,,, | Performed by: OBSTETRICS & GYNECOLOGY

## 2022-01-18 PROCEDURE — 3046F HEMOGLOBIN A1C LEVEL >9.0%: CPT | Mod: CPTII,,, | Performed by: OBSTETRICS & GYNECOLOGY

## 2022-01-18 PROCEDURE — 63600175 PHARM REV CODE 636 W HCPCS: Performed by: OBSTETRICS & GYNECOLOGY

## 2022-01-18 PROCEDURE — 82962 GLUCOSE BLOOD TEST: CPT

## 2022-01-18 PROCEDURE — 99223 1ST HOSP IP/OBS HIGH 75: CPT | Mod: 25,,, | Performed by: OBSTETRICS & GYNECOLOGY

## 2022-01-18 PROCEDURE — 36415 COLL VENOUS BLD VENIPUNCTURE: CPT | Performed by: STUDENT IN AN ORGANIZED HEALTH CARE EDUCATION/TRAINING PROGRAM

## 2022-01-18 PROCEDURE — 3008F PR BODY MASS INDEX (BMI) DOCUMENTED: ICD-10-PCS | Mod: CPTII,,, | Performed by: OBSTETRICS & GYNECOLOGY

## 2022-01-18 PROCEDURE — 99223 PR INITIAL HOSPITAL CARE,LEVL III: ICD-10-PCS | Mod: 25,,, | Performed by: OBSTETRICS & GYNECOLOGY

## 2022-01-18 PROCEDURE — 76811 OB US DETAILED SNGL FETUS: CPT | Mod: 26,S$PBB,, | Performed by: OBSTETRICS & GYNECOLOGY

## 2022-01-18 PROCEDURE — 99204 OFFICE O/P NEW MOD 45 MIN: CPT | Mod: 25,S$PBB,TH, | Performed by: OBSTETRICS & GYNECOLOGY

## 2022-01-18 PROCEDURE — 59025 FETAL NON-STRESS TEST: CPT | Mod: 26,,, | Performed by: OBSTETRICS & GYNECOLOGY

## 2022-01-18 RX ORDER — DIPHENHYDRAMINE HYDROCHLORIDE 50 MG/ML
25 INJECTION INTRAMUSCULAR; INTRAVENOUS EVERY 4 HOURS PRN
Status: DISCONTINUED | OUTPATIENT
Start: 2022-01-18 | End: 2022-01-23

## 2022-01-18 RX ORDER — SODIUM CHLORIDE, SODIUM LACTATE, POTASSIUM CHLORIDE, CALCIUM CHLORIDE 600; 310; 30; 20 MG/100ML; MG/100ML; MG/100ML; MG/100ML
INJECTION, SOLUTION INTRAVENOUS CONTINUOUS
Status: DISCONTINUED | OUTPATIENT
Start: 2022-01-18 | End: 2022-01-20 | Stop reason: ALTCHOICE

## 2022-01-18 RX ORDER — ONDANSETRON 8 MG/1
8 TABLET, ORALLY DISINTEGRATING ORAL EVERY 8 HOURS PRN
Status: DISCONTINUED | OUTPATIENT
Start: 2022-01-18 | End: 2022-01-23

## 2022-01-18 RX ORDER — PROCHLORPERAZINE EDISYLATE 5 MG/ML
5 INJECTION INTRAMUSCULAR; INTRAVENOUS EVERY 6 HOURS PRN
Status: DISCONTINUED | OUTPATIENT
Start: 2022-01-18 | End: 2022-01-23

## 2022-01-18 RX ORDER — PRENATAL WITH FERROUS FUM AND FOLIC ACID 3080; 920; 120; 400; 22; 1.84; 3; 20; 10; 1; 12; 200; 27; 25; 2 [IU]/1; [IU]/1; MG/1; [IU]/1; MG/1; MG/1; MG/1; MG/1; MG/1; MG/1; UG/1; MG/1; MG/1; MG/1; MG/1
1 TABLET ORAL DAILY
Status: DISCONTINUED | OUTPATIENT
Start: 2022-01-18 | End: 2022-01-23

## 2022-01-18 RX ORDER — INSULIN ASPART 100 [IU]/ML
1-10 INJECTION, SOLUTION INTRAVENOUS; SUBCUTANEOUS
Status: DISCONTINUED | OUTPATIENT
Start: 2022-01-18 | End: 2022-01-25 | Stop reason: HOSPADM

## 2022-01-18 RX ORDER — DIPHENHYDRAMINE HCL 25 MG
25 CAPSULE ORAL EVERY 4 HOURS PRN
Status: DISCONTINUED | OUTPATIENT
Start: 2022-01-18 | End: 2022-01-23

## 2022-01-18 RX ORDER — CALCIUM GLUCONATE 98 MG/ML
1 INJECTION, SOLUTION INTRAVENOUS
Status: DISCONTINUED | OUTPATIENT
Start: 2022-01-18 | End: 2022-01-22

## 2022-01-18 RX ORDER — IRON POLYSACCHARIDE COMPLEX 150 MG
150 CAPSULE ORAL DAILY
Status: DISCONTINUED | OUTPATIENT
Start: 2022-01-18 | End: 2022-01-25 | Stop reason: HOSPADM

## 2022-01-18 RX ORDER — SODIUM CHLORIDE 0.9 % (FLUSH) 0.9 %
10 SYRINGE (ML) INJECTION
Status: DISCONTINUED | OUTPATIENT
Start: 2022-01-18 | End: 2022-01-23

## 2022-01-18 RX ORDER — HYDRALAZINE HYDROCHLORIDE 20 MG/ML
5 INJECTION INTRAMUSCULAR; INTRAVENOUS ONCE
Status: COMPLETED | OUTPATIENT
Start: 2022-01-18 | End: 2022-01-18

## 2022-01-18 RX ORDER — HYDRALAZINE HYDROCHLORIDE 20 MG/ML
10 INJECTION INTRAMUSCULAR; INTRAVENOUS ONCE
Status: COMPLETED | OUTPATIENT
Start: 2022-01-18 | End: 2022-01-18

## 2022-01-18 RX ORDER — INSULIN ASPART 100 [IU]/ML
2 INJECTION, SOLUTION INTRAVENOUS; SUBCUTANEOUS 2 TIMES DAILY WITH MEALS
Status: DISCONTINUED | OUTPATIENT
Start: 2022-01-18 | End: 2022-01-20

## 2022-01-18 RX ORDER — INSULIN ASPART 100 [IU]/ML
4 INJECTION, SOLUTION INTRAVENOUS; SUBCUTANEOUS
Status: DISCONTINUED | OUTPATIENT
Start: 2022-01-19 | End: 2022-01-21

## 2022-01-18 RX ORDER — PRENATAL WITH FERROUS FUM AND FOLIC ACID 3080; 920; 120; 400; 22; 1.84; 3; 20; 10; 1; 12; 200; 27; 25; 2 [IU]/1; [IU]/1; MG/1; [IU]/1; MG/1; MG/1; MG/1; MG/1; MG/1; MG/1; UG/1; MG/1; MG/1; MG/1; MG/1
1 TABLET ORAL DAILY
Status: DISCONTINUED | OUTPATIENT
Start: 2022-01-18 | End: 2022-01-21

## 2022-01-18 RX ORDER — MAGNESIUM SULFATE HEPTAHYDRATE 40 MG/ML
6 INJECTION, SOLUTION INTRAVENOUS ONCE
Status: COMPLETED | OUTPATIENT
Start: 2022-01-18 | End: 2022-01-18

## 2022-01-18 RX ORDER — ACETAMINOPHEN 325 MG/1
650 TABLET ORAL EVERY 6 HOURS PRN
Status: DISCONTINUED | OUTPATIENT
Start: 2022-01-18 | End: 2022-01-23

## 2022-01-18 RX ORDER — INSULIN ASPART 100 [IU]/ML
1 INJECTION, SOLUTION INTRAVENOUS; SUBCUTANEOUS ONCE
Status: COMPLETED | OUTPATIENT
Start: 2022-01-18 | End: 2022-01-18

## 2022-01-18 RX ORDER — MAGNESIUM SULFATE HEPTAHYDRATE 40 MG/ML
2 INJECTION, SOLUTION INTRAVENOUS CONTINUOUS
Status: DISCONTINUED | OUTPATIENT
Start: 2022-01-18 | End: 2022-01-19

## 2022-01-18 RX ORDER — PROCHLORPERAZINE MALEATE 5 MG
5 TABLET ORAL ONCE
Status: COMPLETED | OUTPATIENT
Start: 2022-01-18 | End: 2022-01-18

## 2022-01-18 RX ORDER — AMOXICILLIN 250 MG
1 CAPSULE ORAL NIGHTLY PRN
Status: DISCONTINUED | OUTPATIENT
Start: 2022-01-18 | End: 2022-01-23

## 2022-01-18 RX ORDER — BETAMETHASONE SODIUM PHOSPHATE AND BETAMETHASONE ACETATE 3; 3 MG/ML; MG/ML
12 INJECTION, SUSPENSION INTRA-ARTICULAR; INTRALESIONAL; INTRAMUSCULAR; SOFT TISSUE EVERY 24 HOURS
Status: COMPLETED | OUTPATIENT
Start: 2022-01-18 | End: 2022-01-19

## 2022-01-18 RX ORDER — SIMETHICONE 80 MG
1 TABLET,CHEWABLE ORAL EVERY 6 HOURS PRN
Status: DISCONTINUED | OUTPATIENT
Start: 2022-01-18 | End: 2022-01-23

## 2022-01-18 RX ADMIN — SODIUM CHLORIDE, SODIUM LACTATE, POTASSIUM CHLORIDE, AND CALCIUM CHLORIDE 1000 ML: .6; .31; .03; .02 INJECTION, SOLUTION INTRAVENOUS at 02:01

## 2022-01-18 RX ADMIN — ACETAMINOPHEN 650 MG: 325 TABLET, FILM COATED ORAL at 05:01

## 2022-01-18 RX ADMIN — MAGNESIUM SULFATE IN WATER 2 G/HR: 40 INJECTION, SOLUTION INTRAVENOUS at 02:01

## 2022-01-18 RX ADMIN — HUMAN INSULIN 6 UNITS: 100 INJECTION, SUSPENSION SUBCUTANEOUS at 09:01

## 2022-01-18 RX ADMIN — PROCHLORPERAZINE MALEATE 5 MG: 5 TABLET ORAL at 09:01

## 2022-01-18 RX ADMIN — BETAMETHASONE SODIUM PHOSPHATE AND BETAMETHASONE ACETATE 12 MG: 3; 3 INJECTION, SUSPENSION INTRA-ARTICULAR; INTRALESIONAL; INTRAMUSCULAR at 01:01

## 2022-01-18 RX ADMIN — DIPHENHYDRAMINE HYDROCHLORIDE 25 MG: 50 INJECTION INTRAMUSCULAR; INTRAVENOUS at 09:01

## 2022-01-18 RX ADMIN — HYDRALAZINE HYDROCHLORIDE 10 MG: 20 INJECTION, SOLUTION INTRAMUSCULAR; INTRAVENOUS at 02:01

## 2022-01-18 RX ADMIN — MAGNESIUM SULFATE HEPTAHYDRATE 6 G: 40 INJECTION, SOLUTION INTRAVENOUS at 02:01

## 2022-01-18 RX ADMIN — INSULIN ASPART 4 UNITS: 100 INJECTION, SOLUTION INTRAVENOUS; SUBCUTANEOUS at 09:01

## 2022-01-18 RX ADMIN — INSULIN ASPART 2 UNITS: 100 INJECTION, SOLUTION INTRAVENOUS; SUBCUTANEOUS at 07:01

## 2022-01-18 RX ADMIN — INSULIN ASPART 1 UNITS: 100 INJECTION, SOLUTION INTRAVENOUS; SUBCUTANEOUS at 02:01

## 2022-01-18 RX ADMIN — HYDRALAZINE HYDROCHLORIDE 5 MG: 20 INJECTION, SOLUTION INTRAMUSCULAR; INTRAVENOUS at 01:01

## 2022-01-18 NOTE — ASSESSMENT & PLAN NOTE
Patient was found to have elevated blood pressures in clinic with a severe range in 160/110s on her repeat.  She currently denies any preeclamptic symptoms.  Given these blood pressures, her visit was cut short and patient will be sent to OB ED for further evaluation.

## 2022-01-18 NOTE — HPI
Allison Shaikh is a 26 y.o. [id number] at 29w3d presents complaining of elevated blood pressure reading in MFM clinic today. Patient noted to have BP 160s/100s in MFM clinic without preexisting pregnancy-associated hypertension diagnosis. She then presented to the [facility] and was found to have sustained severe range BP requiring a dose of IV hydralazine to bring BP to mild range. She denies headaches, scotoma, chest pain, SOB, RUQ pain.   This IUP is complicated by T2DM, obesity, anemia.  Patient denies contractions, denies vaginal bleeding, denies LOF.   Fetal Movement: normal.

## 2022-01-18 NOTE — ASSESSMENT & PLAN NOTE
-  on arrival per CMP, 1u aspart ordered   - Home regimen: NPH 16u daily, Aspart 4/2/2  - SSI ordered  - POCT BG Q4 hr while NPO; transition to Fasting and 2 hr postprandial when given diet   - Goal BG <150

## 2022-01-18 NOTE — PROGRESS NOTES
"MATERNAL-FETAL MEDICINE   CONSULT NOTE    Provider requesting consultation: Dolores    SUBJECTIVE:     Ms. Allison Shaikh is a 26 y.o.  female with IUP at 30w3d who is seen in consultation by MFM for evaluation and management of:  Problem   Elevated Blood Pressure Affecting Pregnancy in Third Trimester, Antepartum   Pregnancy With Type 2 Diabetes Mellitus in Third Trimester        Patient has no complaints today. She is overall feeling well.  Patient denies any contractions/cramping, vaginal bleeding or leakage of fluid.  She reports good fetal movement.    Patient did not bring her sugars in today.   Reports compliance on her current insulin regimen  Patient denies any Pre-E symptoms currently.    Was recentl;y diagnosed with COVID on          Medication List with Changes/Refills   Current Medications    ALCOHOL SWABS PADM    APPLY AS DIRECTED BEFORE TESTING BLOOD SUGAR    BLOOD SUGAR DIAGNOSTIC (TRUE METRIX GLUCOSE TEST STRIP) STRP    TEST 4 TIMES DAILY AS DIRECTED    BLOOD-GLUCOSE METER (TRUE METRIX GLUCOSE METER) MISC    USE AS DIRECTED TO TEST BLOOD SUGAR    INSULIN ASPART U-100 (NOVOLOG) 100 UNIT/ML (3 ML) INPN PEN    Inject 4 units prior to breakfast, 2 units prior to lunch, and 2 units prior to dinner.    INSULIN NPH (HUMULIN N NPH U-100 INSULIN) 100 UNIT/ML INJECTION    Inject 16 units in the morning    INSULIN SYRINGE-NEEDLE U-100 0.5 ML 29 GAUGE X 1/2" SYRG    To be used with NPH insulin every AM    LANCETS (TRUEPLUS LANCETS) 30 GAUGE MISC    TEST BLOOD SUGAR 4 TIMES DAILY    PEN NEEDLE, DIABETIC 31 GAUGE X 3/16" NDLE    To be used with insulin pen 3 times daily    PRENATAL /IRON/FOLIC ACID (PRENATAL MULTI ORAL)    Take 1 tablet by mouth once daily.       Review of patient's allergies indicates:  No Known Allergies    PMH:  Past Medical History:   Diagnosis Date    Anxiety     Asthma     Eczema        PObHx:  OB History    Para Term  AB Living   2 0 0 0 1 0   SAB IAB " "Ectopic Multiple Live Births   1 0 0 0 0      # Outcome Date GA Lbr Mariusz/2nd Weight Sex Delivery Anes PTL Lv   2 Current            1 SAB 21 4w0d    SAB          PSH:History reviewed. No pertinent surgical history.    Family history:family history includes Ovarian cancer in her paternal grandmother.    Social history: reports that she has never smoked. She has never used smokeless tobacco. She reports that she does not drink alcohol and does not use drugs.    Objective:   BP (!) 160/105   Ht 5' 7" (1.702 m)   Wt 91.5 kg (201 lb 11.5 oz)   LMP 2021   BMI 31.59 kg/m²     Physical Exam  Deferred    Ultrasound performed. See viewpoint for full ultrasound report.  A detailed fetal anatomic ultrasound examination was performed today due to the following high risk indication: Diabetes.  No fetal structural abnormalities are identified today.  Fetal size is appropriate for established gestational age.   Placental location is normal without evidence of previa.   Various placental lakes are seen, one on the lateral edge of the placenta.    Significant labs/imaging:  A1c - 13.2    AST: 28  ALT 15  24 hr protein 150    ASSESSMENT/PLAN:     26 y.o.  female with IUP at 30w3d     Pregnancy with type 2 diabetes mellitus in third trimester  Pregestational Diabetes  The patient was counseled regarding the risks of diabetes in pregnancy. These risks include but are not limited to an increased risk of , preeclampsia/gestational hypertension, fetal structural anomalies, macrosomia, prematurity, shoulder dystocia/birth injury,  hyperbilirubinemia and electrolyte issues, pulmonary immaturity and sudden stillbirth especially in those patients with poor glucose control. I also discussed with the patient the need for increased fetal surveillance with serial fetal growth assessments and third trimester fetal testing. I also reviewed the possibility of need for early delivery in those with poor glucose " control or evidence of fetal growth abnormalities.  Patient did not bring her sugars in today.  She was recently hospitalized from - after she presented with polyuria and being found to have blood sugar between 400 in 500s.  Patient was started on insulin at that time (see below for regimen she is currently on).  She was also told to start checking her sugars.  However, she did not bring her sugar log today.  She reports her fasting values in the 120s, lunch 60s, dinner 130s.  She is not taking her post breakfast sugars.  Although she did not bring her sugar log today, I anticipate she does require nighttime NPH.  As such I will be adding this on today (please see below).  She has not seen diabetic Education nor dietitian.  Given her poor A1c control I did counseled about the increased risk of anomalies and heart defects.    Recommendations (Please refer to [facility] guidelines):  Baseline evaluation (to be ordered by primary OB provider):   24 hour urine protein or urine P/C ratio, CBC, CMP    Maternal EKG; echocardiogram if BMI > 30 or EKG is abnormal   Maternal ophthalmic exam (if hasn't been performed in last year) and podiatry exam   TSH (if type 1 DM)   Hemoglobin A1C every trimester   Diabetic education referral and counseling re: goal blood sugars (< 95 mg/dl for fasting, < 120 mg/dl for 2 hour postprandial)  Medications:    Too late to start ASA at this time   Start 1 mg folic acid daily   Insulin see below  Ultrasounds with [facility]:   Serial growth ultrasounds q 4-6 weeks    A fetal echocardiogram is recommended with pediatric cardiology - this will be scheduled.    Initiate  surveillance at 32 weeks (Should be ordered and arranged by the patient's primary OB provider).   Twice weekly  fetal surveillance is recommended with BPP alternating with NST.     Delivery timing:   38 0/7 to 38 and 6/7 weeks if under good control without comorbidities   37 0/7 to 37 and 67 weeks  if longstanding diabetes or poorly controlled, polyhydramnios, EFW>90th percentile, or BMI >= 40   36 0/7 to 37 and 6/7 weeks if vascular complications, prior stillbirth or other complicating conditions.  Recommend consideration of earlier delivery if IUGR, HTN, or other complications  Recommend offering  for delivery is EFW is 4500g or more near the time of delivery    Insulin management during labor and delivery  -Give usual dose of intermediate acting (NPH) or long-acting insulin at bedtime  -Hold morning dose of insulin or reduce to ½ dose   -Patients who require insulin should be scheduled as the first available (7 am or 7:30 am)  given the need for NPO status  -Check glucose every 2-4 hours in latent labor; hourly in active labor  -If blood glucose is less than 70 mg/dl, change IVF to D5 ½ NS at rate of 100-150 cc/hr and monitor blood glucose hourly   -IV infusion of regular insulin is recommended if glucose levels exceed 120 mg/dl    Postpartum management  -Insulin should be reduced by 1/2 of the pre-delivery dose within the first 6-24 hours following delivery.  -Patients who are breastfeeding should be advised to have small snacks before breastfeeding to reduce the risk of hypoglycemia.  -Patients should be referred to primary MD or Endocrinology for postpartum management.    Multiple dose injectable insulin  Patient's current regimen is:  NPH 16 in AM , Aspart 4/2/2     After consultation, the following adjustments were made:  NPH 16/6  Aspart 4/2/2    The patient was advised to call for blood sugars less than 70 or greater than 200 prior to her next appointment. She was also advised that if she notes nausea/vomiting, inability to tolerate PO, or concerns about being able to take her insulin that she should contact her provider or present to the OB ED.  Strict hypoglycemic precautions were reviewed, including symptoms that may be associated to being hypoglycemic. Steps reviewed if  hypoglycemia occurs: this included having available/calling for help, taking her sugars Q15 minutes while using chocolate/candy/juice if surgars are low, redosing Q15 minutes if needed, calling 911 if persistent hypoglycemia or LOC.        Elevated blood pressure affecting pregnancy in third trimester, antepartum  Patient was found to have elevated blood pressures in clinic with a severe range in 160/110s on her repeat.  She currently denies any preeclamptic symptoms.  Given these blood pressures, her visit was cut short and patient will be sent to OB ED for further evaluation.      **PLEASE NOTE - the MARIYA for this patient was adjusted to reflect an ultrasound previously performed at 6w1d (per CRL) on 8/8/20. Her new MARIYA is 4/2/22. If this is not correct, please reach out to MFM so that we may adjust.**      Patient was counseled that prenatal ultrasound studies have limitations. They do not detect all fetal, genetic, placental, and maternal abnormalities. A normal appearing prenatal ultrasound is reassuring. However, it does not guarantee the absence of an abnormality or predict a normal outcome for the fetus or the mother.     Patient's other comorbidites were not addressed in today's visit.  If primary OB provider would like MFM input on these, please feel free to reconsult as clinically indicated.  Otherwise, will defer management to primary OB provider      FOLLOW UP:   A follow up ultrasound was made for 3 weeks from today. A follow up MFM MD visit was made for 1 week from today for BS check    The patient was given an opportunity to ask questions about the management of her high risk pregnancy problems. She expressed an understanding of and agreement to the above impression and plan. All questions were answered to her satisfaction.    45 minutes of total time spent on the encounter, which includes face to face time and non-face to face time preparing to see the patient (eg, review of tests), obtaining and/or  reviewing separately obtained history, documenting clinical information in the electronic or other health record, independently interpreting results (not separately reported) and communicating results to the patient/family/caregiver, or care coordination (not separately reported).        Dago Barker MD   Maternal-Fetal Medicine      Electronically Signed by Dago Barker January 18, 2022

## 2022-01-18 NOTE — ED PROVIDER NOTES
Encounter Date: 2022       History     Chief Complaint   Patient presents with    Hypertension     Allison Shaikh is a 26 y.o. [id number] at 29w3d presents complaining of elevated blood pressures. Denies headaches, scotoma, chest pain, SOB, RUQ pain.     This IUP is complicated by T2DM, obesity, recent COVID infection.  Patient denies contractions, denies vaginal bleeding, denies LOF.   Fetal Movement: normal.     HPI  Review of patient's allergies indicates:  No Known Allergies  Past Medical History:   Diagnosis Date    Anxiety     Asthma     Eczema      No past surgical history on file.  Family History   Problem Relation Age of Onset    Ovarian cancer Paternal Grandmother     Breast cancer Neg Hx     Cancer Neg Hx     Colon cancer Neg Hx     Diabetes Neg Hx     Eclampsia Neg Hx     Hypertension Neg Hx     Miscarriages / Stillbirths Neg Hx      labor Neg Hx     Stroke Neg Hx      Social History     Tobacco Use    Smoking status: Never Smoker    Smokeless tobacco: Never Used   Substance Use Topics    Alcohol use: No    Drug use: No     Review of Systems   Constitutional: Negative for fever.   HENT: Negative for congestion.    Eyes: Negative for visual disturbance.   Respiratory: Negative for shortness of breath.    Cardiovascular: Negative for chest pain.   Gastrointestinal: Negative for abdominal pain.   Genitourinary: Negative for vaginal bleeding.   Neurological: Negative for headaches.       Physical Exam     Initial Vitals   BP Pulse Resp Temp SpO2   22 1239 22 1239 22 1239 22 1239 22 1253   (!) 155/107 100 18 98.4 °F (36.9 °C) 100 %      MAP       --                Physical Exam    Nursing note and vitals reviewed.  Constitutional: She appears well-developed and well-nourished.   HENT:   Head: Normocephalic and atraumatic.   Eyes: Conjunctivae and EOM are normal. Pupils are equal, round, and reactive to light.   Neck: Neck supple.   Normal range of  motion.  Cardiovascular: Normal rate.   Pulmonary/Chest: No respiratory distress.   Musculoskeletal:         General: Normal range of motion.      Cervical back: Normal range of motion and neck supple.     Neurological: She is alert and oriented to person, place, and time. She has normal strength and normal reflexes.   Skin: Skin is warm and dry.   Psychiatric: She has a normal mood and affect. Her behavior is normal. Judgment and thought content normal.         ED Course   Fetal non-stress test    Date/Time: 1/18/2022 2:48 PM  Performed by: Annie Love MD  Authorized by: Annie Love MD     Nonstress Test:     Variability:  6-25 BPM    Decelerations:  None    Accelerations:  15 bpm    Baseline:  140    Contractions:  Not present  Biophysical Profile:     Nonstress Test Interpretation: reactive      Overall Impression:  Reassuring  Post-procedure:     Patient tolerance:  Patient tolerated the procedure well with no immediate complications      Labs Reviewed   COMPREHENSIVE METABOLIC PANEL - Abnormal; Notable for the following components:       Result Value    Sodium 134 (*)     CO2 16 (*)     Glucose 160 (*)     Calcium 8.1 (*)     Albumin 2.5 (*)     Alkaline Phosphatase 152 (*)     All other components within normal limits   CBC W/ AUTO DIFFERENTIAL - Abnormal; Notable for the following components:    Hemoglobin 8.7 (*)     Hematocrit 30.9 (*)     MCV 62 (*)     MCH 17.4 (*)     MCHC 28.2 (*)     RDW 19.3 (*)     nRBC 1 (*)     All other components within normal limits   PROTEIN / CREATININE RATIO, URINE - Abnormal; Notable for the following components:    Protein, Urine Random 87 (*)     Prot/Creat Ratio, Urine 0.90 (*)     All other components within normal limits    Narrative:     Specimen Source->Urine   POCT GLUCOSE - Abnormal; Notable for the following components:    POCT Glucose 171 (*)     All other components within normal limits   STREP B SCREEN, VAGINAL / RECTAL   POCT GLUCOSE MONITORING  CONTINUOUS   POCT GLUCOSE MONITORING CONTINUOUS   POCT GLUCOSE MONITORING CONTINUOUS          Imaging Results    None          Medications   sodium chloride 0.9% flush 10 mL (has no administration in time range)   acetaminophen tablet 650 mg (has no administration in time range)   ondansetron disintegrating tablet 8 mg (has no administration in time range)   prochlorperazine injection Soln 5 mg (has no administration in time range)   senna-docusate 8.6-50 mg per tablet 1 tablet (has no administration in time range)   simethicone chewable tablet 80 mg (has no administration in time range)   diphenhydrAMINE capsule 25 mg (has no administration in time range)   diphenhydrAMINE injection 25 mg (has no administration in time range)   prenatal vitamin oral tablet (has no administration in time range)   betamethasone acetate-betamethasone sodium phosphate injection 12 mg (12 mg Intramuscular Given 1/18/22 1351)   magnesium sulfate in water 40 gram/1,000 mL (4 %) infusion (2 g/hr Intravenous New Bag 1/18/22 1445)   calcium gluconate 100 mg/mL (10%) injection 1 g (has no administration in time range)   lactated ringers infusion (1,000 mLs Intravenous New Bag 1/18/22 1424)   insulin aspart U-100 pen 4 Units (has no administration in time range)   insulin aspart U-100 pen 2 Units (has no administration in time range)   insulin NPH injection 16 Units (has no administration in time range)   insulin aspart U-100 pen 1-10 Units (has no administration in time range)   iron polysaccharides capsule 150 mg (has no administration in time range)   prenatal vitamin oral tablet (has no administration in time range)   hydrALAZINE injection 5 mg (5 mg Intravenous Given 1/18/22 1332)   magnesium sulfate in water 40 gram/1,000 mL (4 %) bolus from bag 6 g (6 g Intravenous Bolus from Bag 1/18/22 1425)   insulin aspart U-100 pen 1 Units (1 Units Subcutaneous Given 1/18/22 1442)   hydrALAZINE injection 10 mg (10 mg Intravenous Given 1/18/22 1433)      Medical Decision Making:   ED Management:  Sustained severe range BP on arrival requiring hydral 5/10   MgSO4 initiated   CBC and CMP wnl, PC 0.9   MFM team notified, admission orders placed   Consents signed and placed in chart   See H&P for details   Other:   I have discussed this case with another health care provider.            Attending Attestation:   Physician Attestation Statement for Resident:  As the supervising MD   Physician Attestation Statement: I have personally seen and examined this patient.   I agree with the above history. -:   As the supervising MD I agree with the above PE.    As the supervising MD I agree with the above treatment, course, plan, and disposition.   -:   NST  I independently reviewed the fetal non-stress test with the following interpretation:  140 BPM baseline  Variability: moderate  Accelerations: present  Decelerations: absent  Contractions: none  Category 1    Clinical Interpretation:age appropriate    Patient evaluated and found to be stable, agree with resident's assessment and plan.  Asymptomatic.  Severe range BP treated twice with IV hydrlazine with response. Plan for [facility] to [facility] for management of pre eclampsia with SF in patient.    I was personally present during the critical portions of the procedure(s) performed by the resident and was immediately available in the ED to provide services and assistance as needed during the entire procedure.  I have reviewed and agree with the residents interpretation of the following: lab data.                         Clinical Impression:   Final diagnoses:  [O14.13] Severe pre-eclampsia in third trimester          ED Disposition Condition    Admit               Edith Hood MD  01/18/22 0413

## 2022-01-18 NOTE — HOSPITAL COURSE
01/18/2022 - Admit to L&D for PreE w/ SF (BP). MgSO4 initiated, BMZ 1/2 administered. S/p hydral 5/10 in [facility].   01/19/2022 - NAEON, blood pressure normal to mild range. Fetal monitoring reassuring. One episode of headache resolved with compazine and patient asymptomatic this morning.  01/20/2022 - BP well controlled on Procardia 30 XL and patient remains asymptomatic. Elevated blood glucose requiring sliding scale. Two fetal decels noted, however monitoring reassuring for two hours afterwards.   01/21/2022 - BP well controlled overnight, asymptomatic. Post-dinner , did not receive SSI due to communication error. Fasting  this AM. NST cat 1, reassuring.   01/22/2022 - Normal to mild range BP overnight. Postprandial lunch 201. Insulin regimen increased yesterday to NPH 20/14 and aspart 8/4/8.

## 2022-01-18 NOTE — SUBJECTIVE & OBJECTIVE
OB History    Para Term  AB Living   2 0 0 0 1 0   SAB IAB Ectopic Multiple Live Births   1 0 0 0 0      # Outcome Date GA Lbr Mariusz/2nd Weight Sex Delivery Anes PTL Lv   2 Current            1 SAB 21 4w0d    SAB        Past Medical History:   Diagnosis Date    Anxiety     Asthma     Eczema      No past surgical history on file.    (Not in a hospital admission)      Review of patient's allergies indicates:  No Known Allergies     Family History     Problem Relation (Age of Onset)    Ovarian cancer Paternal Grandmother        Tobacco Use    Smoking status: Never Smoker    Smokeless tobacco: Never Used   Substance and Sexual Activity    Alcohol use: No    Drug use: No    Sexual activity: Yes     Partners: Male     Birth control/protection: Condom     Review of Systems   Constitutional: Negative for chills and fever.   Respiratory: Negative for cough and shortness of breath.    Cardiovascular: Negative for chest pain.   Gastrointestinal: Negative for abdominal pain, nausea and vomiting.   Endocrine: Positive for diabetes.   Genitourinary: Negative for vaginal bleeding.   Musculoskeletal: Negative for back pain.   Neurological: Negative for headaches.   Psychiatric/Behavioral: Negative for depression.      Objective:     Vital Signs (Most Recent):  Temp: 98.4 °F (36.9 °C) (22 1239)  Pulse: 95 (22 1355)  Resp: 17 (22 1324)  BP: (!) 156/99 (dr chavez aware) (22 1345)  SpO2: 100 % (22 1355) Vital Signs (24h Range):  Temp:  [98.4 °F (36.9 °C)] 98.4 °F (36.9 °C)  Pulse:  [] 95  Resp:  [16-18] 17  SpO2:  [100 %] 100 %  BP: (149-172)/() 156/99        There is no height or weight on file to calculate BMI.    FHT: Baseline 140, moderate BTBV, + accels, no decels, cat 1, reassuring  TOCO: None     Physical Exam:   Constitutional: She is oriented to person, place, and time. She appears well-developed and well-nourished.    HENT:   Head: Normocephalic and  atraumatic.    Eyes: EOM are normal.     Cardiovascular: Normal rate.     Pulmonary/Chest: Effort normal.        Abdominal: Soft. There is no abdominal tenderness.             Musculoskeletal: Normal range of motion.       Neurological: She is alert and oriented to person, place, and time.    Skin: Skin is warm and dry.    Psychiatric: She has a normal mood and affect. Her behavior is normal.       Cervix: deferred      Significant Labs:  Lab Results   Component Value Date    GROUPTRH O POS 08/08/2021       CBC:   Recent Labs   Lab 01/18/22  1300   WBC 6.52   RBC 5.01   HGB 8.7*   HCT 30.9*      MCV 62*   MCH 17.4*   MCHC 28.2*     CMP:   Recent Labs   Lab 01/18/22  1300   *   CALCIUM 8.1*   ALBUMIN 2.5*   PROT 6.7   *   K 3.9   CO2 16*      BUN 9   CREATININE 0.7   ALKPHOS 152*   ALT 15   AST 27   BILITOT 0.2     PC: 0.9    I have personallly reviewed all pertinent lab results from the last 24 hours.

## 2022-01-18 NOTE — H&P
[facility] Emergency Dept (OB)  Obstetrics  History & Physical    Patient Name: Allison Shaikh  MRN: [id number]  Admission Date: 2022  Primary Care Provider: Gerald Bernal MD    Subjective:     Principal Problem:Severe pre-eclampsia in third trimester    History of Present Illness:  Allison Shaikh is a 26 y.o. [id number] at 29w3d presents complaining of elevated blood pressure reading in [facility] clinic today. Patient noted to have BP 160s/100s in MFM clinic without preexisting pregnancy-associated hypertension diagnosis. She then presented to the [facility] and was found to have sustained severe range BP requiring a dose of IV hydralazine to bring BP to mild range. She denies headaches, scotoma, chest pain, SOB, RUQ pain.   This IUP is complicated by T2DM, obesity, anemia.  Patient denies contractions, denies vaginal bleeding, denies LOF.   Fetal Movement: normal.           OB History    Para Term  AB Living   2 0 0 0 1 0   SAB IAB Ectopic Multiple Live Births   1 0 0 0 0      # Outcome Date GA Lbr Mariusz/2nd Weight Sex Delivery Anes PTL Lv   2 Current            1 SAB 21 4w0d    SAB        Past Medical History:   Diagnosis Date    Anxiety     Asthma     Eczema      No past surgical history on file.    (Not in a hospital admission)      Review of patient's allergies indicates:  No Known Allergies     Family History     Problem Relation (Age of Onset)    Ovarian cancer Paternal Grandmother        Tobacco Use    Smoking status: Never Smoker    Smokeless tobacco: Never Used   Substance and Sexual Activity    Alcohol use: No    Drug use: No    Sexual activity: Yes     Partners: Male     Birth control/protection: Condom     Review of Systems   Constitutional: Negative for chills and fever.   Respiratory: Negative for cough and shortness of breath.    Cardiovascular: Negative for chest pain.   Gastrointestinal: Negative for abdominal pain, nausea and vomiting.   Endocrine: Positive for diabetes.    Genitourinary: Negative for vaginal bleeding.   Musculoskeletal: Negative for back pain.   Neurological: Negative for headaches.   Psychiatric/Behavioral: Negative for depression.      Objective:     Vital Signs (Most Recent):  Temp: 98.4 °F (36.9 °C) (22 1239)  Pulse: 95 (22 1355)  Resp: 17 (22 1324)  BP: (!) 156/99 (dr chavez aware) (22 1345)  SpO2: 100 % (22 1355) Vital Signs (24h Range):  Temp:  [98.4 °F (36.9 °C)] 98.4 °F (36.9 °C)  Pulse:  [] 95  Resp:  [16-18] 17  SpO2:  [100 %] 100 %  BP: (149-172)/() 156/99        There is no height or weight on file to calculate BMI.    FHT: Baseline 140, moderate BTBV, + accels, no decels, cat 1, reassuring  TOCO: None     Physical Exam:   Constitutional: She is oriented to person, place, and time. She appears well-developed and well-nourished.    HENT:   Head: Normocephalic and atraumatic.    Eyes: EOM are normal.     Cardiovascular: Normal rate.     Pulmonary/Chest: Effort normal.        Abdominal: Soft. There is no abdominal tenderness.             Musculoskeletal: Normal range of motion.       Neurological: She is alert and oriented to person, place, and time.    Skin: Skin is warm and dry.    Psychiatric: She has a normal mood and affect. Her behavior is normal.       Cervix: deferred      Significant Labs:  Lab Results   Component Value Date    GROUPTRH O POS 2021       CBC:   Recent Labs   Lab 22  1300   WBC 6.52   RBC 5.01   HGB 8.7*   HCT 30.9*      MCV 62*   MCH 17.4*   MCHC 28.2*     CMP:   Recent Labs   Lab 22  1300   *   CALCIUM 8.1*   ALBUMIN 2.5*   PROT 6.7   *   K 3.9   CO2 16*      BUN 9   CREATININE 0.7   ALKPHOS 152*   ALT 15   AST 27   BILITOT 0.2     PC: 0.9    I have personallly reviewed all pertinent lab results from the last 24 hours.    Assessment/Plan:     26 y.o. female  at 29w3d for:    * Severe pre-eclampsia in third trimester  - Sustained severe  range BP on arrival    - s/p hydral 5mg IV in [facility]   - MgSO4 initiated for seizure ppx and fetal neuroprotection   - Can consider initiation of PO antihypertensive prior to 32 wga   - CBC 10/36/260  - CMP w/ Cr 1.0, AST/ALT 17/24    - Repeat Cr ordered for 6 hours from MgSO4 initiation due to slightly elevated baseline Cr of 1.0   - PC 0.90   - BMZ 1/2 administered in OBD   - Consents signed and placed in chart       Anemia during pregnancy in third trimester  - H/H 10/36   - PO iron supplement and PNV ordered  - Asymptomatic     Pregnancy with type 2 diabetes mellitus in third trimester  -  on arrival per CMP, 1u aspart ordered   - Home regimen: NPH 16u daily, Aspart 4/2/2  - SSI ordered  - POCT BG Q4 hr while NPO; transition to Fasting and 2 hr postprandial when given diet   - Goal BG <150         Annie Love MD  Obstetrics  [facility] - Emergency Dept (OB)

## 2022-01-18 NOTE — ASSESSMENT & PLAN NOTE
Pregestational Diabetes  The patient was counseled regarding the risks of diabetes in pregnancy. These risks include but are not limited to an increased risk of , preeclampsia/gestational hypertension, fetal structural anomalies, macrosomia, prematurity, shoulder dystocia/birth injury,  hyperbilirubinemia and electrolyte issues, pulmonary immaturity and sudden stillbirth especially in those patients with poor glucose control. I also discussed with the patient the need for increased fetal surveillance with serial fetal growth assessments and third trimester fetal testing. I also reviewed the possibility of need for early delivery in those with poor glucose control or evidence of fetal growth abnormalities.  Patient did not bring her sugars in today.  She was recently hospitalized from - after she presented with polyuria and being found to have blood sugar between 400 in 500s.  Patient was started on insulin at that time (see below for regimen she is currently on).  She was also told to start checking her sugars.  However, she did not bring her sugar log today.  She reports her fasting values in the 120s, lunch 60s, dinner 130s.  She is not taking her post breakfast sugars.  Although she did not bring her sugar log today, I anticipate she does require nighttime NPH.  As such I will be adding this on today (please see below).  She has not seen diabetic Education nor dietitian.  Given her poor A1c control I did counseled about the increased risk of anomalies and heart defects.    Recommendations (Please refer to [facility] guidelines):  Baseline evaluation (to be ordered by primary OB provider):   24 hour urine protein or urine P/C ratio, CBC, CMP    Maternal EKG; echocardiogram if BMI > 30 or EKG is abnormal   Maternal ophthalmic exam (if hasn't been performed in last year) and podiatry exam   TSH (if type 1 DM)   Hemoglobin A1C every trimester   Diabetic education referral and counseling  re: goal blood sugars (< 95 mg/dl for fasting, < 120 mg/dl for 2 hour postprandial)  Medications:    Too late to start ASA at this time   Start 1 mg folic acid daily   Insulin see below  Ultrasounds with MFM:   Serial growth ultrasounds q 4-6 weeks    A fetal echocardiogram is recommended with pediatric cardiology - this will be scheduled.    Initiate  surveillance at 32 weeks (Should be ordered and arranged by the patient's primary OB provider).   Twice weekly  fetal surveillance is recommended with BPP alternating with NST.     Delivery timing:   38 0/7 to 38 and 6/7 weeks if under good control without comorbidities   37 0/7 to 37 and 67 weeks if longstanding diabetes or poorly controlled, polyhydramnios, EFW>90th percentile, or BMI >= 40   36 0/7 to 37 and 6/7 weeks if vascular complications, prior stillbirth or other complicating conditions.  Recommend consideration of earlier delivery if IUGR, HTN, or other complications  Recommend offering  for delivery is EFW is 4500g or more near the time of delivery    Insulin management during labor and delivery  -Give usual dose of intermediate acting (NPH) or long-acting insulin at bedtime  -Hold morning dose of insulin or reduce to ½ dose   -Patients who require insulin should be scheduled as the first available (7 am or 7:30 am)  given the need for NPO status  -Check glucose every 2-4 hours in latent labor; hourly in active labor  -If blood glucose is less than 70 mg/dl, change IVF to D5 ½ NS at rate of 100-150 cc/hr and monitor blood glucose hourly   -IV infusion of regular insulin is recommended if glucose levels exceed 120 mg/dl    Postpartum management  -Insulin should be reduced by 1/2 of the pre-delivery dose within the first 6-24 hours following delivery.  -Patients who are breastfeeding should be advised to have small snacks before breastfeeding to reduce the risk of hypoglycemia.  -Patients should be referred to  primary MD or Endocrinology for postpartum management.    Multiple dose injectable insulin  Patient's current regimen is:  NPH 16 in AM , Aspart 4/2/2     After consultation, the following adjustments were made:  NPH 16/6  Aspart 4/2/2    The patient was advised to call for blood sugars less than 70 or greater than 200 prior to her next appointment. She was also advised that if she notes nausea/vomiting, inability to tolerate PO, or concerns about being able to take her insulin that she should contact her provider or present to the OB ED.  Strict hypoglycemic precautions were reviewed, including symptoms that may be associated to being hypoglycemic. Steps reviewed if hypoglycemia occurs: this included having available/calling for help, taking her sugars Q15 minutes while using chocolate/candy/juice if surgars are low, redosing Q15 minutes if needed, calling 911 if persistent hypoglycemia or LOC.

## 2022-01-18 NOTE — ASSESSMENT & PLAN NOTE
- Sustained severe range BP on arrival    - s/p hydral 5mg IV in [facility]   - MgSO4 initiated for seizure ppx and fetal neuroprotection   - Can consider initiation of PO antihypertensive prior to 32 wga   - CBC 10/36/260  - CMP w/ Cr 1.0, AST/ALT 17/24    - Repeat Cr ordered for 6 hours from MgSO4 initiation due to slightly elevated baseline Cr of 1.0   - PC 0.90   - BMZ 1/2 administered in OBD   - Consents signed and placed in chart

## 2022-01-19 PROBLEM — Z3A.29 29 WEEKS GESTATION OF PREGNANCY: Status: ACTIVE | Noted: 2022-01-19

## 2022-01-19 LAB
POCT GLUCOSE: 150 MG/DL (ref 70–110)
POCT GLUCOSE: 175 MG/DL (ref 70–110)
POCT GLUCOSE: 197 MG/DL (ref 70–110)
POCT GLUCOSE: 212 MG/DL (ref 70–110)
POCT GLUCOSE: 229 MG/DL (ref 70–110)
POCT GLUCOSE: 245 MG/DL (ref 70–110)
POCT GLUCOSE: 250 MG/DL (ref 70–110)
POCT GLUCOSE: 270 MG/DL (ref 70–110)

## 2022-01-19 PROCEDURE — 99233 SBSQ HOSP IP/OBS HIGH 50: CPT | Mod: 25,,, | Performed by: OBSTETRICS & GYNECOLOGY

## 2022-01-19 PROCEDURE — 59025 FETAL NON-STRESS TEST: CPT | Mod: 26,,, | Performed by: OBSTETRICS & GYNECOLOGY

## 2022-01-19 PROCEDURE — 63600175 PHARM REV CODE 636 W HCPCS: Performed by: STUDENT IN AN ORGANIZED HEALTH CARE EDUCATION/TRAINING PROGRAM

## 2022-01-19 PROCEDURE — 25000003 PHARM REV CODE 250: Performed by: STUDENT IN AN ORGANIZED HEALTH CARE EDUCATION/TRAINING PROGRAM

## 2022-01-19 PROCEDURE — 59025 PR FETAL 2N-STRESS TEST: ICD-10-PCS | Mod: 26,,, | Performed by: OBSTETRICS & GYNECOLOGY

## 2022-01-19 PROCEDURE — 11000001 HC ACUTE MED/SURG PRIVATE ROOM

## 2022-01-19 PROCEDURE — 27000207 HC ISOLATION

## 2022-01-19 PROCEDURE — 99233 PR SUBSEQUENT HOSPITAL CARE,LEVL III: ICD-10-PCS | Mod: 25,,, | Performed by: OBSTETRICS & GYNECOLOGY

## 2022-01-19 RX ORDER — NIFEDIPINE 30 MG/1
30 TABLET, EXTENDED RELEASE ORAL DAILY
Status: DISCONTINUED | OUTPATIENT
Start: 2022-01-19 | End: 2022-01-23

## 2022-01-19 RX ORDER — LABETALOL 100 MG/1
200 TABLET, FILM COATED ORAL EVERY 12 HOURS
Status: DISCONTINUED | OUTPATIENT
Start: 2022-01-19 | End: 2022-01-19

## 2022-01-19 RX ADMIN — SODIUM CHLORIDE, SODIUM LACTATE, POTASSIUM CHLORIDE, AND CALCIUM CHLORIDE 1000 ML: .6; .31; .03; .02 INJECTION, SOLUTION INTRAVENOUS at 08:01

## 2022-01-19 RX ADMIN — NIFEDIPINE 30 MG: 30 TABLET, FILM COATED, EXTENDED RELEASE ORAL at 08:01

## 2022-01-19 RX ADMIN — PRENATAL VIT W/ FE FUMARATE-FA TAB 27-0.8 MG 1 TABLET: 27-0.8 TAB at 08:01

## 2022-01-19 RX ADMIN — INSULIN ASPART 4 UNITS: 100 INJECTION, SOLUTION INTRAVENOUS; SUBCUTANEOUS at 01:01

## 2022-01-19 RX ADMIN — Medication 150 MG: at 08:01

## 2022-01-19 RX ADMIN — INSULIN ASPART 2 UNITS: 100 INJECTION, SOLUTION INTRAVENOUS; SUBCUTANEOUS at 07:01

## 2022-01-19 RX ADMIN — INSULIN ASPART 4 UNITS: 100 INJECTION, SOLUTION INTRAVENOUS; SUBCUTANEOUS at 10:01

## 2022-01-19 RX ADMIN — HUMAN INSULIN 6 UNITS: 100 INJECTION, SUSPENSION SUBCUTANEOUS at 10:01

## 2022-01-19 RX ADMIN — BETAMETHASONE SODIUM PHOSPHATE AND BETAMETHASONE ACETATE 12 MG: 3; 3 INJECTION, SUSPENSION INTRA-ARTICULAR; INTRALESIONAL; INTRAMUSCULAR at 02:01

## 2022-01-19 RX ADMIN — INSULIN ASPART 1 UNITS: 100 INJECTION, SOLUTION INTRAVENOUS; SUBCUTANEOUS at 09:01

## 2022-01-19 RX ADMIN — INSULIN ASPART 2 UNITS: 100 INJECTION, SOLUTION INTRAVENOUS; SUBCUTANEOUS at 10:01

## 2022-01-19 RX ADMIN — INSULIN ASPART 3 UNITS: 100 INJECTION, SOLUTION INTRAVENOUS; SUBCUTANEOUS at 01:01

## 2022-01-19 RX ADMIN — INSULIN ASPART 2 UNITS: 100 INJECTION, SOLUTION INTRAVENOUS; SUBCUTANEOUS at 02:01

## 2022-01-19 RX ADMIN — INSULIN ASPART 5 UNITS: 100 INJECTION, SOLUTION INTRAVENOUS; SUBCUTANEOUS at 05:01

## 2022-01-19 RX ADMIN — INSULIN ASPART 2 UNITS: 100 INJECTION, SOLUTION INTRAVENOUS; SUBCUTANEOUS at 05:01

## 2022-01-19 NOTE — ASSESSMENT & PLAN NOTE
- BMZ course administered 1/18-19  - Consents signed and placed in chart   - Daily prenatal vitamin  - NST BID  - GBS collected on admit  - Tdap to be administered  - Vertex on bedside ultrasound  - Growth scan on 1/18: EFW 1,224g  24%, normal ROGELIO

## 2022-01-19 NOTE — ANESTHESIA PREPROCEDURE EVALUATION
[facility]  Anesthesia Pre-Operative Evaluation         Patient Name: Allison Shaikh  YOB: 1995  MRN: [id number]    2022      Allison Shaikh is a 26 y.o. female  @ 29w3d who presents secondary to elevated BP at [facility] clinic. This IUP is complicated by gDM on insulin, obesity, controlled asthma, and anemia. Patient denies cardiopulmonary disease, bleeding disorders, and spine pathology. Of note, patient tested COVID + on 22 and has been asymptomatic since.     OB History    Para Term  AB Living   2 0 0 0 1 0   SAB IAB Ectopic Multiple Live Births   1 0 0 0 0      # Outcome Date GA Lbr Mariusz/2nd Weight Sex Delivery Anes PTL Lv   2 Current            1 SAB 21 4w0d    SAB          Review of patient's allergies indicates:  No Known Allergies    Wt Readings from Last 1 Encounters:   22 1058 91.5 kg (201 lb 11.5 oz)       BP Readings from Last 3 Encounters:   22 (!) 146/91   22 (!) 160/105   22 (!) 142/96       Patient Active Problem List   Diagnosis    Pregnancy with type 2 diabetes mellitus in third trimester    COVID-19    Hyponatremia    Elevated blood pressure affecting pregnancy in third trimester, antepartum    Severe pre-eclampsia in third trimester    Anemia during pregnancy in third trimester       No past surgical history on file.    Social History     Socioeconomic History    Marital status: Single   Tobacco Use    Smoking status: Never Smoker    Smokeless tobacco: Never Used   Substance and Sexual Activity    Alcohol use: No    Drug use: No    Sexual activity: Yes     Partners: Male     Birth control/protection: Condom         Chemistry        Component Value Date/Time     (L) 2022 1300    K 3.9 2022 1300     2022 1300    CO2 16 (L) 2022 1300    BUN 9 2022 1300    CREATININE 0.7 2022 1300     (H) 2022 1300        Component Value Date/Time     CALCIUM 8.1 (L) 01/18/2022 1300    ALKPHOS 152 (H) 01/18/2022 1300    AST 27 01/18/2022 1300    ALT 15 01/18/2022 1300    BILITOT 0.2 01/18/2022 1300    ESTGFRAFRICA >60 01/18/2022 1300    EGFRNONAA >60 01/18/2022 1300            Lab Results   Component Value Date    WBC 6.52 01/18/2022    HGB 8.7 (L) 01/18/2022    HCT 30.9 (L) 01/18/2022    MCV 62 (L) 01/18/2022     01/18/2022       No results for input(s): PT, INR, PROTIME, APTT in the last 72 hours.        Anesthesia Evaluation    I have reviewed the Patient Summary Reports.    I have reviewed the Nursing Notes. I have reviewed the NPO Status.   I have reviewed the Medications.     Review of Systems  Anesthesia Hx:  No problems with previous Anesthesia  Denies Family Hx of Anesthesia complications.   Denies Personal Hx of Anesthesia complications.   Hematology/Oncology:  Hematology Normal   Oncology Normal     EENT/Dental:EENT/Dental Normal   Cardiovascular:  Cardiovascular Normal     Pulmonary:   Asthma    Renal/:  Renal/ Normal     Hepatic/GI:  Hepatic/GI Normal    Musculoskeletal:  Musculoskeletal Normal    Neurological:  Neurology Normal    Endocrine:   Diabetes, type 1    Dermatological:  Skin Normal    Psych:  Psychiatric Normal           Physical Exam  General:  Well nourished    Airway/Jaw/Neck:  Airway Findings: Mouth Opening: Normal Tongue: Normal  General Airway Assessment: Adult  Mallampati: II  TM Distance: Normal, at least 6 cm        Eyes/Ears/Nose:  EYES/EARS/NOSE FINDINGS: Normal   Dental:  DENTAL FINDINGS: Normal   Chest/Lungs:  Chest/Lungs Clear    Heart/Vascular:  Heart Findings: Normal Heart murmur: negative       Mental Status:  Mental Status Findings: Normal        Anesthesia Plan  Type of Anesthesia, risks & benefits discussed:  Anesthesia Type:  general, spinal, epidural, CSE    Patient's Preference:   Plan Factors:          Intra-op Monitoring Plan: standard ASA monitors  Intra-op Monitoring Plan Comments:   Post Op Pain  Control Plan: per primary service following discharge from PACU  Post Op Pain Control Plan Comments:     Induction:   IV  Beta Blocker:  Patient is not currently on a Beta-Blocker (No further documentation required).       Informed Consent: Patient understands risks and agrees with Anesthesia plan.  Questions answered. Anesthesia consent signed with patient.  ASA Score: 2     Day of Surgery Review of History & Physical: I have interviewed and examined the patient. I have reviewed the patient's H&P dated:    H&P update referred to the provider.         Ready For Surgery From Anesthesia Perspective.

## 2022-01-19 NOTE — ASSESSMENT & PLAN NOTE
-  on arrival per CMP, 1u aspart ordered   - Home regimen: NPH 16u daily, Aspart 4/2/2, however per Dr. Barker's recommendation at clinic visit with add NPH 6 units nightly:   NPH 16/6   Aspart 4/2/2  - SSI ordered  - POCT Fasting and 2 hr postprandial when given diet   - Goal BG: fasting <95 and 2hr postprandial <120  - Diabetic diet ordered

## 2022-01-19 NOTE — ASSESSMENT & PLAN NOTE
- Sustained severe range BP on arrival    - s/p hydral 5/10mg IV in [facility]   - MgSO4 initiated for seizure ppx and fetal neuroprotection, will discontinue this morning as blood pressures have been well controlled  - Will start Procardia 30 XL  - PreE labs checked on admission:   - Cr 0.6   - AST/ALT 15/27   - Plt 244   - P:C 0.9   - Will repeat labs q72h  - Plan for inpatient care until delivery at 34 weeks gestation unless indicated earlier

## 2022-01-19 NOTE — SUBJECTIVE & OBJECTIVE
Obstetric HPI:  Patient reports no contractions, active fetal movement, absent vaginal bleeding , absent loss of fluid. Denies headache, changes in vision, chest pain, SOB, RUQ pain, or new onset edema.      Objective:     Vital Signs (Most Recent):  Temp: 98.2 °F (36.8 °C) (01/19/22 0742)  Pulse: 90 (01/19/22 0942)  Resp: 17 (01/19/22 0742)  BP: 135/80 (01/19/22 0942)  SpO2: 100 % (01/19/22 0742) Vital Signs (24h Range):  Temp:  [97.9 °F (36.6 °C)-98.4 °F (36.9 °C)] 98.2 °F (36.8 °C)  Pulse:  [] 90  Resp:  [15-20] 17  SpO2:  [99 %-100 %] 100 %  BP: (127-180)/() 135/80     Weight: 91.5 kg (201 lb 11.5 oz)  Body mass index is 31.59 kg/m².    FHT: Cat 1 (reassuring) baseline 125, moderate variability, +acels, -decels  TOCO:  Quiet      Intake/Output Summary (Last 24 hours) at 1/19/2022 1408  Last data filed at 1/19/2022 1030  Gross per 24 hour   Intake 2438.33 ml   Output 4900 ml   Net -2461.67 ml       Cervical Exam: deferred     Significant Labs:  Recent Lab Results       01/19/22  1306   01/19/22  0858   01/19/22  0533   01/19/22  0132   01/18/22  2124        Creatinine               eGFR if                eGFR if non                POCT Glucose 229   175   197   212   248                        01/18/22  1931   01/18/22  1600        Creatinine 0.6         eGFR if  >60         eGFR if non  >60  Comment: Calculation used to obtain the estimated glomerular filtration  rate (eGFR) is the CKD-EPI equation.            POCT Glucose   150             Physical Exam:   Constitutional: She is oriented to person, place, and time. She appears well-developed and well-nourished.    HENT:   Head: Normocephalic and atraumatic.    Eyes: EOM are normal.     Cardiovascular: Normal rate.     Pulmonary/Chest: Effort normal.        Abdominal: Soft. There is no abdominal tenderness.             Musculoskeletal: Normal range of motion.       Neurological: She is  alert and oriented to person, place, and time.    Skin: Skin is warm and dry.    Psychiatric: She has a normal mood and affect. Her behavior is normal.

## 2022-01-19 NOTE — PROGRESS NOTES
[facility] - Antepartum ([place])  Obstetrics  Antepartum Progress Note    Patient Name: Alilson Shaikh  MRN: [id number]  Admission Date: 2022  Hospital Length of Stay: 1 days  Attending Physician: No att. providers found  Primary Care Provider: Gerald Bernal MD    Subjective:     Principal Problem:Severe pre-eclampsia in third trimester    HPI:  Allison Shaikh is a 26 y.o. [id number] at 29w3d presents complaining of elevated blood pressure reading in [facility] clinic today. Patient noted to have BP 160s/100s in MFM clinic without preexisting pregnancy-associated hypertension diagnosis. She then presented to the [facility] and was found to have sustained severe range BP requiring a dose of IV hydralazine to bring BP to mild range. She denies headaches, scotoma, chest pain, SOB, RUQ pain.   This IUP is complicated by T2DM, obesity, anemia.  Patient denies contractions, denies vaginal bleeding, denies LOF.   Fetal Movement: normal.         Hospital Course:  2022 - Admit to L&D for PreE w/ SF (BP). MgSO4 initiated, BMZ 1/2 administered. S/p hydral 5/10 in [facility].   2022 - NAEON, blood pressure normal to mild range. Fetal monitoring reassuring. One episode of headache resolved with compazine and patient asymptomatic this morning.      Obstetric HPI:  Patient reports no contractions, active fetal movement, absent vaginal bleeding , absent loss of fluid. Denies headache, changes in vision, chest pain, SOB, RUQ pain, or new onset edema.      Objective:     Vital Signs (Most Recent):  Temp: 98.2 °F (36.8 °C) (22 07)  Pulse: 90 (22 0942)  Resp: 17 (22)  BP: 135/80 (22 0942)  SpO2: 100 % (22) Vital Signs (24h Range):  Temp:  [97.9 °F (36.6 °C)-98.4 °F (36.9 °C)] 98.2 °F (36.8 °C)  Pulse:  [] 90  Resp:  [15-20] 17  SpO2:  [99 %-100 %] 100 %  BP: (127-180)/() 135/80     Weight: 91.5 kg (201 lb 11.5 oz)  Body mass index is 31.59 kg/m².    FHT: Cat 1 (reassuring) baseline  125, moderate variability, +acels, -decels  TOCO:  Quiet      Intake/Output Summary (Last 24 hours) at 2022 1408  Last data filed at 2022 1030  Gross per 24 hour   Intake 2438.33 ml   Output 4900 ml   Net -2461.67 ml       Cervical Exam: deferred     Significant Labs:  Recent Lab Results       22  1306   22  0858   22  0533   22  0132   22  2124        Creatinine               eGFR if                eGFR if non                POCT Glucose 229   175   197   212   248                        22  1931   22  1600        Creatinine 0.6         eGFR if  >60         eGFR if non  >60  Comment: Calculation used to obtain the estimated glomerular filtration  rate (eGFR) is the CKD-EPI equation.            POCT Glucose   150             Physical Exam:   Constitutional: She is oriented to person, place, and time. She appears well-developed and well-nourished.    HENT:   Head: Normocephalic and atraumatic.    Eyes: EOM are normal.     Cardiovascular: Normal rate.     Pulmonary/Chest: Effort normal.        Abdominal: Soft. There is no abdominal tenderness.             Musculoskeletal: Normal range of motion.       Neurological: She is alert and oriented to person, place, and time.    Skin: Skin is warm and dry.    Psychiatric: She has a normal mood and affect. Her behavior is normal.       Assessment/Plan:     26 y.o. female  at 29w4d for:    * Severe pre-eclampsia in third trimester  - Sustained severe range BP on arrival    - s/p hydral 5/10mg IV in [facility]   - MgSO4 initiated for seizure ppx and fetal neuroprotection, will discontinue this morning as blood pressures have been well controlled  - Will start Procardia 30 XL  - PreE labs checked on admission:   - Cr 0.6   - AST/ALT 15/27   - Plt 244   - P:C 0.9   - Will repeat labs q72h  - Plan for inpatient care until delivery at 34 weeks gestation unless  indicated earlier      29 weeks gestation of pregnancy  - BMZ course administered 1/18-19  - Consents signed and placed in chart   - Daily prenatal vitamin  - NST BID  - GBS collected on admit  - Tdap to be administered  - Vertex on bedside ultrasound  - Growth scan on 1/18: EFW 1,224g  24%, normal ROGELIO      Anemia during pregnancy in third trimester  - H/H 8.7/30.9 on admit  - PO iron supplement and PNV ordered  - Asymptomatic     COVID-19  - Diagnosed on 1/11/22 however patient states her symptoms start on 1/03  - Asymptomatic     Pregnancy with type 2 diabetes mellitus in third trimester  -  on arrival per CMP, 1u aspart ordered   - Home regimen: NPH 16u daily, Aspart 4/2/2, however per Dr. Barker's recommendation at clinic visit with add NPH 6 units nightly:   NPH 16/6   Aspart 4/2/2  - SSI ordered  - POCT Fasting and 2 hr postprandial when given diet   - Goal BG: fasting <95 and 2hr postprandial <120  - Diabetic diet ordered          Amado Tariq MD  Obstetrics  [facility] - Antepartum ([place])        Patient seen and examined. Agree with resident assessment and plan.  Feels well, no complaints. Denies preE symptoms. Reports adequate fetal movement.    Temp:  [97.9 °F (36.6 °C)-98.4 °F (36.9 °C)] 98.2 °F (36.8 °C)  Pulse:  [] 78  Resp:  [15-20] 17  SpO2:  [99 %-100 %] 100 %  BP: (115-180)/(74-99) 115/77    Abdomen soft, no fundal tenderness    EFM reviewed, overall reassuring fetal status  Baseline 120bpm/mod fernando/+accels/-decels  Monitoring appropriate for gestational age  No contraction pattern noted    Labs reviewed  BGs reviewed    Continue inpatient management of pre-eclampsia with severe features  No evidence of worsening preE  Labs q72 hours  Procardia 30 XL initiated today  Can d/c MgSO4   NST BID    Uptitrate insulin regimen as needed  Monitor BGs    Diabetic diet    Anticipate delivery at 34 weeks or sooner as indicated    Sherron Box MD  Maternal Fetal Medicine fellow  PGY-7         spouse

## 2022-01-20 LAB
ABO + RH BLD: NORMAL
ALBUMIN SERPL BCP-MCNC: 2.7 G/DL (ref 3.5–5.2)
ALP SERPL-CCNC: 143 U/L (ref 55–135)
ALT SERPL W/O P-5'-P-CCNC: 20 U/L (ref 10–44)
ANION GAP SERPL CALC-SCNC: 8 MMOL/L (ref 8–16)
AST SERPL-CCNC: 25 U/L (ref 10–40)
BASOPHILS # BLD AUTO: 0.01 K/UL (ref 0–0.2)
BASOPHILS NFR BLD: 0.1 % (ref 0–1.9)
BILIRUB SERPL-MCNC: 0.2 MG/DL (ref 0.1–1)
BLD GP AB SCN CELLS X3 SERPL QL: NORMAL
BUN SERPL-MCNC: 13 MG/DL (ref 6–20)
CALCIUM SERPL-MCNC: 8 MG/DL (ref 8.7–10.5)
CHLORIDE SERPL-SCNC: 106 MMOL/L (ref 95–110)
CO2 SERPL-SCNC: 17 MMOL/L (ref 23–29)
CREAT SERPL-MCNC: 0.7 MG/DL (ref 0.5–1.4)
DIFFERENTIAL METHOD: ABNORMAL
EOSINOPHIL # BLD AUTO: 0 K/UL (ref 0–0.5)
EOSINOPHIL NFR BLD: 0 % (ref 0–8)
ERYTHROCYTE [DISTWIDTH] IN BLOOD BY AUTOMATED COUNT: 19.3 % (ref 11.5–14.5)
EST. GFR  (AFRICAN AMERICAN): >60 ML/MIN/1.73 M^2
EST. GFR  (NON AFRICAN AMERICAN): >60 ML/MIN/1.73 M^2
GLUCOSE SERPL-MCNC: 156 MG/DL (ref 70–110)
HCT VFR BLD AUTO: 33.8 % (ref 37–48.5)
HGB BLD-MCNC: 9.1 G/DL (ref 12–16)
HIV 1+2 AB+HIV1 P24 AG SERPL QL IA: NEGATIVE
IMM GRANULOCYTES # BLD AUTO: 0.06 K/UL (ref 0–0.04)
IMM GRANULOCYTES NFR BLD AUTO: 0.7 % (ref 0–0.5)
LYMPHOCYTES # BLD AUTO: 1.3 K/UL (ref 1–4.8)
LYMPHOCYTES NFR BLD: 15.1 % (ref 18–48)
MCH RBC QN AUTO: 17.4 PG (ref 27–31)
MCHC RBC AUTO-ENTMCNC: 26.9 G/DL (ref 32–36)
MCV RBC AUTO: 65 FL (ref 82–98)
MONOCYTES # BLD AUTO: 0.5 K/UL (ref 0.3–1)
MONOCYTES NFR BLD: 6.2 % (ref 4–15)
NEUTROPHILS # BLD AUTO: 6.4 K/UL (ref 1.8–7.7)
NEUTROPHILS NFR BLD: 77.9 % (ref 38–73)
NRBC BLD-RTO: 1 /100 WBC
PLATELET # BLD AUTO: 296 K/UL (ref 150–450)
PMV BLD AUTO: 11.7 FL (ref 9.2–12.9)
POCT GLUCOSE: 161 MG/DL (ref 70–110)
POCT GLUCOSE: 181 MG/DL (ref 70–110)
POCT GLUCOSE: 188 MG/DL (ref 70–110)
POCT GLUCOSE: 240 MG/DL (ref 70–110)
POTASSIUM SERPL-SCNC: 4.7 MMOL/L (ref 3.5–5.1)
PROT SERPL-MCNC: 6.4 G/DL (ref 6–8.4)
RBC # BLD AUTO: 5.24 M/UL (ref 4–5.4)
RPR SER QL: NORMAL
SODIUM SERPL-SCNC: 131 MMOL/L (ref 136–145)
WBC # BLD AUTO: 8.26 K/UL (ref 3.9–12.7)

## 2022-01-20 PROCEDURE — 27000207 HC ISOLATION

## 2022-01-20 PROCEDURE — 11000001 HC ACUTE MED/SURG PRIVATE ROOM

## 2022-01-20 PROCEDURE — 85025 COMPLETE CBC W/AUTO DIFF WBC: CPT | Performed by: STUDENT IN AN ORGANIZED HEALTH CARE EDUCATION/TRAINING PROGRAM

## 2022-01-20 PROCEDURE — 86592 SYPHILIS TEST NON-TREP QUAL: CPT | Performed by: STUDENT IN AN ORGANIZED HEALTH CARE EDUCATION/TRAINING PROGRAM

## 2022-01-20 PROCEDURE — 59025 FETAL NON-STRESS TEST: CPT | Mod: 26,,, | Performed by: OBSTETRICS & GYNECOLOGY

## 2022-01-20 PROCEDURE — 99219 PR INITIAL OBSERVATION CARE,LEVL II: ICD-10-PCS | Mod: 25,,, | Performed by: OBSTETRICS & GYNECOLOGY

## 2022-01-20 PROCEDURE — 86850 RBC ANTIBODY SCREEN: CPT | Performed by: STUDENT IN AN ORGANIZED HEALTH CARE EDUCATION/TRAINING PROGRAM

## 2022-01-20 PROCEDURE — 87389 HIV-1 AG W/HIV-1&-2 AB AG IA: CPT | Performed by: STUDENT IN AN ORGANIZED HEALTH CARE EDUCATION/TRAINING PROGRAM

## 2022-01-20 PROCEDURE — 25000003 PHARM REV CODE 250: Performed by: STUDENT IN AN ORGANIZED HEALTH CARE EDUCATION/TRAINING PROGRAM

## 2022-01-20 PROCEDURE — 63600175 PHARM REV CODE 636 W HCPCS: Performed by: STUDENT IN AN ORGANIZED HEALTH CARE EDUCATION/TRAINING PROGRAM

## 2022-01-20 PROCEDURE — 80053 COMPREHEN METABOLIC PANEL: CPT | Performed by: STUDENT IN AN ORGANIZED HEALTH CARE EDUCATION/TRAINING PROGRAM

## 2022-01-20 PROCEDURE — 59025 PR FETAL 2N-STRESS TEST: ICD-10-PCS | Mod: 26,,, | Performed by: OBSTETRICS & GYNECOLOGY

## 2022-01-20 PROCEDURE — 36415 COLL VENOUS BLD VENIPUNCTURE: CPT | Performed by: STUDENT IN AN ORGANIZED HEALTH CARE EDUCATION/TRAINING PROGRAM

## 2022-01-20 PROCEDURE — 99219 PR INITIAL OBSERVATION CARE,LEVL II: CPT | Mod: 25,,, | Performed by: OBSTETRICS & GYNECOLOGY

## 2022-01-20 RX ORDER — INSULIN ASPART 100 [IU]/ML
2 INJECTION, SOLUTION INTRAVENOUS; SUBCUTANEOUS
Status: DISCONTINUED | OUTPATIENT
Start: 2022-01-21 | End: 2022-01-21

## 2022-01-20 RX ORDER — INSULIN ASPART 100 [IU]/ML
2 INJECTION, SOLUTION INTRAVENOUS; SUBCUTANEOUS
Status: DISCONTINUED | OUTPATIENT
Start: 2022-01-20 | End: 2022-01-21

## 2022-01-20 RX ADMIN — NIFEDIPINE 30 MG: 30 TABLET, FILM COATED, EXTENDED RELEASE ORAL at 09:01

## 2022-01-20 RX ADMIN — INSULIN ASPART 4 UNITS: 100 INJECTION, SOLUTION INTRAVENOUS; SUBCUTANEOUS at 09:01

## 2022-01-20 RX ADMIN — Medication 150 MG: at 09:01

## 2022-01-20 RX ADMIN — INSULIN ASPART 2 UNITS: 100 INJECTION, SOLUTION INTRAVENOUS; SUBCUTANEOUS at 11:01

## 2022-01-20 RX ADMIN — INSULIN ASPART 1 UNITS: 100 INJECTION, SOLUTION INTRAVENOUS; SUBCUTANEOUS at 06:01

## 2022-01-20 RX ADMIN — ACETAMINOPHEN 650 MG: 325 TABLET, FILM COATED ORAL at 09:01

## 2022-01-20 RX ADMIN — INSULIN ASPART 2 UNITS: 100 INJECTION, SOLUTION INTRAVENOUS; SUBCUTANEOUS at 06:01

## 2022-01-20 RX ADMIN — PRENATAL VIT W/ FE FUMARATE-FA TAB 27-0.8 MG 1 TABLET: 27-0.8 TAB at 09:01

## 2022-01-20 RX ADMIN — INSULIN ASPART 2 UNITS: 100 INJECTION, SOLUTION INTRAVENOUS; SUBCUTANEOUS at 05:01

## 2022-01-20 RX ADMIN — HUMAN INSULIN 16 UNITS: 100 INJECTION, SUSPENSION SUBCUTANEOUS at 06:01

## 2022-01-20 RX ADMIN — INSULIN ASPART 2 UNITS: 100 INJECTION, SOLUTION INTRAVENOUS; SUBCUTANEOUS at 02:01

## 2022-01-20 NOTE — ASSESSMENT & PLAN NOTE
- Diagnosed on 1/11/22 however patient states her symptoms start on 1/03  - Asymptomatic   - Contact/airborne precautions to be removed on 1/21

## 2022-01-20 NOTE — ASSESSMENT & PLAN NOTE
- Sustained severe range BP on arrival    - s/p hydral 5/10mg IV in [facility]   - MgSO4 initiated for seizure ppx and fetal neuroprotection, discontinued at this time as BP well controlled  - Continue Procardia 30 XL  - PreE labs 01/20:   - Cr 0.7   - AST/ALT 20/25   - Plt 296   - P:C 0.9   - Will repeat labs q72h  - Plan for inpatient care until delivery at 34 weeks gestation unless indicated earlier

## 2022-01-20 NOTE — PROGRESS NOTES
[facility] - Antepartum ([place])  Obstetrics  Antepartum Progress Note    Patient Name: Allison Shaikh  MRN: [id number]  Admission Date: 2022  Hospital Length of Stay: 2 days  Attending Physician: No att. providers found  Primary Care Provider: Gerald Bernal MD    Subjective:     Principal Problem:Severe pre-eclampsia in third trimester    HPI:  Allison Shaikh is a 26 y.o. [id number] at 29w3d presents complaining of elevated blood pressure reading in [facility] clinic today. Patient noted to have BP 160s/100s in MFM clinic without preexisting pregnancy-associated hypertension diagnosis. She then presented to the [facility] and was found to have sustained severe range BP requiring a dose of IV hydralazine to bring BP to mild range. She denies headaches, scotoma, chest pain, SOB, RUQ pain.   This IUP is complicated by T2DM, obesity, anemia.  Patient denies contractions, denies vaginal bleeding, denies LOF.   Fetal Movement: normal.         Hospital Course:  2022 - Admit to L&D for PreE w/ SF (BP). MgSO4 initiated, BMZ 1/2 administered. S/p hydral 5/10 in [facility].   2022 - NAEON, blood pressure normal to mild range. Fetal monitoring reassuring. One episode of headache resolved with compazine and patient asymptomatic this morning.  2022 - BP well controlled on Procardia 30 XL and patient remains asymptomatic. Elevated blood glucose requiring sliding scale. Two fetal decels noted, however monitoring reassuring for two hours afterwards.       Obstetric HPI:  Patient reports no contractions, slightly decreased fetal movement, absent vaginal bleeding , absent loss of fluid. Denies headaches, changes in vision, chest pain, SOB, RUQ pain, or new onset edema.     Objective:     Vital Signs (Most Recent):  Temp: 98.8 °F (37.1 °C) (22)  Pulse: 66 (22 1002)  Resp: 16 (22)  BP: (!) 149/97 (22)  SpO2: 99 % (22 1002) Vital Signs (24h Range):  Temp:  [97.7 °F (36.5 °C)-98.8 °F  (37.1 °C)] 98.8 °F (37.1 °C)  Pulse:  [] 66  Resp:  [16-18] 16  SpO2:  [99 %-100 %] 99 %  BP: (128-149)/(81-98) 149/97     Weight: 90.5 kg (199 lb 6.5 oz)  Body mass index is 31.23 kg/m².    FHT: Cat 2 (reassuring) baseline 140, moderate variability, +acels, two decels noted at 1207 and 1301, fetal monitoring reassuring for two hours following  TOCO:  Quiet      Intake/Output Summary (Last 24 hours) at 1/20/2022 1517  Last data filed at 1/20/2022 0546  Gross per 24 hour   Intake --   Output 1400 ml   Net -1400 ml       Cervical Exam: deferred     Significant Labs:  Recent Lab Results       01/20/22  1147   01/20/22  0544   01/20/22  0333   01/19/22  2137   01/19/22  1911        Albumin     2.7           Alkaline Phosphatase     143           ALT     20           Anion Gap     8           AST     25           Baso #     0.01           Basophil %     0.1           BILIRUBIN TOTAL     0.2  Comment: For infants and newborns, interpretation of results should be based  on gestational age, weight and in agreement with clinical  observations.    Premature Infant recommended reference ranges:  Up to 24 hours.............<8.0 mg/dL  Up to 48 hours............<12.0 mg/dL  3-5 days..................<15.0 mg/dL  6-29 days.................<15.0 mg/dL             BUN     13           Calcium     8.0           Chloride     106           CO2     17           Creatinine     0.7           Differential Method     Automated           eGFR if      >60           eGFR if non      >60  Comment: Calculation used to obtain the estimated glomerular filtration  rate (eGFR) is the CKD-EPI equation.              Eos #     0.0           Eosinophil %     0.0           Glucose     156           Gran # (ANC)     6.4           Gran %     77.9           Group & Rh     O POS           Hematocrit     33.8           Hemoglobin     9.1           HIV 1/2 Ag/Ab     Negative           Immature Grans (Abs)      0.06  Comment: Mild elevation in immature granulocytes is non specific and   can be seen in a variety of conditions including stress response,   acute inflammation, trauma and pregnancy. Correlation with other   laboratory and clinical findings is essential.             Immature Granulocytes     0.7           INDIRECT GUIDO     NEG           Lymph #     1.3           Lymph %     15.1           MCH     17.4           MCHC     26.9           MCV     65           Mono #     0.5           Mono %     6.2           MPV     11.7           nRBC     1           Platelets     296           POCT Glucose 181   161     250   245       Potassium     4.7           PROTEIN TOTAL     6.4           RBC     5.24           RDW     19.3           RPR     Non-reactive           Sodium     131           WBC     8.26                            01/19/22  1739        Albumin       Alkaline Phosphatase       ALT       Anion Gap       AST       Baso #       Basophil %       BILIRUBIN TOTAL       BUN       Calcium       Chloride       CO2       Creatinine       Differential Method       eGFR if        eGFR if non        Eos #       Eosinophil %       Glucose       Gran # (ANC)       Gran %       Group & Rh       Hematocrit       Hemoglobin       HIV 1/2 Ag/Ab       Immature Grans (Abs)       Immature Granulocytes       INDIRECT GUIDO       Lymph #       Lymph %       MCH       MCHC       MCV       Mono #       Mono %       MPV       nRBC       Platelets       POCT Glucose 270       Potassium       PROTEIN TOTAL       RBC       RDW       RPR       Sodium       WBC             Physical Exam:   Constitutional: She is oriented to person, place, and time. She appears well-developed and well-nourished.    HENT:   Head: Normocephalic and atraumatic.    Eyes: EOM are normal.     Cardiovascular: Normal rate.     Pulmonary/Chest: Effort normal.        Abdominal: Soft. There is no abdominal tenderness.              Musculoskeletal: Normal range of motion.       Neurological: She is alert and oriented to person, place, and time.    Skin: Skin is warm and dry.    Psychiatric: She has a normal mood and affect. Her behavior is normal.       Assessment/Plan:     26 y.o. female  at 29w5d for:    * Severe pre-eclampsia in third trimester  - Sustained severe range BP on arrival    - s/p hydral 5/10mg IV in [facility]   - MgSO4 initiated for seizure ppx and fetal neuroprotection, discontinued at this time as BP well controlled  - Continue Procardia 30 XL  - PreE labs :   - Cr 0.7   - AST/ALT    - Plt 296   - P:C 0.9   - Will repeat labs q72h  - Plan for inpatient care until delivery at 34 weeks gestation unless indicated earlier      29 weeks gestation of pregnancy  - BMZ course administered -  - Consents signed and placed in chart   - Daily prenatal vitamin  - NST BID  - GBS collected on admit  - Tdap to be administered  - Vertex on bedside ultrasound  - Growth scan on : EFW 1,224g  24%, normal ROGELIO. Would repeat growth scan in 3 weeks ()      Anemia during pregnancy in third trimester  - H/H 8.7/30.9 on admit  - PO iron supplement and PNV ordered  - Asymptomatic     COVID-19  - Diagnosed on 22 however patient states her symptoms start on   - Asymptomatic   - Contact/airborne precautions to be removed on     Pregnancy with type 2 diabetes mellitus in third trimester  -  on arrival per CMP, 1u aspart ordered   - Home regimen: NPH 16u daily, Aspart 4/2/2, however per Dr. Barker's recommendation at clinic visit with add NPH 6 units nightly:   NPH 16/6 > increase to 10 units nightly   Aspart 4/2/2  - SSI ordered  - POCT Fasting and 2 hr postprandial when given diet   - Goal BG: fasting <95 and 2hr postprandial <120  - Diabetic diet ordered          Amado Tariq MD  Obstetrics  [facility] - Antepartum ([place])      Patient seen and examined. Agree with resident assessment and plan.  Feels well, no  complaints. Denies preE symptoms. Reports adequate fetal movement.    Temp:  [97.7 °F (36.5 °C)-98.8 °F (37.1 °C)] 98.8 °F (37.1 °C)  Pulse:  [] 66  Resp:  [16-18] 16  SpO2:  [99 %-100 %] 99 %  BP: (128-149)/(81-98) 149/97    Abdomen soft, no fundal tenderness    EFM reviewed, overall reassuring fetal status  Baseline 140bpm/mod fernando/+accels/-decels  Monitoring appropriate for gestational age  No contraction pattern noted    Continue inpatient management of pre-eclampsia with severe features  No evidence of worsening preE  Continue PO antihypertensives  Continue insulin regimen and BG monitorin  Anticipate delivery at 34 weeks or sooner as indicated by maternal and/or fetal status.    Sherron Box MD  Maternal Fetal Medicine fellow  PGY-7

## 2022-01-20 NOTE — ASSESSMENT & PLAN NOTE
- BMZ course administered 1/18-19  - Consents signed and placed in chart   - Daily prenatal vitamin  - NST BID  - GBS collected on admit  - Tdap to be administered  - Vertex on bedside ultrasound  - Growth scan on 1/18: EFW 1,224g  24%, normal ROGELIO. Would repeat growth scan in 3 weeks (02/08)

## 2022-01-20 NOTE — ASSESSMENT & PLAN NOTE
-  on arrival per CMP, 1u aspart ordered   - Home regimen: NPH 16u daily, Aspart 4/2/2, however per Dr. Barker's recommendation at clinic visit with add NPH 6 units nightly:   NPH 16/6 > increase to 10 units nightly   Aspart 4/2/2  - SSI ordered  - POCT Fasting and 2 hr postprandial when given diet   - Goal BG: fasting <95 and 2hr postprandial <120  - Diabetic diet ordered

## 2022-01-20 NOTE — PLAN OF CARE
Pt aaox3, denies pain, ctx, lof, or bleeding; positive fetal movement. POC reviewed, questions answered and understanding verbalized.

## 2022-01-20 NOTE — SUBJECTIVE & OBJECTIVE
Obstetric HPI:  Patient reports no contractions, slightly decreased fetal movement, absent vaginal bleeding , absent loss of fluid. Denies headaches, changes in vision, chest pain, SOB, RUQ pain, or new onset edema.     Objective:     Vital Signs (Most Recent):  Temp: 98.8 °F (37.1 °C) (01/20/22 0917)  Pulse: 66 (01/20/22 1002)  Resp: 16 (01/20/22 0917)  BP: (!) 149/97 (01/20/22 0917)  SpO2: 99 % (01/20/22 1002) Vital Signs (24h Range):  Temp:  [97.7 °F (36.5 °C)-98.8 °F (37.1 °C)] 98.8 °F (37.1 °C)  Pulse:  [] 66  Resp:  [16-18] 16  SpO2:  [99 %-100 %] 99 %  BP: (128-149)/(81-98) 149/97     Weight: 90.5 kg (199 lb 6.5 oz)  Body mass index is 31.23 kg/m².    FHT: Cat 2 (reassuring) baseline 140, moderate variability, +acels, two decels noted at 1207 and 1301, fetal monitoring reassuring for two hours following  TOCO:  Quiet      Intake/Output Summary (Last 24 hours) at 1/20/2022 1517  Last data filed at 1/20/2022 0546  Gross per 24 hour   Intake --   Output 1400 ml   Net -1400 ml       Cervical Exam: deferred     Significant Labs:  Recent Lab Results       01/20/22  1147   01/20/22  0544   01/20/22  0333   01/19/22  2137   01/19/22  1911        Albumin     2.7           Alkaline Phosphatase     143           ALT     20           Anion Gap     8           AST     25           Baso #     0.01           Basophil %     0.1           BILIRUBIN TOTAL     0.2  Comment: For infants and newborns, interpretation of results should be based  on gestational age, weight and in agreement with clinical  observations.    Premature Infant recommended reference ranges:  Up to 24 hours.............<8.0 mg/dL  Up to 48 hours............<12.0 mg/dL  3-5 days..................<15.0 mg/dL  6-29 days.................<15.0 mg/dL             BUN     13           Calcium     8.0           Chloride     106           CO2     17           Creatinine     0.7           Differential Method     Automated           eGFR if       >60           eGFR if non      >60  Comment: Calculation used to obtain the estimated glomerular filtration  rate (eGFR) is the CKD-EPI equation.              Eos #     0.0           Eosinophil %     0.0           Glucose     156           Gran # (ANC)     6.4           Gran %     77.9           Group & Rh     O POS           Hematocrit     33.8           Hemoglobin     9.1           HIV 1/2 Ag/Ab     Negative           Immature Grans (Abs)     0.06  Comment: Mild elevation in immature granulocytes is non specific and   can be seen in a variety of conditions including stress response,   acute inflammation, trauma and pregnancy. Correlation with other   laboratory and clinical findings is essential.             Immature Granulocytes     0.7           INDIRECT GUIDO     NEG           Lymph #     1.3           Lymph %     15.1           MCH     17.4           MCHC     26.9           MCV     65           Mono #     0.5           Mono %     6.2           MPV     11.7           nRBC     1           Platelets     296           POCT Glucose 181   161     250   245       Potassium     4.7           PROTEIN TOTAL     6.4           RBC     5.24           RDW     19.3           RPR     Non-reactive           Sodium     131           WBC     8.26                            01/19/22  1739        Albumin       Alkaline Phosphatase       ALT       Anion Gap       AST       Baso #       Basophil %       BILIRUBIN TOTAL       BUN       Calcium       Chloride       CO2       Creatinine       Differential Method       eGFR if        eGFR if non        Eos #       Eosinophil %       Glucose       Gran # (ANC)       Gran %       Group & Rh       Hematocrit       Hemoglobin       HIV 1/2 Ag/Ab       Immature Grans (Abs)       Immature Granulocytes       INDIRECT GUIDO       Lymph #       Lymph %       MCH       MCHC       MCV       Mono #       Mono %       MPV       nRBC       Platelets        POCT Glucose 270       Potassium       PROTEIN TOTAL       RBC       RDW       RPR       Sodium       WBC             Physical Exam:   Constitutional: She is oriented to person, place, and time. She appears well-developed and well-nourished.    HENT:   Head: Normocephalic and atraumatic.    Eyes: EOM are normal.     Cardiovascular: Normal rate.     Pulmonary/Chest: Effort normal.        Abdominal: Soft. There is no abdominal tenderness.             Musculoskeletal: Normal range of motion.       Neurological: She is alert and oriented to person, place, and time.    Skin: Skin is warm and dry.    Psychiatric: She has a normal mood and affect. Her behavior is normal.

## 2022-01-21 LAB
BACTERIA SPEC AEROBE CULT: NORMAL
POCT GLUCOSE: 118 MG/DL (ref 70–110)
POCT GLUCOSE: 135 MG/DL (ref 70–110)
POCT GLUCOSE: 144 MG/DL (ref 70–110)
POCT GLUCOSE: 201 MG/DL (ref 70–110)

## 2022-01-21 PROCEDURE — 25000003 PHARM REV CODE 250: Performed by: STUDENT IN AN ORGANIZED HEALTH CARE EDUCATION/TRAINING PROGRAM

## 2022-01-21 PROCEDURE — 82962 GLUCOSE BLOOD TEST: CPT

## 2022-01-21 PROCEDURE — 59025 FETAL NON-STRESS TEST: CPT | Mod: 26,,, | Performed by: OBSTETRICS & GYNECOLOGY

## 2022-01-21 PROCEDURE — 99225 PR SUBSEQUENT OBSERVATION CARE,LEVEL II: ICD-10-PCS | Mod: 25,,, | Performed by: OBSTETRICS & GYNECOLOGY

## 2022-01-21 PROCEDURE — G0378 HOSPITAL OBSERVATION PER HR: HCPCS

## 2022-01-21 PROCEDURE — 63600175 PHARM REV CODE 636 W HCPCS: Performed by: STUDENT IN AN ORGANIZED HEALTH CARE EDUCATION/TRAINING PROGRAM

## 2022-01-21 PROCEDURE — 11000001 HC ACUTE MED/SURG PRIVATE ROOM

## 2022-01-21 PROCEDURE — 59025 PR FETAL 2N-STRESS TEST: ICD-10-PCS | Mod: 26,,, | Performed by: OBSTETRICS & GYNECOLOGY

## 2022-01-21 PROCEDURE — 99225 PR SUBSEQUENT OBSERVATION CARE,LEVEL II: CPT | Mod: 25,,, | Performed by: OBSTETRICS & GYNECOLOGY

## 2022-01-21 RX ORDER — INSULIN ASPART 100 [IU]/ML
8 INJECTION, SOLUTION INTRAVENOUS; SUBCUTANEOUS
Status: DISCONTINUED | OUTPATIENT
Start: 2022-01-22 | End: 2022-01-23

## 2022-01-21 RX ORDER — INSULIN ASPART 100 [IU]/ML
8 INJECTION, SOLUTION INTRAVENOUS; SUBCUTANEOUS
Status: DISCONTINUED | OUTPATIENT
Start: 2022-01-21 | End: 2022-01-23

## 2022-01-21 RX ORDER — INSULIN ASPART 100 [IU]/ML
4 INJECTION, SOLUTION INTRAVENOUS; SUBCUTANEOUS
Status: DISCONTINUED | OUTPATIENT
Start: 2022-01-21 | End: 2022-01-23

## 2022-01-21 RX ADMIN — NIFEDIPINE 30 MG: 30 TABLET, FILM COATED, EXTENDED RELEASE ORAL at 08:01

## 2022-01-21 RX ADMIN — INSULIN ASPART 3 UNITS: 100 INJECTION, SOLUTION INTRAVENOUS; SUBCUTANEOUS at 03:01

## 2022-01-21 RX ADMIN — DIPHENHYDRAMINE HYDROCHLORIDE 25 MG: 25 CAPSULE ORAL at 07:01

## 2022-01-21 RX ADMIN — HUMAN INSULIN 16 UNITS: 100 INJECTION, SUSPENSION SUBCUTANEOUS at 05:01

## 2022-01-21 RX ADMIN — PRENATAL VIT W/ FE FUMARATE-FA TAB 27-0.8 MG 1 TABLET: 27-0.8 TAB at 08:01

## 2022-01-21 RX ADMIN — INSULIN ASPART 4 UNITS: 100 INJECTION, SOLUTION INTRAVENOUS; SUBCUTANEOUS at 08:01

## 2022-01-21 RX ADMIN — INSULIN ASPART 4 UNITS: 100 INJECTION, SOLUTION INTRAVENOUS; SUBCUTANEOUS at 12:01

## 2022-01-21 RX ADMIN — HUMAN INSULIN 14 UNITS: 100 INJECTION, SUSPENSION SUBCUTANEOUS at 08:01

## 2022-01-21 RX ADMIN — Medication 150 MG: at 08:01

## 2022-01-21 NOTE — PROGRESS NOTES
[facility] - Antepartum ([place])  Obstetrics  Antepartum Progress Note    Patient Name: Allison Shaikh  MRN: [id number]  Admission Date: 2022  Hospital Length of Stay: 3 days  Attending Physician: No att. providers found  Primary Care Provider: Gerald Bernal MD    Subjective:     Principal Problem:Severe pre-eclampsia in third trimester    HPI:  Allison Shaikh is a 26 y.o. [id number] at 29w3d presents complaining of elevated blood pressure reading in [facility] clinic today. Patient noted to have BP 160s/100s in MFM clinic without preexisting pregnancy-associated hypertension diagnosis. She then presented to the [facility] and was found to have sustained severe range BP requiring a dose of IV hydralazine to bring BP to mild range. She denies headaches, scotoma, chest pain, SOB, RUQ pain.   This IUP is complicated by T2DM, obesity, anemia.  Patient denies contractions, denies vaginal bleeding, denies LOF.   Fetal Movement: normal.         Hospital Course:  2022 - Admit to L&D for PreE w/ SF (BP). MgSO4 initiated, BMZ 1/2 administered. S/p hydral 5/10 in [facility].   2022 - NAEON, blood pressure normal to mild range. Fetal monitoring reassuring. One episode of headache resolved with compazine and patient asymptomatic this morning.  2022 - BP well controlled on Procardia 30 XL and patient remains asymptomatic. Elevated blood glucose requiring sliding scale. Two fetal decels noted, however monitoring reassuring for two hours afterwards.   2022 - BP well controlled overnight, asymptomatic. Post-dinner , did not receive SSI due to communication error. Fasting  this AM. NST cat 1, reassuring.       Obstetric HPI:  Patient reports no contractions, slightly decreased fetal movement, absent vaginal bleeding , absent loss of fluid. Denies headaches, changes in vision, chest pain, SOB, RUQ pain, or new onset edema.     Objective:     Vital Signs (Most Recent):  Temp: 98.3 °F (36.8 °C) (22  0015)  Pulse: (!) 59 (22 050)  Resp: 18 (22 050)  BP: (!) 142/88 (22 050)  SpO2: 100 % (22) Vital Signs (24h Range):  Temp:  [98.2 °F (36.8 °C)-98.8 °F (37.1 °C)] 98.3 °F (36.8 °C)  Pulse:  [59-85] 59  Resp:  [16-18] 18  SpO2:  [99 %-100 %] 100 %  BP: (110-149)/(67-97) 142/88     Weight: 93.8 kg (206 lb 12.7 oz)  Body mass index is 32.39 kg/m².    FHT: Cat 1, reassuring with baseline 150, moderate accels, no accels, no decels   TOCO:  Quiet    No intake or output data in the 24 hours ending 22 0619    Cervical Exam: deferred     Significant Labs:  Recent Lab Results       22  1719   22  1147        POCT Glucose 240   188   181             Physical Exam:   Constitutional: She is oriented to person, place, and time. She appears well-developed and well-nourished.    HENT:   Head: Normocephalic and atraumatic.    Eyes: EOM are normal.     Cardiovascular: Normal rate.     Pulmonary/Chest: Effort normal.        Abdominal: Soft. There is no abdominal tenderness.             Musculoskeletal: Normal range of motion.       Neurological: She is alert and oriented to person, place, and time.    Skin: Skin is warm and dry.    Psychiatric: She has a normal mood and affect. Her behavior is normal.       Assessment/Plan:     26 y.o. female  at 29w6d for:    * Severe pre-eclampsia in third trimester  - Sustained severe range BP on arrival    - s/p hydral 5/10mg IV in [facility], no further pushes sinec admission    - MgSO4 initiated for seizure ppx and fetal neuroprotection, discontinued at this time as BP well controlled  - Continue Procardia 30 XL  - PreE labs :   - Cr 0.7   - AST/ALT    - Plt 296   - P:C 0.9   - Will repeat labs q72h  - Plan for inpatient care until delivery at 34 weeks gestation unless indicated earlier      29 weeks gestation of pregnancy  - BMZ course administered -  - Consents signed and placed in chart   - Daily prenatal  vitamin  - NST BID  - GBS collected on admit  - Tdap to be administered  - Vertex on bedside ultrasound  - Growth scan on 1/18: EFW 1,224g  24%, normal ROEGLIO. Would repeat growth scan in 3 weeks (02/08)      Anemia during pregnancy in third trimester  - H/H 8.7/30.9 on admit  - PO iron supplement and PNV ordered  - Asymptomatic     COVID-19  - Diagnosed on 1/11/22 however patient states her symptoms start on 1/03  - Asymptomatic   - Contact/airborne precautions to be removed on 1/21    Pregnancy with type 2 diabetes mellitus in third trimester  - -240 over last 24 hours, fasting  this AM   - Home regimen: NPH 16u daily, Aspart 4/2/2, however per Dr. Barker's recommendation at clinic visit with add NPH 6 units nightly:   NPH 16/10    Aspart 4/2/2  - SSI ordered  - POCT Fasting and 2 hr postprandial when given diet   - Goal BG: fasting <95 and 2hr postprandial <120  - Diabetic diet ordered          Annie Love MD  Obstetrics  [facility] - Antepartum ([place])      Patient seen and examined. Agree with resident assessment and plan.  No complaints. Denies preE symptoms. Reports adequate fetal movement.    Temp:  [98.2 °F (36.8 °C)-98.3 °F (36.8 °C)] 98.3 °F (36.8 °C)  Pulse:  [59-85] 59  Resp:  [16-18] 18  SpO2:  [99 %-100 %] 100 %  BP: (110-142)/(67-88) 142/88    Abdomen soft, no fundal tenderness    EFM reviewed, overall reassuring fetal status  Baseline 135bpm/mod fernando/+accels/-decels  Monitoring appropriate for gestational age  No contraction pattern noted    BGs reviewed    Continue inpatient management of pre-eclampsia with severe features  No evidence of worsening preE  Continue PO antihypertensives  Continue insulin regimen and BG monitoring. Will uptitrate insulin today to: NPH 20/14, Aspart 8/4/8  Anticipate delivery at 34 weeks or sooner as indicated by maternal and/or fetal status.    Sherron Box MD  Maternal Fetal Medicine fellow  PGY-7

## 2022-01-21 NOTE — NURSING
Three attempts made to call provider about review fetal monitoring strip. Awaiting call back.   BS post. Prandial tonight is 240. Will inform MD when they return call.

## 2022-01-21 NOTE — PLAN OF CARE
Problem: Diabetes in Pregnancy  Goal: Blood Glucose Level Within Targeted Range  Outcome: Ongoing, Progressing     Problem:  Fall Injury Risk  Goal: Absence of Fall, Infant Drop and Related Injury  Outcome: Ongoing, Progressing     Problem: Adult Inpatient Plan of Care  Goal: Plan of Care Review  Outcome: Ongoing, Progressing  Goal: Patient-Specific Goal (Individualized)  Outcome: Ongoing, Progressing  Goal: Absence of Hospital-Acquired Illness or Injury  Outcome: Ongoing, Progressing  Goal: Optimal Comfort and Wellbeing  Outcome: Ongoing, Progressing  Goal: Readiness for Transition of Care  Outcome: Ongoing, Progressing     Problem: Diabetes Comorbidity  Goal: Blood Glucose Level Within Targeted Range  Outcome: Ongoing, Progressing     Problem: Hypertensive Disorders in Pregnancy  Goal: Maternal-Fetal Stabilization  Outcome: Ongoing, Progressing     Problem: Maternal-Fetal Wellbeing  Goal: Optimal Maternal-Fetal Wellbeing  Outcome: Ongoing, Progressing

## 2022-01-21 NOTE — ASSESSMENT & PLAN NOTE
- -240 over last 24 hours, fasting  this AM   - Home regimen: NPH 16u daily, Aspart 4/2/2, however per Dr. Barker's recommendation at clinic visit with add NPH 6 units nightly:   NPH 16/10    Aspart 4/2/2  - SSI ordered  - POCT Fasting and 2 hr postprandial when given diet   - Goal BG: fasting <95 and 2hr postprandial <120  - Diabetic diet ordered

## 2022-01-21 NOTE — SUBJECTIVE & OBJECTIVE
Obstetric HPI:  Patient reports no contractions, slightly decreased fetal movement, absent vaginal bleeding , absent loss of fluid. Denies headaches, changes in vision, chest pain, SOB, RUQ pain, or new onset edema.     Objective:     Vital Signs (Most Recent):  Temp: 98.3 °F (36.8 °C) (01/21/22 0015)  Pulse: (!) 59 (01/21/22 0507)  Resp: 18 (01/21/22 0507)  BP: (!) 142/88 (01/21/22 0507)  SpO2: 100 % (01/21/22 0507) Vital Signs (24h Range):  Temp:  [98.2 °F (36.8 °C)-98.8 °F (37.1 °C)] 98.3 °F (36.8 °C)  Pulse:  [59-85] 59  Resp:  [16-18] 18  SpO2:  [99 %-100 %] 100 %  BP: (110-149)/(67-97) 142/88     Weight: 93.8 kg (206 lb 12.7 oz)  Body mass index is 32.39 kg/m².    FHT: Cat 1, reassuring with baseline 150, moderate accels, no accels, no decels   TOCO:  Quiet    No intake or output data in the 24 hours ending 01/21/22 0619    Cervical Exam: deferred     Significant Labs:  Recent Lab Results       01/20/22 2053 01/20/22  1719   01/20/22  1147        POCT Glucose 240   188   181             Physical Exam:   Constitutional: She is oriented to person, place, and time. She appears well-developed and well-nourished.    HENT:   Head: Normocephalic and atraumatic.    Eyes: EOM are normal.     Cardiovascular: Normal rate.     Pulmonary/Chest: Effort normal.        Abdominal: Soft. There is no abdominal tenderness.             Musculoskeletal: Normal range of motion.       Neurological: She is alert and oriented to person, place, and time.    Skin: Skin is warm and dry.    Psychiatric: She has a normal mood and affect. Her behavior is normal.

## 2022-01-21 NOTE — ASSESSMENT & PLAN NOTE
- Sustained severe range BP on arrival    - s/p hydral 5/10mg IV in [facility], no further pushes sinec admission    - MgSO4 initiated for seizure ppx and fetal neuroprotection, discontinued at this time as BP well controlled  - Continue Procardia 30 XL  - PreE labs 01/20:   - Cr 0.7   - AST/ALT 20/25   - Plt 296   - P:C 0.9   - Will repeat labs q72h  - Plan for inpatient care until delivery at 34 weeks gestation unless indicated earlier

## 2022-01-22 LAB
ALBUMIN SERPL BCP-MCNC: 2.7 G/DL (ref 3.5–5.2)
ALP SERPL-CCNC: 156 U/L (ref 55–135)
ALT SERPL W/O P-5'-P-CCNC: 15 U/L (ref 10–44)
ANION GAP SERPL CALC-SCNC: 11 MMOL/L (ref 8–16)
ANISOCYTOSIS BLD QL SMEAR: SLIGHT
AST SERPL-CCNC: 24 U/L (ref 10–40)
BASOPHILS # BLD AUTO: 0.03 K/UL (ref 0–0.2)
BASOPHILS NFR BLD: 0.1 % (ref 0–1.9)
BILIRUB SERPL-MCNC: 0.2 MG/DL (ref 0.1–1)
BUN SERPL-MCNC: 11 MG/DL (ref 6–20)
BURR CELLS BLD QL SMEAR: ABNORMAL
CALCIUM SERPL-MCNC: 8.5 MG/DL (ref 8.7–10.5)
CHLORIDE SERPL-SCNC: 103 MMOL/L (ref 95–110)
CO2 SERPL-SCNC: 18 MMOL/L (ref 23–29)
CREAT SERPL-MCNC: 0.7 MG/DL (ref 0.5–1.4)
CREAT UR-MCNC: 35 MG/DL (ref 15–325)
DACRYOCYTES BLD QL SMEAR: ABNORMAL
DIFFERENTIAL METHOD: ABNORMAL
EOSINOPHIL # BLD AUTO: 0 K/UL (ref 0–0.5)
EOSINOPHIL NFR BLD: 0.2 % (ref 0–8)
ERYTHROCYTE [DISTWIDTH] IN BLOOD BY AUTOMATED COUNT: 19.9 % (ref 11.5–14.5)
EST. GFR  (AFRICAN AMERICAN): >60 ML/MIN/1.73 M^2
EST. GFR  (NON AFRICAN AMERICAN): >60 ML/MIN/1.73 M^2
GLUCOSE SERPL-MCNC: 141 MG/DL (ref 70–110)
HCT VFR BLD AUTO: 35.9 % (ref 37–48.5)
HGB BLD-MCNC: 10.1 G/DL (ref 12–16)
HYPOCHROMIA BLD QL SMEAR: ABNORMAL
IMM GRANULOCYTES # BLD AUTO: 0.13 K/UL (ref 0–0.04)
IMM GRANULOCYTES NFR BLD AUTO: 0.6 % (ref 0–0.5)
LYMPHOCYTES # BLD AUTO: 1 K/UL (ref 1–4.8)
LYMPHOCYTES NFR BLD: 4.6 % (ref 18–48)
MCH RBC QN AUTO: 17.9 PG (ref 27–31)
MCHC RBC AUTO-ENTMCNC: 28.1 G/DL (ref 32–36)
MCV RBC AUTO: 64 FL (ref 82–98)
MONOCYTES # BLD AUTO: 0.5 K/UL (ref 0.3–1)
MONOCYTES NFR BLD: 2.5 % (ref 4–15)
NEUTROPHILS # BLD AUTO: 19.5 K/UL (ref 1.8–7.7)
NEUTROPHILS NFR BLD: 92 % (ref 38–73)
NRBC BLD-RTO: 3 /100 WBC
OVALOCYTES BLD QL SMEAR: ABNORMAL
PLATELET # BLD AUTO: 313 K/UL (ref 150–450)
PLATELET BLD QL SMEAR: ABNORMAL
PMV BLD AUTO: 10.4 FL (ref 9.2–12.9)
POCT GLUCOSE: 101 MG/DL (ref 70–110)
POCT GLUCOSE: 107 MG/DL (ref 70–110)
POCT GLUCOSE: 119 MG/DL (ref 70–110)
POCT GLUCOSE: 147 MG/DL (ref 70–110)
POCT GLUCOSE: 205 MG/DL (ref 70–110)
POCT GLUCOSE: 65 MG/DL (ref 70–110)
POIKILOCYTOSIS BLD QL SMEAR: SLIGHT
POTASSIUM SERPL-SCNC: 4.8 MMOL/L (ref 3.5–5.1)
PROT SERPL-MCNC: 6.8 G/DL (ref 6–8.4)
PROT UR-MCNC: 69 MG/DL (ref 0–15)
PROT/CREAT UR: 1.97 MG/G{CREAT} (ref 0–0.2)
RBC # BLD AUTO: 5.64 M/UL (ref 4–5.4)
SCHISTOCYTES BLD QL SMEAR: PRESENT
SODIUM SERPL-SCNC: 132 MMOL/L (ref 136–145)
SPHEROCYTES BLD QL SMEAR: ABNORMAL
TARGETS BLD QL SMEAR: ABNORMAL
WBC # BLD AUTO: 21.18 K/UL (ref 3.9–12.7)

## 2022-01-22 PROCEDURE — 63600175 PHARM REV CODE 636 W HCPCS: Performed by: STUDENT IN AN ORGANIZED HEALTH CARE EDUCATION/TRAINING PROGRAM

## 2022-01-22 PROCEDURE — 88307 TISSUE EXAM BY PATHOLOGIST: CPT | Performed by: PATHOLOGY

## 2022-01-22 PROCEDURE — 25000003 PHARM REV CODE 250: Performed by: STUDENT IN AN ORGANIZED HEALTH CARE EDUCATION/TRAINING PROGRAM

## 2022-01-22 PROCEDURE — 37000009 HC ANESTHESIA EA ADD 15 MINS: Performed by: OBSTETRICS & GYNECOLOGY

## 2022-01-22 PROCEDURE — 80053 COMPREHEN METABOLIC PANEL: CPT | Performed by: OBSTETRICS & GYNECOLOGY

## 2022-01-22 PROCEDURE — 99900035 HC TECH TIME PER 15 MIN (STAT)

## 2022-01-22 PROCEDURE — 99233 PR SUBSEQUENT HOSPITAL CARE,LEVL III: ICD-10-PCS | Mod: 25,,, | Performed by: OBSTETRICS & GYNECOLOGY

## 2022-01-22 PROCEDURE — 85025 COMPLETE CBC W/AUTO DIFF WBC: CPT | Performed by: OBSTETRICS & GYNECOLOGY

## 2022-01-22 PROCEDURE — 36416 COLLJ CAPILLARY BLOOD SPEC: CPT

## 2022-01-22 PROCEDURE — 82570 ASSAY OF URINE CREATININE: CPT | Performed by: STUDENT IN AN ORGANIZED HEALTH CARE EDUCATION/TRAINING PROGRAM

## 2022-01-22 PROCEDURE — 59025 FETAL NON-STRESS TEST: CPT | Mod: 26,,, | Performed by: OBSTETRICS & GYNECOLOGY

## 2022-01-22 PROCEDURE — 88307 TISSUE EXAM BY PATHOLOGIST: CPT | Mod: 26,,, | Performed by: PATHOLOGY

## 2022-01-22 PROCEDURE — 59025 PR FETAL 2N-STRESS TEST: ICD-10-PCS | Mod: 26,,, | Performed by: OBSTETRICS & GYNECOLOGY

## 2022-01-22 PROCEDURE — 71000033 HC RECOVERY, INTIAL HOUR: Performed by: OBSTETRICS & GYNECOLOGY

## 2022-01-22 PROCEDURE — 36004724 HC OB OR TIME LEV III - 1ST 15 MIN: Performed by: OBSTETRICS & GYNECOLOGY

## 2022-01-22 PROCEDURE — 63600175 PHARM REV CODE 636 W HCPCS

## 2022-01-22 PROCEDURE — 71000039 HC RECOVERY, EACH ADD'L HOUR: Performed by: OBSTETRICS & GYNECOLOGY

## 2022-01-22 PROCEDURE — 59514 CESAREAN DELIVERY ONLY: CPT | Mod: ,,, | Performed by: ANESTHESIOLOGY

## 2022-01-22 PROCEDURE — 99233 SBSQ HOSP IP/OBS HIGH 50: CPT | Mod: 25,,, | Performed by: OBSTETRICS & GYNECOLOGY

## 2022-01-22 PROCEDURE — 59514 CESAREAN DELIVERY ONLY: CPT | Mod: AT,,, | Performed by: OBSTETRICS & GYNECOLOGY

## 2022-01-22 PROCEDURE — 37000008 HC ANESTHESIA 1ST 15 MINUTES: Performed by: OBSTETRICS & GYNECOLOGY

## 2022-01-22 PROCEDURE — 59514 PRA REAN DELIVERY ONLY: ICD-10-PCS | Mod: ,,, | Performed by: ANESTHESIOLOGY

## 2022-01-22 PROCEDURE — 82803 BLOOD GASES ANY COMBINATION: CPT

## 2022-01-22 PROCEDURE — 88307 PR  SURG PATH,LEVEL V: ICD-10-PCS | Mod: 26,,, | Performed by: PATHOLOGY

## 2022-01-22 PROCEDURE — 36004725 HC OB OR TIME LEV III - EA ADD 15 MIN: Performed by: OBSTETRICS & GYNECOLOGY

## 2022-01-22 PROCEDURE — 11000001 HC ACUTE MED/SURG PRIVATE ROOM

## 2022-01-22 PROCEDURE — 59514 PR CESAREAN DELIVERY ONLY: ICD-10-PCS | Mod: AT,,, | Performed by: OBSTETRICS & GYNECOLOGY

## 2022-01-22 RX ORDER — SIMETHICONE 80 MG
1 TABLET,CHEWABLE ORAL EVERY 6 HOURS PRN
Status: DISCONTINUED | OUTPATIENT
Start: 2022-01-22 | End: 2022-01-25 | Stop reason: HOSPADM

## 2022-01-22 RX ORDER — CALCIUM GLUCONATE 98 MG/ML
1 INJECTION, SOLUTION INTRAVENOUS
Status: DISCONTINUED | OUTPATIENT
Start: 2022-01-22 | End: 2022-01-22

## 2022-01-22 RX ORDER — DEXAMETHASONE SODIUM PHOSPHATE 4 MG/ML
INJECTION, SOLUTION INTRA-ARTICULAR; INTRALESIONAL; INTRAMUSCULAR; INTRAVENOUS; SOFT TISSUE
Status: DISCONTINUED | OUTPATIENT
Start: 2022-01-22 | End: 2022-01-22

## 2022-01-22 RX ORDER — ACETAMINOPHEN 325 MG/1
650 TABLET ORAL EVERY 6 HOURS
Status: COMPLETED | OUTPATIENT
Start: 2022-01-22 | End: 2022-01-23

## 2022-01-22 RX ORDER — OXYCODONE AND ACETAMINOPHEN 10; 325 MG/1; MG/1
1 TABLET ORAL EVERY 4 HOURS PRN
Status: DISCONTINUED | OUTPATIENT
Start: 2022-01-23 | End: 2022-01-25 | Stop reason: HOSPADM

## 2022-01-22 RX ORDER — KETOROLAC TROMETHAMINE 30 MG/ML
30 INJECTION, SOLUTION INTRAMUSCULAR; INTRAVENOUS EVERY 6 HOURS
Status: COMPLETED | OUTPATIENT
Start: 2022-01-22 | End: 2022-01-23

## 2022-01-22 RX ORDER — FAMOTIDINE 10 MG/ML
INJECTION INTRAVENOUS
Status: COMPLETED
Start: 2022-01-22 | End: 2022-01-22

## 2022-01-22 RX ORDER — AMOXICILLIN 250 MG
1 CAPSULE ORAL NIGHTLY PRN
Status: DISCONTINUED | OUTPATIENT
Start: 2022-01-22 | End: 2022-01-25 | Stop reason: HOSPADM

## 2022-01-22 RX ORDER — DIPHENHYDRAMINE HYDROCHLORIDE 50 MG/ML
INJECTION INTRAMUSCULAR; INTRAVENOUS
Status: DISCONTINUED | OUTPATIENT
Start: 2022-01-22 | End: 2022-01-22

## 2022-01-22 RX ORDER — MAGNESIUM SULFATE HEPTAHYDRATE 40 MG/ML
2 INJECTION, SOLUTION INTRAVENOUS CONTINUOUS
Status: DISCONTINUED | OUTPATIENT
Start: 2022-01-22 | End: 2022-01-22

## 2022-01-22 RX ORDER — ONDANSETRON 8 MG/1
8 TABLET, ORALLY DISINTEGRATING ORAL EVERY 8 HOURS PRN
Status: CANCELLED | OUTPATIENT
Start: 2022-01-22

## 2022-01-22 RX ORDER — SODIUM CHLORIDE, SODIUM LACTATE, POTASSIUM CHLORIDE, CALCIUM CHLORIDE 600; 310; 30; 20 MG/100ML; MG/100ML; MG/100ML; MG/100ML
INJECTION, SOLUTION INTRAVENOUS CONTINUOUS PRN
Status: DISCONTINUED | OUTPATIENT
Start: 2022-01-22 | End: 2022-01-22

## 2022-01-22 RX ORDER — OXYCODONE AND ACETAMINOPHEN 5; 325 MG/1; MG/1
1 TABLET ORAL EVERY 4 HOURS PRN
Status: DISCONTINUED | OUTPATIENT
Start: 2022-01-23 | End: 2022-01-25 | Stop reason: HOSPADM

## 2022-01-22 RX ORDER — BISACODYL 10 MG
10 SUPPOSITORY, RECTAL RECTAL ONCE AS NEEDED
Status: DISCONTINUED | OUTPATIENT
Start: 2022-01-22 | End: 2022-01-25 | Stop reason: HOSPADM

## 2022-01-22 RX ORDER — SODIUM CITRATE AND CITRIC ACID MONOHYDRATE 334; 500 MG/5ML; MG/5ML
SOLUTION ORAL
Status: COMPLETED
Start: 2022-01-22 | End: 2022-01-22

## 2022-01-22 RX ORDER — MORPHINE SULFATE 1 MG/ML
INJECTION, SOLUTION EPIDURAL; INTRATHECAL; INTRAVENOUS
Status: DISCONTINUED | OUTPATIENT
Start: 2022-01-22 | End: 2022-01-22

## 2022-01-22 RX ORDER — OXYTOCIN/RINGER'S LACTATE 30/500 ML
95 PLASTIC BAG, INJECTION (ML) INTRAVENOUS ONCE
Status: DISCONTINUED | OUTPATIENT
Start: 2022-01-22 | End: 2022-01-25 | Stop reason: HOSPADM

## 2022-01-22 RX ORDER — SODIUM CITRATE AND CITRIC ACID MONOHYDRATE 334; 500 MG/5ML; MG/5ML
SOLUTION ORAL
Status: DISCONTINUED | OUTPATIENT
Start: 2022-01-22 | End: 2022-01-22

## 2022-01-22 RX ORDER — KETOROLAC TROMETHAMINE 30 MG/ML
INJECTION, SOLUTION INTRAMUSCULAR; INTRAVENOUS
Status: DISCONTINUED | OUTPATIENT
Start: 2022-01-22 | End: 2022-01-22

## 2022-01-22 RX ORDER — ADHESIVE BANDAGE
30 BANDAGE TOPICAL 2 TIMES DAILY PRN
Status: DISCONTINUED | OUTPATIENT
Start: 2022-01-23 | End: 2022-01-25 | Stop reason: HOSPADM

## 2022-01-22 RX ORDER — OXYTOCIN/RINGER'S LACTATE 30/500 ML
95 PLASTIC BAG, INJECTION (ML) INTRAVENOUS ONCE
Status: COMPLETED | OUTPATIENT
Start: 2022-01-22 | End: 2022-01-22

## 2022-01-22 RX ORDER — ACETAMINOPHEN 10 MG/ML
INJECTION, SOLUTION INTRAVENOUS
Status: DISCONTINUED | OUTPATIENT
Start: 2022-01-22 | End: 2022-01-22

## 2022-01-22 RX ORDER — OXYCODONE HYDROCHLORIDE 5 MG/1
10 TABLET ORAL EVERY 4 HOURS PRN
Status: DISPENSED | OUTPATIENT
Start: 2022-01-22 | End: 2022-01-23

## 2022-01-22 RX ORDER — FENTANYL CITRATE 50 UG/ML
INJECTION, SOLUTION INTRAMUSCULAR; INTRAVENOUS
Status: DISCONTINUED | OUTPATIENT
Start: 2022-01-22 | End: 2022-01-22

## 2022-01-22 RX ORDER — MAGNESIUM SULFATE HEPTAHYDRATE 40 MG/ML
6 INJECTION, SOLUTION INTRAVENOUS ONCE
Status: COMPLETED | OUTPATIENT
Start: 2022-01-22 | End: 2022-01-22

## 2022-01-22 RX ORDER — DIPHENHYDRAMINE HCL 25 MG
25 CAPSULE ORAL EVERY 4 HOURS PRN
Status: DISCONTINUED | OUTPATIENT
Start: 2022-01-22 | End: 2022-01-25 | Stop reason: HOSPADM

## 2022-01-22 RX ORDER — OXYCODONE HYDROCHLORIDE 5 MG/1
5 TABLET ORAL EVERY 4 HOURS PRN
Status: ACTIVE | OUTPATIENT
Start: 2022-01-22 | End: 2022-01-23

## 2022-01-22 RX ORDER — MAGNESIUM SULFATE HEPTAHYDRATE 40 MG/ML
INJECTION, SOLUTION INTRAVENOUS
Status: DISPENSED
Start: 2022-01-22 | End: 2022-01-23

## 2022-01-22 RX ORDER — MIDAZOLAM HYDROCHLORIDE 1 MG/ML
INJECTION, SOLUTION INTRAMUSCULAR; INTRAVENOUS
Status: DISCONTINUED | OUTPATIENT
Start: 2022-01-22 | End: 2022-01-22

## 2022-01-22 RX ORDER — ONDANSETRON 2 MG/ML
4 INJECTION INTRAMUSCULAR; INTRAVENOUS EVERY 6 HOURS PRN
Status: ACTIVE | OUTPATIENT
Start: 2022-01-22 | End: 2022-01-23

## 2022-01-22 RX ORDER — DOCUSATE SODIUM 100 MG/1
200 CAPSULE, LIQUID FILLED ORAL 2 TIMES DAILY
Status: DISCONTINUED | OUTPATIENT
Start: 2022-01-22 | End: 2022-01-25 | Stop reason: HOSPADM

## 2022-01-22 RX ORDER — OXYTOCIN/RINGER'S LACTATE 30/500 ML
2 PLASTIC BAG, INJECTION (ML) INTRAVENOUS CONTINUOUS
Status: DISCONTINUED | OUTPATIENT
Start: 2022-01-22 | End: 2022-01-22

## 2022-01-22 RX ORDER — OXYTOCIN/RINGER'S LACTATE 30/500 ML
PLASTIC BAG, INJECTION (ML) INTRAVENOUS
Status: COMPLETED
Start: 2022-01-22 | End: 2022-01-22

## 2022-01-22 RX ORDER — HYDROCORTISONE 25 MG/G
CREAM TOPICAL 3 TIMES DAILY PRN
Status: DISCONTINUED | OUTPATIENT
Start: 2022-01-22 | End: 2022-01-25 | Stop reason: HOSPADM

## 2022-01-22 RX ORDER — FAMOTIDINE 10 MG/ML
INJECTION INTRAVENOUS
Status: DISCONTINUED | OUTPATIENT
Start: 2022-01-22 | End: 2022-01-22

## 2022-01-22 RX ORDER — PROCHLORPERAZINE EDISYLATE 5 MG/ML
5 INJECTION INTRAMUSCULAR; INTRAVENOUS EVERY 6 HOURS PRN
Status: DISCONTINUED | OUTPATIENT
Start: 2022-01-22 | End: 2022-01-25 | Stop reason: HOSPADM

## 2022-01-22 RX ORDER — PROCHLORPERAZINE EDISYLATE 5 MG/ML
5 INJECTION INTRAMUSCULAR; INTRAVENOUS EVERY 6 HOURS PRN
Status: CANCELLED | OUTPATIENT
Start: 2022-01-22

## 2022-01-22 RX ORDER — ONDANSETRON 2 MG/ML
INJECTION INTRAMUSCULAR; INTRAVENOUS
Status: DISCONTINUED | OUTPATIENT
Start: 2022-01-22 | End: 2022-01-22

## 2022-01-22 RX ORDER — SODIUM CHLORIDE, SODIUM LACTATE, POTASSIUM CHLORIDE, CALCIUM CHLORIDE 600; 310; 30; 20 MG/100ML; MG/100ML; MG/100ML; MG/100ML
INJECTION, SOLUTION INTRAVENOUS CONTINUOUS
Status: DISCONTINUED | OUTPATIENT
Start: 2022-01-22 | End: 2022-01-22

## 2022-01-22 RX ORDER — CHLORHEXIDINE GLUCONATE ORAL RINSE 1.2 MG/ML
10 SOLUTION DENTAL 2 TIMES DAILY
Status: CANCELLED | OUTPATIENT
Start: 2022-01-22 | End: 2022-01-27

## 2022-01-22 RX ADMIN — FENTANYL CITRATE 10 MCG: 50 INJECTION, SOLUTION INTRAMUSCULAR; INTRAVENOUS at 04:01

## 2022-01-22 RX ADMIN — ONDANSETRON 4 MG: 2 INJECTION INTRAMUSCULAR; INTRAVENOUS at 04:01

## 2022-01-22 RX ADMIN — HUMAN INSULIN 14 UNITS: 100 INJECTION, SUSPENSION SUBCUTANEOUS at 09:01

## 2022-01-22 RX ADMIN — INSULIN ASPART 8 UNITS: 100 INJECTION, SOLUTION INTRAVENOUS; SUBCUTANEOUS at 07:01

## 2022-01-22 RX ADMIN — SODIUM CHLORIDE, SODIUM LACTATE, POTASSIUM CHLORIDE, AND CALCIUM CHLORIDE: 600; 310; 30; 20 INJECTION, SOLUTION INTRAVENOUS at 04:01

## 2022-01-22 RX ADMIN — NIFEDIPINE 30 MG: 30 TABLET, FILM COATED, EXTENDED RELEASE ORAL at 09:01

## 2022-01-22 RX ADMIN — Medication 95 MILLI-UNITS/MIN: at 06:01

## 2022-01-22 RX ADMIN — INSULIN ASPART 8 UNITS: 100 INJECTION, SOLUTION INTRAVENOUS; SUBCUTANEOUS at 08:01

## 2022-01-22 RX ADMIN — INSULIN ASPART 3 UNITS: 100 INJECTION, SOLUTION INTRAVENOUS; SUBCUTANEOUS at 10:01

## 2022-01-22 RX ADMIN — DEXAMETHASONE SODIUM PHOSPHATE 4 MG: 4 INJECTION INTRA-ARTICULAR; INTRALESIONAL; INTRAMUSCULAR; INTRAVENOUS; SOFT TISSUE at 04:01

## 2022-01-22 RX ADMIN — PHENYLEPHRINE HYDROCHLORIDE 0.09 MCG/KG/MIN: 10 INJECTION INTRAVENOUS at 04:01

## 2022-01-22 RX ADMIN — DIPHENHYDRAMINE HYDROCHLORIDE 12.5 MG: 50 INJECTION INTRAMUSCULAR; INTRAVENOUS at 04:01

## 2022-01-22 RX ADMIN — MAGNESIUM SULFATE HEPTAHYDRATE 6 G: 40 INJECTION, SOLUTION INTRAVENOUS at 04:01

## 2022-01-22 RX ADMIN — DEXTROSE 2 G: 50 INJECTION, SOLUTION INTRAVENOUS at 04:01

## 2022-01-22 RX ADMIN — Medication 150 MG: at 09:01

## 2022-01-22 RX ADMIN — INSULIN ASPART 4 UNITS: 100 INJECTION, SOLUTION INTRAVENOUS; SUBCUTANEOUS at 01:01

## 2022-01-22 RX ADMIN — FAMOTIDINE 20 MG: 10 INJECTION, SOLUTION INTRAVENOUS at 04:01

## 2022-01-22 RX ADMIN — OXYCODONE 10 MG: 5 TABLET ORAL at 06:01

## 2022-01-22 RX ADMIN — KETOROLAC TROMETHAMINE 30 MG: 30 INJECTION, SOLUTION INTRAMUSCULAR; INTRAVENOUS at 05:01

## 2022-01-22 RX ADMIN — PRENATAL VIT W/ FE FUMARATE-FA TAB 27-0.8 MG 1 TABLET: 27-0.8 TAB at 09:01

## 2022-01-22 RX ADMIN — MORPHINE SULFATE 0.1 MG: 1 INJECTION EPIDURAL; INTRATHECAL; INTRAVENOUS at 04:01

## 2022-01-22 RX ADMIN — MIDAZOLAM HYDROCHLORIDE 2 MG: 1 INJECTION, SOLUTION INTRAMUSCULAR; INTRAVENOUS at 04:01

## 2022-01-22 RX ADMIN — ACETAMINOPHEN 1000 MG: 10 INJECTION INTRAVENOUS at 05:01

## 2022-01-22 RX ADMIN — SODIUM CITRATE AND CITRIC ACID MONOHYDRATE 15 ML: 500; 334 SOLUTION ORAL at 04:01

## 2022-01-22 RX ADMIN — DOCUSATE SODIUM 200 MG: 100 CAPSULE, LIQUID FILLED ORAL at 09:01

## 2022-01-22 NOTE — CARE UPDATE
MD to bedside 2/2 late decels; minimal variability for last 20 min. Discussed with MFM need for urgent CS. Will start mag bolus in OR.    CONNER Anton MD  OBGYN PGY-3

## 2022-01-22 NOTE — TRANSFER OF CARE
"Anesthesia Transfer of Care Note    Patient: Allison Shaikh    Procedure(s) Performed: * No procedures listed *    Patient location: Labor and Delivery    Transport from OR: Transported from OR on room air with adequate spontaneous ventilation    Post pain: adequate analgesia    Post assessment: no apparent anesthetic complications    Post vital signs: stable    Level of consciousness: awake and alert    Nausea/Vomiting: no nausea/vomiting    Complications: none    Transfer of care protocol was followed      Last vitals:   Visit Vitals  BP (!) 144/68   Pulse 73   Temp 36.9 °C (98.5 °F) (Oral)   Resp 16   Ht 5' 7" (1.702 m)   Wt 93.8 kg (206 lb 12.7 oz)   LMP 06/19/2021   SpO2 100%   Breastfeeding No   BMI 32.39 kg/m²     "

## 2022-01-22 NOTE — ASSESSMENT & PLAN NOTE
- BMZ course administered 1/18-19  - Consents signed and placed in chart   - Daily prenatal vitamin  - NST BID  - GBS negative  - Tdap administered  - Vertex on bedside ultrasound  - Growth scan on 1/18: EFW 1,224g  24%, normal ROGELIO. Would repeat growth scan in 3 weeks (02/08)

## 2022-01-22 NOTE — PROGRESS NOTES
[facility], [place], operated by [facility] - Antepartum ([place])  Obstetrics  Antepartum Progress Note    Patient Name: Allison Shaikh  MRN: [id number]  Admission Date: 2022  Hospital Length of Stay: 3 days  Attending Physician: Brayden Smith MD  Primary Care Provider: Gerald Bernal MD    Subjective:     Principal Problem:Severe pre-eclampsia in third trimester    HPI:  Allison Shaikh is a 26 y.o. [id number] at 29w3d presents complaining of elevated blood pressure reading in [facility] clinic today. Patient noted to have BP 160s/100s in MFM clinic without preexisting pregnancy-associated hypertension diagnosis. She then presented to the [facility] and was found to have sustained severe range BP requiring a dose of IV hydralazine to bring BP to mild range. She denies headaches, scotoma, chest pain, SOB, RUQ pain.   This IUP is complicated by T2DM, obesity, anemia.  Patient denies contractions, denies vaginal bleeding, denies LOF.   Fetal Movement: normal.         Hospital Course:  2022 - Admit to L&D for PreE w/ SF (BP). MgSO4 initiated, BMZ 1/2 administered. S/p hydral 5/10 in [facility].   2022 - NAEON, blood pressure normal to mild range. Fetal monitoring reassuring. One episode of headache resolved with compazine and patient asymptomatic this morning.  2022 - BP well controlled on Procardia 30 XL and patient remains asymptomatic. Elevated blood glucose requiring sliding scale. Two fetal decels noted, however monitoring reassuring for two hours afterwards.   2022 - BP well controlled overnight, asymptomatic. Post-dinner , did not receive SSI due to communication error. Fasting  this AM. NST cat 1, reassuring.   2022 - Normal to mild range BP overnight. Postprandial lunch 201. Insulin regimen increased yesterday to NPH 20/14 and aspart .      Obstetric HPI:  Patient reports no contractions, normal fetal movement, absent vaginal bleeding , absent loss of fluid. Denies headaches, changes in vision, chest pain,  SOB, RUQ pain, or new onset edema.     Objective:     Vital Signs (Most Recent):  Temp: 97.6 °F (36.4 °C) (22)  Pulse: (!) 58 (22)  Resp: 16 (22)  BP: (!) 152/87 (22)  SpO2: 100 % (22) Vital Signs (24h Range):  Temp:  [96.6 °F (35.9 °C)-98.5 °F (36.9 °C)] 97.6 °F (36.4 °C)  Pulse:  [57-80] 58  Resp:  [16] 16  SpO2:  [100 %] 100 %  BP: (124-152)/(76-98) 152/87     Weight: 93.8 kg (206 lb 12.7 oz)  Body mass index is 32.39 kg/m².    FHT: Cat 1, reassuring with baseline 145, moderate accels, no accels, no decels   TOCO:  no CTX     No intake or output data in the 24 hours ending 22 0615    Cervical Exam: deferred     Significant Labs:  Recent Lab Results         22  0549   22  2049   22  1521   22  1056        POCT Glucose 119   144   201   135               Physical Exam:   Constitutional: She is oriented to person, place, and time. She appears well-developed and well-nourished.    HENT:   Head: Normocephalic and atraumatic.    Eyes: EOM are normal.     Cardiovascular: Normal rate.     Pulmonary/Chest: Effort normal.        Abdominal: Soft. There is no abdominal tenderness.             Musculoskeletal: Normal range of motion.       Neurological: She is alert and oriented to person, place, and time.    Skin: Skin is warm and dry.    Psychiatric: She has a normal mood and affect. Her behavior is normal.       Assessment/Plan:     26 y.o. female  at 30w0d for:    * Severe pre-eclampsia in third trimester  - Sustained severe range BP on arrival    - s/p hydral 5/10mg IV in [facility], no further pushes since admission    - S/P MgSO4 initiated for seizure ppx and fetal neuroprotection  - Continue Procardia 30 XL  - PreE labs :   - Cr 0.7   - AST/ALT    - Plt 296   - P:C 0.9   - Will repeat labs q72h  - Plan for inpatient care until delivery at 34 weeks gestation unless indicated earlier    30 weeks gestation of pregnancy  - BMZ  course administered 1/18-19  - Consents signed and placed in chart   - Daily prenatal vitamin  - NST BID  - GBS negative  - Tdap administered  - Vertex on bedside ultrasound  - Growth scan on 1/18: EFW 1,224g  24%, normal ROGELIO. Would repeat growth scan in 3 weeks (02/08)      Anemia during pregnancy in third trimester  - H/H 8.7/30.9 on admit  - PO iron supplement and PNV ordered  - Asymptomatic     COVID-19  - Diagnosed on 1/11/22 however patient states her symptoms start on 1/03  - Asymptomatic     Pregnancy with type 2 diabetes mellitus in third trimester  - -201 over last 24 hours, fasting  this AM   - Insulin increased to NPH 20/14 and aspart 8/4/8 (1/21), increase to NPH 20/18 and aspart 12/4/8  - SSI ordered  - Fasting and 2 hr postprandial blood glucose checks  - Goal BG: fasting <95 and 2hr postprandial <120  - Diabetic diet ordered          Manju Gorman MD  Obstetrics  [facility] - Antepartum ([place])

## 2022-01-22 NOTE — ASSESSMENT & PLAN NOTE
- -201 over last 24 hours, fasting  this AM   - Insulin increased to NPH 20/14 and aspart 8/4/8 (1/21)  - SSI ordered  - Fasting and 2 hr postprandial blood glucose checks  - Goal BG: fasting <95 and 2hr postprandial <120  - Diabetic diet ordered

## 2022-01-22 NOTE — ASSESSMENT & PLAN NOTE
- Sustained severe range BP on arrival    - s/p hydral 5/10mg IV in [facility], no further pushes since admission    - S/P MgSO4 initiated for seizure ppx and fetal neuroprotection  - Continue Procardia 30 XL  - PreE labs 01/20:   - Cr 0.7   - AST/ALT 20/25   - Plt 296   - P:C 0.9   - Will repeat labs q72h  - Plan for inpatient care until delivery at 34 weeks gestation unless indicated earlier

## 2022-01-22 NOTE — ANESTHESIA PROCEDURE NOTES
Spinal    Diagnosis: IUP  Patient location during procedure: OR  Start time: 1/22/2022 4:25 PM  Timeout: 1/22/2022 4:25 PM  End time: 1/22/2022 4:36 PM    Staffing  Authorizing Provider: Kana Stubbs III, MD  Performing Provider: Rickey Wood MD    Preanesthetic Checklist  Completed: patient identified, IV checked, site marked, risks and benefits discussed, surgical consent, monitors and equipment checked, pre-op evaluation and timeout performed  Spinal Block  Patient position: sitting  Prep: ChloraPrep  Patient monitoring: heart rate, cardiac monitor, continuous pulse ox and frequent blood pressure checks  Approach: midline  Location: L4-5  Injection technique: single shot  CSF Fluid: clear free-flowing CSF  Needle  Needle type: Rebecca   Needle gauge: 25 G  Needle length: 5 in  Additional Documentation: incremental injection, negative aspiration for heme and no paresthesia on injection  Needle localization: anatomical landmarks  Assessment  Sensory level: T6   Dermatomal levels determined by pinch or prick  Ease of block: easy  Patient's tolerance of the procedure: comfortable throughout block and no complaints

## 2022-01-22 NOTE — L&D DELIVERY NOTE
Delivery Procedure Note    Date of procedure: 2022    Indications: 26 y.o.  female with IUP 30w0d weeks' gestational age for primary  section for non reassuring fetal heart tones in setting of Pre E with SF    Pre-operative Diagnosis: Fetal Intolerance of Labor     Post-operative Diagnosis: s/p  delivery    Procedure: Primary low transverse  section    Surgeon:   Surgeon(s) and Role:     * Ana Sorto MD - Primary     * Tadeo Anton MD - Resident - Assisting     Assistants: CONNER Anton MD (PGY-2)    Anesthesia: Spinal anesthesia    Findings: Live infant. Normal appearing uterus and bilateral fallopian tubes/ovaries. Left paratubal cyst measuring 1 cm.    Estimated Blood Loss: Calculated QBL (Quantitative Blood Loss) (mL): 485     UOP: I/O this shift:  In: 800 [I.V.:700; IV Piggyback:100]  Out: 485 [Blood:485] ml    Specimens:   Placenta                  Complications:  None; patient tolerated the procedure well.           Disposition: Recovery           Condition: stable    Procedure Details   The risks, benefits, complications, treatment options, and expected outcomes were discussed with the patient upon admission.  The patient concurred with the proposed plan, giving informed consent. The patient was taken to Operating Room, identified as Allison Shaikh, birthdate and  procedure were verified.  A Time Out was held and the above information confirmed.    After anesthesia spinal was found to be adequate, the patient was prepped and draped in the usual sterile fashion in the dorsal supine position with a leftward tilt. A pfannenstiel incision was made in the skin and carried  through the underlying subcutaneous tissue to the level of the  fascia. The fascia was scored at the midline. The fascial incision was then extended transversely using the curved moe scissors. The superior aspect of the fascia was grasped with [facility] clamps x 2 and  from  the underlying rectus tissue superiorly and with the help of the curved moe scissors. Attention was then turned to the inferior aspect of the fascial incision which was grasped and  from the underlying rectus muscle in a similar fashion.The peritoneum was identified, found to be free of adherent bowel and entered bluntly. The peritoneal incision was extended with controlled lateral traction.  A transverse elliptical uterine incision was made with knife and extended with controlled cephalad-caudal traction. The amniotic sac was then entered bluntly and noted to be clear and placenta extruding from hysterotomy. Infant was noted to be in cephalic position, was delivered, and then handed off to the waiting NICU staff.  The umbilical cord was clamped and cut cord blood was obtained for evaluation.     The placenta was removed intact and appeared and sent to pathology. Uterus was noted to be small measuring only about 8 weeks size during uterine closure. The uterus was exteriorized. The uterus, tubes and ovaries were examined and appeared normal. The uterine incision was closed with running locked sutures of 1 Vicryl; an imbricating layer was sewn. Hemostasis was observed. The uterus was returned to the abdominal cavity. Incision was reinspected and good hemostasis was noted. The abdominal cavity was then copiously irrigated and cleared of all clots. The peritoneal incision was then grasped with 2 Roman clamps and re-approximated using 2-0 chromic gut. The muscle was reapproximated with 2-0 Vicryl. The fascia was then reapproximated with running sutures of 0 Vicryl. The subcutaneous layer was reapproximated with 2-0 Plain gut. The skin was reapproximated with 4-0 Monocryl. Sponge, lap and needle counts were correct x 2 prior the abdominal closure and at the conclusion of the case.     Delivery Information for Girl Allison [facility]    Birth information:  YOB: 2021   Time of birth: 4:49 PM   Sex:  female   Head Delivery Date/Time: 2022  4:49 PM   Delivery type: , Low Transverse   Gestational Age: 30w0d    Delivery Providers    Delivering clinician: Ana Sorto MD   Provider Role    Tadeo Anton MD Resident    Arely Finch, RN Circulator    Katelin Bryan, RN Charge Nurse    Cj Nye [facility] Surgical Tech              Measurements    Weight:   Length:          Apgars    Living status: Living  Apgars:  1 min.:  5 min.:  10 min.:  15 min.:  20 min.:    Skin color:  0  1       Heart rate:  2  2       Reflex irritability:  1  2       Muscle tone:  1  2       Respiratory effort:  2  2       Total:  6  9       Apgars assigned by: NICU         Operative Delivery    Forceps attempted?: No  Vacuum extractor attempted?: No         Shoulder Dystocia    Shoulder dystocia present?: No           Presentation    Presentation: Vertex  Position: Left Occiput Anterior           Interventions/Resuscitation    Method: NICU Attended       Cord    Vessels: 3 vessels  Complications: Nuchal  Nuchal Intervention: reduced  Nuchal Cord Description: loose nuchal cord  Number of Loops: 1  Delayed Cord Clamping?: No  Cord Clamped Date/Time: 2022  4:49 PM  Gases Sent?: Yes  Stem Cell Collection (by MD): No       Placenta    Placenta delivery date/time: 2022 1650  Placenta removal: Manual removal  Placenta appearance: Intact  Placenta disposition: pathology           Labor Events:       labor:       Labor Onset Date/Time:         Dilation Complete Date/Time:         Start Pushing Date/Time:       Rupture Date/Time:            Rupture type: INT (Intact)         Fluid Amount:       Fluid Color:        steroids:       Antibiotics given for GBS:       Induction:       Indications for induction:        Augmentation:       Indications for augmentation:       Labor complications:       Additional complications:          Cervical ripening:                     Delivery:       Episiotomy:       Indication for Episiotomy:       Perineal Lacerations:   Repaired:      Periurethral Laceration:   Repaired:     Labial Laceration:   Repaired:     Sulcus Laceration:   Repaired:     Vaginal Laceration:   Repaired:     Cervical Laceration:   Repaired:     Repair suture:       Repair # of packets:       Last Value - EBL - Nursing (mL):       Sum - EBL - Nursing (mL): 0     Last Value - EBL - Anesthesia (mL):      Calculated QBL (mL): 485      Vaginal Sweep Performed:       Surgicount Correct:         Other providers:       Anesthesia    Method: Spinal          Details (if applicable):  Trial of Labor No    Categorization: Primary    Priority: Urgent   Indications for : Fetal Intolerance of Labor   Incision Type: low transverse     Additional  information:  Forceps:    Vacuum:    Breech:    Observed anomalies    Other (Comments):                NOTE: THIS PATIENT IS A CANDIDATE FOR A TRIAL OF LABOR AFTER     CONNER Anton MD  OBGYN PGY-3

## 2022-01-22 NOTE — SUBJECTIVE & OBJECTIVE
Obstetric HPI:  Patient reports no contractions, normal fetal movement, absent vaginal bleeding , absent loss of fluid. Denies headaches, changes in vision, chest pain, SOB, RUQ pain, or new onset edema.     Objective:     Vital Signs (Most Recent):  Temp: 97.6 °F (36.4 °C) (01/22/22 0414)  Pulse: (!) 58 (01/22/22 0414)  Resp: 16 (01/22/22 0414)  BP: (!) 152/87 (01/22/22 0414)  SpO2: 100 % (01/22/22 0414) Vital Signs (24h Range):  Temp:  [96.6 °F (35.9 °C)-98.5 °F (36.9 °C)] 97.6 °F (36.4 °C)  Pulse:  [57-80] 58  Resp:  [16] 16  SpO2:  [100 %] 100 %  BP: (124-152)/(76-98) 152/87     Weight: 93.8 kg (206 lb 12.7 oz)  Body mass index is 32.39 kg/m².    FHT: Cat 1, reassuring with baseline 145, moderate accels, no accels, no decels   TOCO:  no CTX     No intake or output data in the 24 hours ending 01/22/22 0615    Cervical Exam: deferred     Significant Labs:  Recent Lab Results       01/22/22  0549   01/21/22  2049   01/21/22  1521   01/21/22  1056        POCT Glucose 119   144   201   135             Physical Exam:   Constitutional: She is oriented to person, place, and time. She appears well-developed and well-nourished.    HENT:   Head: Normocephalic and atraumatic.    Eyes: EOM are normal.     Cardiovascular: Normal rate.     Pulmonary/Chest: Effort normal.        Abdominal: Soft. There is no abdominal tenderness.             Musculoskeletal: Normal range of motion.       Neurological: She is alert and oriented to person, place, and time.    Skin: Skin is warm and dry.    Psychiatric: She has a normal mood and affect. Her behavior is normal.

## 2022-01-22 NOTE — CARE UPDATE
8/8 BPP  Subtle decel 30 min prior, pt kept on monitor. Approx 1430, another 120s decel. Resolved spontaneously    If decels persist, will move patient to L&D.    CONNER Anton MD  OBGYN PGY-3

## 2022-01-23 LAB
ANISOCYTOSIS BLD QL SMEAR: SLIGHT
BASOPHILS # BLD AUTO: 0.01 K/UL (ref 0–0.2)
BASOPHILS NFR BLD: 0.1 % (ref 0–1.9)
DACRYOCYTES BLD QL SMEAR: ABNORMAL
DIFFERENTIAL METHOD: ABNORMAL
EOSINOPHIL # BLD AUTO: 0 K/UL (ref 0–0.5)
EOSINOPHIL NFR BLD: 0 % (ref 0–8)
ERYTHROCYTE [DISTWIDTH] IN BLOOD BY AUTOMATED COUNT: 19.1 % (ref 11.5–14.5)
HCT VFR BLD AUTO: 31.9 % (ref 37–48.5)
HGB BLD-MCNC: 9.1 G/DL (ref 12–16)
HYPOCHROMIA BLD QL SMEAR: ABNORMAL
IMM GRANULOCYTES # BLD AUTO: 0.08 K/UL (ref 0–0.04)
IMM GRANULOCYTES NFR BLD AUTO: 0.5 % (ref 0–0.5)
LYMPHOCYTES # BLD AUTO: 1.7 K/UL (ref 1–4.8)
LYMPHOCYTES NFR BLD: 9.8 % (ref 18–48)
MAGNESIUM SERPL-MCNC: 5.2 MG/DL (ref 1.6–2.6)
MCH RBC QN AUTO: 17.9 PG (ref 27–31)
MCHC RBC AUTO-ENTMCNC: 28.5 G/DL (ref 32–36)
MCV RBC AUTO: 63 FL (ref 82–98)
MONOCYTES # BLD AUTO: 1.3 K/UL (ref 0.3–1)
MONOCYTES NFR BLD: 7.3 % (ref 4–15)
NEUTROPHILS # BLD AUTO: 14.4 K/UL (ref 1.8–7.7)
NEUTROPHILS NFR BLD: 82.3 % (ref 38–73)
NRBC BLD-RTO: 3 /100 WBC
OVALOCYTES BLD QL SMEAR: ABNORMAL
PLATELET # BLD AUTO: 275 K/UL (ref 150–450)
PLATELET BLD QL SMEAR: ABNORMAL
PMV BLD AUTO: ABNORMAL FL (ref 9.2–12.9)
POCT GLUCOSE: 111 MG/DL (ref 70–110)
POCT GLUCOSE: 118 MG/DL (ref 70–110)
POCT GLUCOSE: 167 MG/DL (ref 70–110)
POCT GLUCOSE: 169 MG/DL (ref 70–110)
POCT GLUCOSE: 202 MG/DL (ref 70–110)
POCT GLUCOSE: 96 MG/DL (ref 70–110)
POIKILOCYTOSIS BLD QL SMEAR: SLIGHT
RBC # BLD AUTO: 5.07 M/UL (ref 4–5.4)
TARGETS BLD QL SMEAR: ABNORMAL
WBC # BLD AUTO: 17.49 K/UL (ref 3.9–12.7)

## 2022-01-23 PROCEDURE — 25000003 PHARM REV CODE 250: Performed by: STUDENT IN AN ORGANIZED HEALTH CARE EDUCATION/TRAINING PROGRAM

## 2022-01-23 PROCEDURE — 36415 COLL VENOUS BLD VENIPUNCTURE: CPT | Performed by: STUDENT IN AN ORGANIZED HEALTH CARE EDUCATION/TRAINING PROGRAM

## 2022-01-23 PROCEDURE — 99232 SBSQ HOSP IP/OBS MODERATE 35: CPT | Mod: ,,, | Performed by: OBSTETRICS & GYNECOLOGY

## 2022-01-23 PROCEDURE — 25000003 PHARM REV CODE 250: Performed by: OBSTETRICS & GYNECOLOGY

## 2022-01-23 PROCEDURE — 63600175 PHARM REV CODE 636 W HCPCS: Performed by: STUDENT IN AN ORGANIZED HEALTH CARE EDUCATION/TRAINING PROGRAM

## 2022-01-23 PROCEDURE — 36415 COLL VENOUS BLD VENIPUNCTURE: CPT

## 2022-01-23 PROCEDURE — 99233 SBSQ HOSP IP/OBS HIGH 50: CPT | Mod: ,,, | Performed by: OBSTETRICS & GYNECOLOGY

## 2022-01-23 PROCEDURE — 99233 PR SUBSEQUENT HOSPITAL CARE,LEVL III: ICD-10-PCS | Mod: ,,, | Performed by: OBSTETRICS & GYNECOLOGY

## 2022-01-23 PROCEDURE — 83735 ASSAY OF MAGNESIUM: CPT

## 2022-01-23 PROCEDURE — 85025 COMPLETE CBC W/AUTO DIFF WBC: CPT | Performed by: STUDENT IN AN ORGANIZED HEALTH CARE EDUCATION/TRAINING PROGRAM

## 2022-01-23 PROCEDURE — 11000001 HC ACUTE MED/SURG PRIVATE ROOM

## 2022-01-23 PROCEDURE — 99232 PR SUBSEQUENT HOSPITAL CARE,LEVL II: ICD-10-PCS | Mod: ,,, | Performed by: OBSTETRICS & GYNECOLOGY

## 2022-01-23 RX ORDER — ENOXAPARIN SODIUM 100 MG/ML
40 INJECTION SUBCUTANEOUS EVERY 24 HOURS
Status: DISCONTINUED | OUTPATIENT
Start: 2022-01-23 | End: 2022-01-25 | Stop reason: HOSPADM

## 2022-01-23 RX ORDER — NIFEDIPINE 30 MG/1
30 TABLET, EXTENDED RELEASE ORAL ONCE
Status: COMPLETED | OUTPATIENT
Start: 2022-01-23 | End: 2022-01-23

## 2022-01-23 RX ORDER — INSULIN ASPART 100 [IU]/ML
4 INJECTION, SOLUTION INTRAVENOUS; SUBCUTANEOUS
Status: DISCONTINUED | OUTPATIENT
Start: 2022-01-23 | End: 2022-01-25 | Stop reason: HOSPADM

## 2022-01-23 RX ORDER — NIFEDIPINE 30 MG/1
60 TABLET, EXTENDED RELEASE ORAL DAILY
Status: DISCONTINUED | OUTPATIENT
Start: 2022-01-24 | End: 2022-01-25 | Stop reason: HOSPADM

## 2022-01-23 RX ORDER — NIFEDIPINE 20 MG/1
20 CAPSULE ORAL ONCE
Status: COMPLETED | OUTPATIENT
Start: 2022-01-23 | End: 2022-01-23

## 2022-01-23 RX ORDER — INSULIN ASPART 100 [IU]/ML
2 INJECTION, SOLUTION INTRAVENOUS; SUBCUTANEOUS
Status: DISCONTINUED | OUTPATIENT
Start: 2022-01-23 | End: 2022-01-25 | Stop reason: HOSPADM

## 2022-01-23 RX ORDER — IBUPROFEN 600 MG/1
600 TABLET ORAL EVERY 6 HOURS
Status: DISCONTINUED | OUTPATIENT
Start: 2022-01-24 | End: 2022-01-23

## 2022-01-23 RX ORDER — SODIUM CHLORIDE, SODIUM LACTATE, POTASSIUM CHLORIDE, CALCIUM CHLORIDE 600; 310; 30; 20 MG/100ML; MG/100ML; MG/100ML; MG/100ML
INJECTION, SOLUTION INTRAVENOUS CONTINUOUS
Status: DISCONTINUED | OUTPATIENT
Start: 2022-01-23 | End: 2022-01-24

## 2022-01-23 RX ORDER — CALCIUM GLUCONATE 98 MG/ML
1 INJECTION, SOLUTION INTRAVENOUS
Status: DISCONTINUED | OUTPATIENT
Start: 2022-01-23 | End: 2022-01-25 | Stop reason: HOSPADM

## 2022-01-23 RX ORDER — IBUPROFEN 600 MG/1
600 TABLET ORAL EVERY 6 HOURS
Status: DISCONTINUED | OUTPATIENT
Start: 2022-01-23 | End: 2022-01-25 | Stop reason: HOSPADM

## 2022-01-23 RX ORDER — MAGNESIUM SULFATE HEPTAHYDRATE 40 MG/ML
4 INJECTION, SOLUTION INTRAVENOUS ONCE
Status: COMPLETED | OUTPATIENT
Start: 2022-01-23 | End: 2022-01-23

## 2022-01-23 RX ORDER — NIFEDIPINE 10 MG/1
10 CAPSULE ORAL ONCE
Status: COMPLETED | OUTPATIENT
Start: 2022-01-23 | End: 2022-01-23

## 2022-01-23 RX ORDER — SODIUM CHLORIDE 0.9 % (FLUSH) 0.9 %
10 SYRINGE (ML) INJECTION
Status: DISCONTINUED | OUTPATIENT
Start: 2022-01-23 | End: 2022-01-25 | Stop reason: HOSPADM

## 2022-01-23 RX ORDER — MAGNESIUM SULFATE HEPTAHYDRATE 40 MG/ML
2 INJECTION, SOLUTION INTRAVENOUS CONTINUOUS
Status: DISCONTINUED | OUTPATIENT
Start: 2022-01-23 | End: 2022-01-24

## 2022-01-23 RX ADMIN — HUMAN INSULIN 10 UNITS: 100 INJECTION, SUSPENSION SUBCUTANEOUS at 07:01

## 2022-01-23 RX ADMIN — KETOROLAC TROMETHAMINE 30 MG: 30 INJECTION, SOLUTION INTRAMUSCULAR; INTRAVENOUS at 06:01

## 2022-01-23 RX ADMIN — ENOXAPARIN SODIUM 40 MG: 100 INJECTION SUBCUTANEOUS at 06:01

## 2022-01-23 RX ADMIN — NIFEDIPINE 30 MG: 30 TABLET, FILM COATED, EXTENDED RELEASE ORAL at 09:01

## 2022-01-23 RX ADMIN — HUMAN INSULIN 7 UNITS: 100 INJECTION, SUSPENSION SUBCUTANEOUS at 08:01

## 2022-01-23 RX ADMIN — Medication 150 MG: at 09:01

## 2022-01-23 RX ADMIN — OXYCODONE 10 MG: 5 TABLET ORAL at 12:01

## 2022-01-23 RX ADMIN — ACETAMINOPHEN 650 MG: 325 TABLET, FILM COATED ORAL at 06:01

## 2022-01-23 RX ADMIN — DOCUSATE SODIUM 200 MG: 100 CAPSULE, LIQUID FILLED ORAL at 09:01

## 2022-01-23 RX ADMIN — SODIUM CHLORIDE, SODIUM LACTATE, POTASSIUM CHLORIDE, AND CALCIUM CHLORIDE 1000 ML: .6; .31; .03; .02 INJECTION, SOLUTION INTRAVENOUS at 11:01

## 2022-01-23 RX ADMIN — KETOROLAC TROMETHAMINE 30 MG: 30 INJECTION, SOLUTION INTRAMUSCULAR; INTRAVENOUS at 01:01

## 2022-01-23 RX ADMIN — NIFEDIPINE 30 MG: 30 TABLET, FILM COATED, EXTENDED RELEASE ORAL at 12:01

## 2022-01-23 RX ADMIN — IBUPROFEN 600 MG: 600 TABLET ORAL at 11:01

## 2022-01-23 RX ADMIN — OXYCODONE HYDROCHLORIDE AND ACETAMINOPHEN 1 TABLET: 5; 325 TABLET ORAL at 08:01

## 2022-01-23 RX ADMIN — INSULIN ASPART 2 UNITS: 100 INJECTION, SOLUTION INTRAVENOUS; SUBCUTANEOUS at 02:01

## 2022-01-23 RX ADMIN — DOCUSATE SODIUM 200 MG: 100 CAPSULE, LIQUID FILLED ORAL at 08:01

## 2022-01-23 RX ADMIN — ACETAMINOPHEN 650 MG: 325 TABLET, FILM COATED ORAL at 01:01

## 2022-01-23 RX ADMIN — KETOROLAC TROMETHAMINE 30 MG: 30 INJECTION, SOLUTION INTRAMUSCULAR; INTRAVENOUS at 12:01

## 2022-01-23 RX ADMIN — INSULIN ASPART 1 UNITS: 100 INJECTION, SOLUTION INTRAVENOUS; SUBCUTANEOUS at 04:01

## 2022-01-23 RX ADMIN — INSULIN ASPART 1 UNITS: 100 INJECTION, SOLUTION INTRAVENOUS; SUBCUTANEOUS at 08:01

## 2022-01-23 RX ADMIN — INSULIN ASPART 4 UNITS: 100 INJECTION, SOLUTION INTRAVENOUS; SUBCUTANEOUS at 10:01

## 2022-01-23 RX ADMIN — NIFEDIPINE 10 MG: 10 CAPSULE ORAL at 12:01

## 2022-01-23 RX ADMIN — INSULIN ASPART 4 UNITS: 100 INJECTION, SOLUTION INTRAVENOUS; SUBCUTANEOUS at 06:01

## 2022-01-23 RX ADMIN — MAGNESIUM SULFATE HEPTAHYDRATE 4 G: 40 INJECTION, SOLUTION INTRAVENOUS at 10:01

## 2022-01-23 RX ADMIN — ACETAMINOPHEN 650 MG: 325 TABLET, FILM COATED ORAL at 12:01

## 2022-01-23 RX ADMIN — OXYCODONE 10 MG: 5 TABLET ORAL at 05:01

## 2022-01-23 RX ADMIN — MAGNESIUM SULFATE IN WATER 2 G/HR: 40 INJECTION, SOLUTION INTRAVENOUS at 11:01

## 2022-01-23 RX ADMIN — NIFEDIPINE 20 MG: 20 CAPSULE ORAL at 02:01

## 2022-01-23 RX ADMIN — INSULIN ASPART 3 UNITS: 100 INJECTION, SOLUTION INTRAVENOUS; SUBCUTANEOUS at 07:01

## 2022-01-23 RX ADMIN — PROCHLORPERAZINE EDISYLATE 5 MG: 5 INJECTION INTRAMUSCULAR; INTRAVENOUS at 08:01

## 2022-01-23 NOTE — PROGRESS NOTES
POSTPARTUM PROGRESS NOTE    Subjective:     PPD/POD#: 1   Procedure: Primary LTCS   EGA: 30w1d   N/V: No   F/C: No   Abd Pain: Mild, well-controlled with oral pain medication   Lochia: Mild   Voiding: Yes, via park   Ambulating: Has not ambulated yet   Bowel fnc: Yes   Breastfeeding: Yes   Contraception: ASK   Circumcision: N/A, female     Objective:      Temp:  [97 °F (36.1 °C)-99.1 °F (37.3 °C)] 98 °F (36.7 °C)  Pulse:  [62-97] 76  Resp:  [12-18] 12  SpO2:  [90 %-100 %] 98 %  BP: (133-168)/() 133/91    Lung: Normal respiratory effort   Abdomen: Soft, appropriately tender   Uterus: Firm, no fundal tenderness   Incision: Bandage in place without shadowing   : Deferred   Extremities: No edema     Lab Review  Recent Labs   Lab 22  1256 22  1300 22  1300 22  1931 22  0333 22  0452   WBC  --  6.52   < >  --  8.26  --  21.18* 17.49*   HGB  --  8.7*   < >  --  9.1*  --  10.1* 9.1*   HCT  --  30.9*   < >  --  33.8*  --  35.9* 31.9*   PLT  --  244   < >  --  296  --  313 275   BUN  --  9  --   --  13  --  11  --    CREATININE  --  0.7   < > 0.6 0.7  --  0.7  --    ALT  --  15  --   --  20  --  15  --    AST  --  27  --   --  25  --  24  --    UTPCR 0.90*  --   --   --   --  1.97*  --   --     < > = values in this interval not displayed.          I/O    Intake/Output Summary (Last 24 hours) at 2022 4578  Last data filed at 2022 1729  Gross per 24 hour   Intake 800 ml   Output 485 ml   Net 315 ml        Assessment and Plan:   1. Postpartum care:  - Patient doing well.  - Continue routine management and advances.    2. PreE w/SF  - BP as above  - asymptomatic  - preE labs as above  - UOP not yet updated in MAR  - Mag: s/p  MgSO4, though needs 24h following delivery  - Hypertensive agent > Procardia 30 XL     3. Type 2 DM  - new diagnoses  - current regimen of NPH 10/9, Aspart   - SSI ordered   - post prandial glucose monitoring        Wendy Gonzales MD  OBGYN PGY-1

## 2022-01-23 NOTE — LACTATION NOTE
01/23/22 1430   Maternal Assessment   Breast Shape Bilateral:;pendulous   Breast Density soft   Areola elastic   Nipples everted   Maternal Infant Feeding   Maternal Emotional State assist needed   Equipment Type   Breast Pump Type double electric, hospital grade   Breast Pump Flange Type hard   Breast Pump Flange Size 27 mm   Breast Pumping   Breast Pumping Interventions frequent pumping encouraged   Breast Pumping hand expression utilized   Lactation Referrals   Lactation Referrals other (see comments)  (NICU LCs)     Situation: initiated lactation consult, pumping milk for baby in NICU    Background: <24 hours postpartum    Assessment:   Pt Mom- expressed 3 times already by pump                Using 27mm shield size    Actions:   1.  Reviewed basics of pumping, hand expression, neurodevelopmental benefits of breastmilk  2. Offered assistance in milk expression.   3.  Taught how to clean pump set parts and properly use the pump.  4.  Encouraged to visit baby in NICU when medically able, currently on Mg drip  5.  Discussed milk handling- storage, labeling and use/ safety prec of medication when expressing milk for NICU baby.       Results: pt agrees to frequent milk expression.

## 2022-01-23 NOTE — ANESTHESIA POSTPROCEDURE EVALUATION
Anesthesia Post Evaluation    Patient: Allison Shaikh    Procedure(s) Performed: Procedure(s) (LRB):   SECTION (N/A)    Final Anesthesia Type: spinal      Patient location during evaluation: floor  Patient participation: Yes- Able to Participate  Level of consciousness: awake and alert  Post-procedure vital signs: reviewed and stable  Pain management: adequate  Airway patency: patent  ALLYSSA mitigation strategies: Use of major conduction anesthesia (spinal/epidural) or peripheral nerve block and Multimodal analgesia  PONV status at discharge: No PONV  Anesthetic complications: no      Cardiovascular status: hemodynamically stable and blood pressure returned to baseline  Respiratory status: unassisted, spontaneous ventilation and room air  Hydration status: euvolemic  Follow-up not needed.          Vitals Value Taken Time   /96 22 0804   Temp 36.6 °C (97.8 °F) 22 08   Pulse 74 22 08   Resp 18 22   SpO2 100 % 22         No case tracking events are documented in the log.      Pain/Brianna Score: Pain Rating Prior to Med Admin: 0 (2022  6:19 AM)  Pain Rating Post Med Admin: 0 (2022  6:00 AM)

## 2022-01-23 NOTE — PROGRESS NOTES
Pt last ate at 1400 and was given her insulin with meal. BS before eating was 65. MD notified. Pt no c/o hypoglycemia. MD order to take BS in 1 hr. The 1hr pp lunch BG was 107 and 2hr pp lunch . MD notified at BS.

## 2022-01-23 NOTE — PLAN OF CARE
Problem: Breastfeeding  Goal: Effective Breastfeeding  Outcome: Ongoing, Progressing     Pump 8 times in 24 hours for baby in NICU, may hand express to help with milk removal.

## 2022-01-24 LAB
POCT GLUCOSE: 108 MG/DL (ref 70–110)
POCT GLUCOSE: 116 MG/DL (ref 70–110)
POCT GLUCOSE: 133 MG/DL (ref 70–110)
POCT GLUCOSE: 148 MG/DL (ref 70–110)
POCT GLUCOSE: 164 MG/DL (ref 70–110)
POCT GLUCOSE: 56 MG/DL (ref 70–110)
POCT GLUCOSE: 72 MG/DL (ref 70–110)

## 2022-01-24 PROCEDURE — 25000003 PHARM REV CODE 250: Performed by: STUDENT IN AN ORGANIZED HEALTH CARE EDUCATION/TRAINING PROGRAM

## 2022-01-24 PROCEDURE — 63600175 PHARM REV CODE 636 W HCPCS: Performed by: STUDENT IN AN ORGANIZED HEALTH CARE EDUCATION/TRAINING PROGRAM

## 2022-01-24 PROCEDURE — 11000001 HC ACUTE MED/SURG PRIVATE ROOM

## 2022-01-24 PROCEDURE — 25000003 PHARM REV CODE 250: Performed by: OBSTETRICS & GYNECOLOGY

## 2022-01-24 PROCEDURE — 99231 SBSQ HOSP IP/OBS SF/LOW 25: CPT | Mod: ,,, | Performed by: OBSTETRICS & GYNECOLOGY

## 2022-01-24 PROCEDURE — 99231 PR SUBSEQUENT HOSPITAL CARE,LEVL I: ICD-10-PCS | Mod: ,,, | Performed by: OBSTETRICS & GYNECOLOGY

## 2022-01-24 RX ORDER — DIPHENHYDRAMINE HYDROCHLORIDE 50 MG/ML
25 INJECTION INTRAMUSCULAR; INTRAVENOUS ONCE
Status: DISCONTINUED | OUTPATIENT
Start: 2022-01-24 | End: 2022-01-25 | Stop reason: HOSPADM

## 2022-01-24 RX ADMIN — IBUPROFEN 600 MG: 600 TABLET ORAL at 05:01

## 2022-01-24 RX ADMIN — INSULIN ASPART 4 UNITS: 100 INJECTION, SOLUTION INTRAVENOUS; SUBCUTANEOUS at 05:01

## 2022-01-24 RX ADMIN — MAGNESIUM SULFATE IN WATER 2 G/HR: 40 INJECTION, SOLUTION INTRAVENOUS at 04:01

## 2022-01-24 RX ADMIN — HUMAN INSULIN 10 UNITS: 100 INJECTION, SUSPENSION SUBCUTANEOUS at 05:01

## 2022-01-24 RX ADMIN — INSULIN ASPART 1 UNITS: 100 INJECTION, SOLUTION INTRAVENOUS; SUBCUTANEOUS at 07:01

## 2022-01-24 RX ADMIN — SODIUM CHLORIDE, SODIUM LACTATE, POTASSIUM CHLORIDE, AND CALCIUM CHLORIDE 1000 ML: .6; .31; .03; .02 INJECTION, SOLUTION INTRAVENOUS at 12:01

## 2022-01-24 RX ADMIN — DOCUSATE SODIUM 200 MG: 100 CAPSULE, LIQUID FILLED ORAL at 11:01

## 2022-01-24 RX ADMIN — DOCUSATE SODIUM 200 MG: 100 CAPSULE, LIQUID FILLED ORAL at 08:01

## 2022-01-24 RX ADMIN — IBUPROFEN 600 MG: 600 TABLET ORAL at 11:01

## 2022-01-24 RX ADMIN — DIPHENHYDRAMINE HYDROCHLORIDE 25 MG: 25 CAPSULE ORAL at 12:01

## 2022-01-24 RX ADMIN — INSULIN ASPART 2 UNITS: 100 INJECTION, SOLUTION INTRAVENOUS; SUBCUTANEOUS at 01:01

## 2022-01-24 RX ADMIN — NIFEDIPINE 60 MG: 30 TABLET, FILM COATED, EXTENDED RELEASE ORAL at 08:01

## 2022-01-24 RX ADMIN — HUMAN INSULIN 7 UNITS: 100 INJECTION, SUSPENSION SUBCUTANEOUS at 09:01

## 2022-01-24 RX ADMIN — Medication 150 MG: at 08:01

## 2022-01-24 RX ADMIN — ENOXAPARIN SODIUM 40 MG: 100 INJECTION SUBCUTANEOUS at 05:01

## 2022-01-24 NOTE — PROGRESS NOTES
Notified Dr. Dash that patient is complaining of new onset of blurry vision. STAT Magnesium level is ordered.

## 2022-01-24 NOTE — PLAN OF CARE
Plan of care reviewed with pt, verbalizes understanding. VSS. Magnesium discontinued this shift. Ambulating and voiding without difficulty. Fundus firm without massage, light bleeding noted. Pt currently pumping. Visited baby in NICU during this shift. Safety measures maintained. NO further concerns noted. Will continue to monitor.

## 2022-01-24 NOTE — PROGRESS NOTES
POSTPARTUM PROGRESS NOTE    Subjective:     POD#: 2   Procedure: Primary LTCS for NRFHTs   EGA: 30w1d   N/V: No   F/C: No   Abd Pain: Mild, well-controlled with oral pain medication   Lochia: Mild   Voiding: Yes spontaneously   Ambulating: Yes   Bowel fnc: Yes - passing flatus   Breastfeeding: Yes   Contraception: ASK   Circumcision: N/A, female     Objective:      Temp:  [97.8 °F (36.6 °C)-98.5 °F (36.9 °C)] 98.4 °F (36.9 °C)  Pulse:  [] 87  Resp:  [12-18] 17  SpO2:  [99 %-100 %] 100 %  BP: (117-176)/() 131/82    Lung: Normal respiratory effort   Abdomen: Soft, appropriately tender   Uterus: Firm, no fundal tenderness   Incision: Clean, dry, and intact.  No erythema, induration, or drainage.   : Deferred   Extremities: No edema     Lab Review  Recent Labs   Lab 01/18/22  1256 01/18/22  1300 01/18/22  1300 01/18/22  1931 01/20/22  0333 01/22/22  2034 01/22/22  2051 01/23/22  0452 01/23/22  2128   WBC  --  6.52   < >  --  8.26  --  21.18* 17.49*  --    HGB  --  8.7*   < >  --  9.1*  --  10.1* 9.1*  --    HCT  --  30.9*   < >  --  33.8*  --  35.9* 31.9*  --    PLT  --  244   < >  --  296  --  313 275  --    BUN  --  9  --   --  13  --  11  --   --    CREATININE  --  0.7   < > 0.6 0.7  --  0.7  --   --    ALT  --  15  --   --  20  --  15  --   --    AST  --  27  --   --  25  --  24  --   --    MG  --   --   --   --   --   --   --   --  5.2*   UTPCR 0.90*  --   --   --   --  1.97*  --   --   --     < > = values in this interval not displayed.          I/O    Intake/Output Summary (Last 24 hours) at 1/24/2022 0636  Last data filed at 1/24/2022 0600  Gross per 24 hour   Intake 2795.92 ml   Output 4500 ml   Net -1704.08 ml        Assessment and Plan:   1. Postpartum care:  - Patient doing well.  - Continue routine management and advances.    2. PreE w/SF  - BP: (117-176)/() 131/82  - asymptomatic  - S/p procardia 10/20 yesterday afternoon due to severe range BP  - UOP: 1200 mL overnight per nursing  -  Mag: continue until completed 24hr postpartum (10 AM)  - Hypertensive agent > increased to Procardia 60 XL     3. Type 2 DM  - new diagnoses  - current regimen of NPH 10/9, Aspart 6/2/4  - SSI ordered   - post prandial glucose monitoring   - Fasting this AM 72      Amado Tariq M.D.  OB/GYN PGY-2    Patient seen and examined. Agree with resident assessment and plan.  POD#2, doing well. Denies preE symptoms.     Temp:  [97.8 °F (36.6 °C)-98.5 °F (36.9 °C)] 97.8 °F (36.6 °C)  Pulse:  [] 102  Resp:  [12-18] 18  SpO2:  [99 %-100 %] 99 %  BP: (117-176)/() 136/91    Abdomen soft, no fundal tenderness  Incision c/d/i    BGs reviewed    Continue routine PP care  Will increase PO antihypertensives as needed  D/C MgSO4 at 24 hours  Increase insulin as needed  Anticipate d/c on POD#4    Sherron Box MD  Maternal Fetal Medicine fellow  PGY-7

## 2022-01-24 NOTE — PROGRESS NOTES
Notified Dr. Dash that Patient is complaining of new onset headache. Compazine IV and Percocet 5 mg given.

## 2022-01-24 NOTE — PROGRESS NOTES
Pumping for the Baby in NICU    Preparation and Hygiene:  Shower daily.  Wear a clean bra each day and wash daily in warm soapy water.  1. Change wet or moist breast pads frequently.  Moist pads can promote growth of germs.  2. Actively wash your hands, paying close attention to the area around and under your fingernails, thoroughly with soap and water for 15 seconds before pumping or handling your milk.  Re-wash your hands if you touch anything (scratching your nose, answering the phone, etc) while pumping or handling your milk.   3. Before pumping your breasts, assemble the pump collection kit and have ready the sterile container and labels.  Place these items on a clean surface next to the breast pump.  4. Each time after you have finished pumping, take apart all of the parts of the breast pump collection kit and place them in a separate cleaning container (do not place them in the sink).  Be sure to remove the yellow valve from the breast shield and separate the white membrane from the yellow valve.  Rinse all of these parts with cool water.  Then use a new sponge and/or bottle brush and dishwashing detergent to clean the parts.  Rinse off the soapy water with cool water and air dry on a clean towel covered with a clean cloth.  All parts may also be washed after each use in the top rack of a .  5. Once each day, sanitize all of the parts of the breast pump collection kit.  This can be done by boiling the kit parts for 10 minutes or by using a Quick Clean Micro-Steam Bag made by [employer]  6. If condensation appears in the tubing, continue to run the pump with the tubing attached for 1-2 minutes or until the tubing is dry.   7. Notify your babys nurse or doctor if you become ill or need to take any medication, even over-the-counter medicines.  Collection and Storage of Expressed Breast milk:       1. Pump your breasts at least 8-10 times every 24 hours.  Double pump (both breasts at the same time)  for at least 15-20 minutes using the most suction that is comfortable.    2. Write the date and time of pumping and the name of any medications you are taking on the babys pre-printed hospital identification label.   3. Place your babys pre-printed hospital identification label on each container of breast milk.  Additional pre-printed labels can be obtained from your babys nurse.  If your expressed breast milk is not correctly labeled, the nurse cannot feed the milk to the baby.       4.    Do not touch the inside of the storage containers or lids.  5.        Pour the amount of expressed milk needed for 1 of your babys feedings into each storage container.  Use a new container(s) for each pumping.  Additional storage containers can be obtained from your babys nurse.  6. Tightly screw the lid onto the container and place immediately into the refrigerator for daily transportation to the hospital.   Do not freeze your milk unless asked to do so by your babys nurse.  However, if you are not able to visit your baby each day, place the expressed breast milk in the freezer.  7.     Expressed breast milk should be refrigerated or frozen within 4 hours of pumping.  8.         Do not store expressed breast milk on the door of your refrigerator or freezer where the temperature is warmer.       [employer] pump, tubing, collections containers and labels brought to bedside.  Discussed proper pump setting of initiation phase.  Instructed on proper usage of pump and to adjust suction according to maximum comfort level.  Verified appropriate flange fit.  Educated on the frequency and duration of pumping in order to promote and maintain a full milk supply.  Hands on pumping technique reviewed.  Encouraged hand expression after pumping.  Instructed on cleaning of breast pump parts.   Pt verbalized understanding and appropriate recall for proper milk handling, collection, labeling, storage and transportation.

## 2022-01-24 NOTE — NURSING
Called to room by pt, pt complaining of slight nose bleed. Pt states she has never had a nose bleed. Charge Nurse Ngozi at bedside. Spoke with , states to continue to monitor pt.

## 2022-01-25 VITALS
HEIGHT: 67 IN | RESPIRATION RATE: 18 BRPM | DIASTOLIC BLOOD PRESSURE: 71 MMHG | WEIGHT: 194 LBS | BODY MASS INDEX: 30.45 KG/M2 | SYSTOLIC BLOOD PRESSURE: 124 MMHG | TEMPERATURE: 98 F | HEART RATE: 96 BPM | OXYGEN SATURATION: 94 %

## 2022-01-25 PROBLEM — Z98.891 S/P PRIMARY LOW TRANSVERSE C-SECTION: Status: ACTIVE | Noted: 2022-01-25

## 2022-01-25 LAB
POCT GLUCOSE: 130 MG/DL (ref 70–110)
POCT GLUCOSE: 59 MG/DL (ref 70–110)
POCT GLUCOSE: 75 MG/DL (ref 70–110)
POCT GLUCOSE: 80 MG/DL (ref 70–110)

## 2022-01-25 PROCEDURE — 99238 PR HOSPITAL DISCHARGE DAY,<30 MIN: ICD-10-PCS | Mod: ,,, | Performed by: OBSTETRICS & GYNECOLOGY

## 2022-01-25 PROCEDURE — 25000003 PHARM REV CODE 250: Performed by: STUDENT IN AN ORGANIZED HEALTH CARE EDUCATION/TRAINING PROGRAM

## 2022-01-25 PROCEDURE — 25000003 PHARM REV CODE 250: Performed by: OBSTETRICS & GYNECOLOGY

## 2022-01-25 PROCEDURE — 99238 HOSP IP/OBS DSCHRG MGMT 30/<: CPT | Mod: ,,, | Performed by: OBSTETRICS & GYNECOLOGY

## 2022-01-25 PROCEDURE — 63600175 PHARM REV CODE 636 W HCPCS: Performed by: STUDENT IN AN ORGANIZED HEALTH CARE EDUCATION/TRAINING PROGRAM

## 2022-01-25 RX ORDER — NIFEDIPINE 60 MG/1
60 TABLET, EXTENDED RELEASE ORAL DAILY
Qty: 30 TABLET | Refills: 11 | Status: SHIPPED | OUTPATIENT
Start: 2022-01-26 | End: 2023-01-26

## 2022-01-25 RX ORDER — IRON POLYSACCHARIDE COMPLEX 150 MG
150 CAPSULE ORAL
Qty: 30 CAPSULE | Refills: 1 | Status: SHIPPED | OUTPATIENT
Start: 2022-01-25

## 2022-01-25 RX ORDER — INSULIN HUMAN 100 [IU]/ML
10 INJECTION, SUSPENSION SUBCUTANEOUS
Qty: 40 ML | Refills: 0 | Status: SHIPPED | OUTPATIENT
Start: 2022-01-26 | End: 2023-01-26

## 2022-01-25 RX ORDER — DOCUSATE SODIUM 100 MG/1
200 CAPSULE, LIQUID FILLED ORAL 2 TIMES DAILY
Qty: 30 CAPSULE | Refills: 1 | Status: SHIPPED | OUTPATIENT
Start: 2022-01-25

## 2022-01-25 RX ORDER — IBUPROFEN 600 MG/1
600 TABLET ORAL EVERY 6 HOURS
Qty: 30 TABLET | Refills: 1 | Status: SHIPPED | OUTPATIENT
Start: 2022-01-25

## 2022-01-25 RX ORDER — INSULIN ASPART 100 [IU]/ML
INJECTION, SOLUTION INTRAVENOUS; SUBCUTANEOUS
Qty: 15 ML | Refills: 0 | Status: SHIPPED | OUTPATIENT
Start: 2022-01-25

## 2022-01-25 RX ORDER — OXYCODONE AND ACETAMINOPHEN 5; 325 MG/1; MG/1
1 TABLET ORAL EVERY 4 HOURS PRN
Qty: 15 TABLET | Refills: 0 | Status: SHIPPED | OUTPATIENT
Start: 2022-01-25

## 2022-01-25 RX ADMIN — NIFEDIPINE 60 MG: 30 TABLET, FILM COATED, EXTENDED RELEASE ORAL at 08:01

## 2022-01-25 RX ADMIN — INSULIN ASPART 4 UNITS: 100 INJECTION, SOLUTION INTRAVENOUS; SUBCUTANEOUS at 08:01

## 2022-01-25 RX ADMIN — HUMAN INSULIN 10 UNITS: 100 INJECTION, SUSPENSION SUBCUTANEOUS at 08:01

## 2022-01-25 RX ADMIN — IBUPROFEN 600 MG: 600 TABLET ORAL at 05:01

## 2022-01-25 RX ADMIN — INSULIN ASPART 2 UNITS: 100 INJECTION, SOLUTION INTRAVENOUS; SUBCUTANEOUS at 12:01

## 2022-01-25 RX ADMIN — IBUPROFEN 600 MG: 600 TABLET ORAL at 12:01

## 2022-01-25 RX ADMIN — Medication 150 MG: at 08:01

## 2022-01-25 RX ADMIN — DOCUSATE SODIUM 200 MG: 100 CAPSULE, LIQUID FILLED ORAL at 08:01

## 2022-01-25 RX ADMIN — OXYCODONE HYDROCHLORIDE AND ACETAMINOPHEN 1 TABLET: 5; 325 TABLET ORAL at 05:01

## 2022-01-25 NOTE — NURSING
At 0735, patient reports feeling like her blood sugar is low, POCT glucose 75 at 0740. I gave her a cup of orange juice and she said she has ordered breakfast. I notified Dr. Blount and she said to go ahead and give the NPH and Aspart insulin as ordered with breakfast.  Will continue to monitor closely.

## 2022-01-25 NOTE — PLAN OF CARE
VSS. Feeding independently. Ambulating and voiding without difficulty. Pain well controlled with PO pain meds. Incision intact with no signs of infection. Insulin given for sugar of 164. No acute events this shift. No additional needs at this time.

## 2022-01-25 NOTE — PLAN OF CARE
Copied from infant chart:     SOCIAL WORK DISCHARGE PLANNING ASSESSMENT    Sw completed discharge planning assessment with pt's mother via telephone [phone] .  Pt's mother was easily engaged. Education on the role of  was provided. Emotional support provided throughout assessment.      Legal Name: Destiney Edmond  :  2021  Address: [address] [place] [address]  Parent's Phone Numbers: Allison [phone] and Jet [phone]     Pediatrician: [facility]     Education: Information given on NICU Education Classes; Physician/NNP daily rounds; and Postpartum Depression signs.   Potential Eligibility for SSI Benefits: Yes. Sw to provide diagnosis letter for application process.      Patient Active Problem List   Diagnosis    Prematurity, 1,000-1,249 grams, 29-30 completed weeks     respiratory distress syndrome    Need for observation and evaluation of  for sepsis    At risk for apnea    Infant of diabetic mother         Birth Hospital:[facility]   MARIYA: 2021    Birth Weight: 1.16 kg (2 lb 8.9 oz)  Birth Length: 37.0cm   Gestational Age: 30w0d          Apgars    Living status: Living  Apgars:  1 min.:  5 min.:  10 min.:  15 min.:  20 min.:    Skin color:  0  1       Heart rate:  2  2       Reflex irritability:  1  2       Muscle tone:  1  2       Respiratory effort:  2  2       Total:  6  9       Apgars assigned by: NICU            22 1038   NICU Assessment   Assessment Type Discharge Planning Assessment   Source of Information family   Verified Demographic and Insurance Information Yes   Insurance Medicaid   Lives With mother;father   Number people in home 3 including patient   Relationship Status of Parents In relationship   Mother Employed Full Time   Mother's Employer [employer]   Highest Level of Education Some College   Currently Enrolled in School No   Father's Involvement Fully Involved   Is Father signing the birth  certificate Yes   Father Name and  Jet Edmond 1996   Father Currently Enrolled in School No   Father's Employer [employer]   Infant Feeding Plan breastfeeding;formula feeding   Breast Pump Needed no   Does baby have crib or safe sleep space? Yes   Do you have a car seat? Yes   Resource/Environmental Concerns none   Resources/Education Provided My Preemi Cezar;My NICU Baby Cezar;Preparing for Your Baby's Discharge Home;Early Intervention Program;Post Partum Depression;Support Resources for NICU Families;SSI Benefits;Glossary of Commonly Used Terms;WIC   DME Needed Upon Discharge  none   Discharge Plan A Home with family;Early Steps   Do you have any problems affording any of your prescribed medications? No

## 2022-01-25 NOTE — PROGRESS NOTES
POSTPARTUM PROGRESS NOTE    Subjective:     POD#: 3   Procedure: Primary LTCS for NRFHTs   EGA: 30w1d   N/V: No   F/C: No   Abd Pain: Mild, well-controlled with oral pain medication   Lochia: Mild   Voiding: Yes spontaneously   Ambulating: Yes   Bowel fnc: Yes - passing flatus   Breastfeeding: Yes   Contraception: DepoProvera prior to discharge   Circumcision: N/A, female     Objective:      Temp:  [97.8 °F (36.6 °C)-99.5 °F (37.5 °C)] 99.5 °F (37.5 °C)  Pulse:  [] 99  Resp:  [14-18] 16  SpO2:  [98 %-100 %] 100 %  BP: (123-149)/(71-97) 124/71    Lung: Normal respiratory effort   Abdomen: Soft, appropriately tender   Uterus: Firm, no fundal tenderness   Incision: Clean, dry, and intact.  No erythema, induration, or drainage.   : Deferred   Extremities: No edema     Lab Review  Recent Labs   Lab 01/18/22  1256 01/18/22  1300 01/18/22  1300 01/18/22  1931 01/20/22  0333 01/22/22  2034 01/22/22  2051 01/23/22  0452 01/23/22  2128   WBC  --  6.52   < >  --  8.26  --  21.18* 17.49*  --    HGB  --  8.7*   < >  --  9.1*  --  10.1* 9.1*  --    HCT  --  30.9*   < >  --  33.8*  --  35.9* 31.9*  --    PLT  --  244   < >  --  296  --  313 275  --    BUN  --  9  --   --  13  --  11  --   --    CREATININE  --  0.7   < > 0.6 0.7  --  0.7  --   --    ALT  --  15  --   --  20  --  15  --   --    AST  --  27  --   --  25  --  24  --   --    MG  --   --   --   --   --   --   --   --  5.2*   UTPCR 0.90*  --   --   --   --  1.97*  --   --   --     < > = values in this interval not displayed.          I/O    Intake/Output Summary (Last 24 hours) at 1/25/2022 0633  Last data filed at 1/24/2022 0800  Gross per 24 hour   Intake --   Output 1250.62 ml   Net -1250.62 ml        Assessment and Plan:   1. Postpartum care:  - Patient doing well.  - Continue routine management and advances.    2. PreE w/SF  - BP: (123-149)/(71-97) 124/71  - asymptomatic  - Mag: continue until completed 24hr postpartum (10 AM)  - Hypertensive agent >  continue Procardia 60 XL     3. Type 2 DM  - new diagnoses this pregnancy  - A1c 13.2 on 1/11/22  - current regimen of NPH 10/9, Aspart 6/2/4  - Will consider increasing dinner Aspart to 6 units for better control  - SSI ordered   - post prandial glucose monitoring   - Fasting this AM 80      Amado Tariq M.D.  OB/GYN PGY-2      Patient seen and examined. Agree with resident assessment and plan.  POD#3, doing well. Denies preE symptoms. Requests discharge home.     Temp:  [98.1 °F (36.7 °C)-99.5 °F (37.5 °C)] 98.1 °F (36.7 °C)  Pulse:  [80-99] 80  Resp:  [14-18] 18  SpO2:  [98 %-100 %] 100 %  BP: (120-135)/(71-91) 120/85    Abdomen soft, no fundal tenderness  Incision c/d/i    Continue routine PP care  BPs stable. Continue PO regimen  Continue insulin regimen. BGs stable.  D/C home today. Discharge precautions given. F/U with primary OB in 1 week for BP check. F/U with endocrine as outpatient.    Sherron Box MD  Maternal Fetal Medicine fellow  PGY-7

## 2022-01-25 NOTE — LACTATION NOTE
01/25/22 0915   Maternal Assessment   Breast Shape Bilateral:;pendulous   Breast Density soft   Areola elastic   Nipples everted   Maternal Infant Feeding   Maternal Emotional State independent   Latch Assistance yes   Equipment Type   Breast Pump Type double electric, hospital grade   Breast Pump Flange Type hard   Breast Pump Flange Size 27 mm   Breast Pumping   Breast Pumping Interventions frequent pumping encouraged   Lactation Referrals   Lactation Referrals outpatient lactation program     Situation: continuity of lactation consult, pumping milk for baby in NICU    Background: day 3 postpartum    Assessment:   Pt Mom- expressed 4-5 times already by pump              Using 27 mm shield size  She has lansinoh electric pump, has 25mm shields    Actions:   1.  Reviewed basics of pumping, neurodevelopmental benefits of breastmilk  2. Offered assistance in milk expression, to call LC.  3.  Taught how to clean pump set parts and properly use the pump, sterilization and milk trasnportation  4.  Encouraged to visit baby in NICU.  5.  Discussed milk handling- storage, labeling and use/ safety prec of medication when expressing milk for NICU baby.   6.  Discharge teaching done, coached on how to use personal pump at home.      Results: pt agrees to frequent milk expression.  Acknowledged discharge teaching

## 2022-01-25 NOTE — DISCHARGE SUMMARY
Delivery Discharge Summary  Obstetrics      Primary OB Clinician: Aaron Bernal MD     Admission date: 2022  Discharge date: 2022    Disposition: To home, self care    Discharge Diagnosis List:      Patient Active Problem List   Diagnosis    Pregnancy with type 2 diabetes mellitus in third trimester    COVID-19    Hyponatremia    Elevated blood pressure affecting pregnancy in third trimester, antepartum    Severe pre-eclampsia in third trimester    Anemia during pregnancy in third trimester    30 weeks gestation of pregnancy    S/P primary low transverse        Procedure: , due to Critical access hospital    Hospital Course:  Allison Shaikh is a 26 y.o. now , POD #3 who was admitted on 2022 at 29w3d to the antepartum service for inpatient management of Preeclampsia with severe features and Type 2 DM.     On hospital day #4, recurrent late decelerations with minimal variability was noted on fetal monitoring and decision was made to proceed with urgent, primary LTCS, which was performed without complications.    Please see delivery note for further details. Her postpartum course was uncomplicated. On discharge day, patient's pain is controlled with oral pain medications. Her blood pressure is well controlled with Procardia 60 XL. Current insuline regimen includes NPH 10/7 and Aspart 6/2/4. Pt is tolerating ambulation without SOB or CP, and regular diet without N/V. Reports lochia is mild. Denies any HA, vision changes, F/C, LE swelling. Denies any breast pain/soreness.    Pt in stable condition and ready for discharge. She has been instructed to start and/or continue medications and follow up with her obstetrics provider as listed below.    Pertinent studies:  CBC  Recent Labs   Lab 22  0333 22  2051 22  0452   WBC 8.26 21.18* 17.49*   HGB 9.1* 10.1* 9.1*   HCT 33.8* 35.9* 31.9*   MCV 65* 64* 63*    313 275          Immunization History   Administered  Date(s) Administered    DTaP 1996, 1997, 1997, 1998, 2001    HIB 1996, 1997, 1997, 1998    HPV Quadrivalent 2011, 2012, 2013    Hepatitis A, Pediatric/Adolescent, 2 Dose 2015    Hepatitis B, Pediatric/Adolescent 1996, 1996, 1997    IPV 1996, 1997, 1997, 1998, 2001    Influenza - Quadrivalent - PF *Preferred* (6 months and older) 2011    MMR 1998, 2001    Meningococcal Conjugate (MCV4P) 2008, 2013    Pneumococcal Conjugate - 7 Valent 2001    Pneumococcal Polysaccharide - 23 Valent 2001    Tdap 2008    Varicella 1998, 2008        Delivery:    Episiotomy:     Lacerations:     Repair suture:     Repair # of packets:     Blood loss (ml):       Birth information:  YOB: 2021   Time of birth: 4:49 PM   Sex: female   Delivery type: , Low Transverse   Gestational Age: 30w0d    Delivery Clinician:      Other providers:       Additional  information:  Forceps:    Vacuum:    Breech:    Observed anomalies      Living?:           APGARS  One minute Five minutes Ten minutes   Skin color:         Heart rate:         Grimace:         Muscle tone:         Breathing:         Totals: 6  9        Placenta: Delivered:       appearance      Patient Instructions:   Current Discharge Medication List      START taking these medications    Details   docusate sodium (COLACE) 100 MG capsule Take 2 capsules (200 mg total) by mouth 2 (two) times daily.  Qty: 30 capsule, Refills: 1      ibuprofen (ADVIL,MOTRIN) 600 MG tablet Take 1 tablet (600 mg total) by mouth every 6 (six) hours.  Qty: 30 tablet, Refills: 1      iron polysaccharides (NIFEREX) 150 mg iron Cap Take 1 capsule (150 mg total) by mouth every 48 hours.  Qty: 30 capsule, Refills: 1      NIFEdipine (PROCARDIA-XL) 60 MG (OSM) 24 hr tablet Take 1 tablet (60 mg total) by  "mouth once daily.  Qty: 30 tablet, Refills: 11    Comments: .      oxyCODONE-acetaminophen (PERCOCET) 5-325 mg per tablet Take 1 tablet by mouth every 4 (four) hours as needed for Pain.  Qty: 15 tablet, Refills: 0    Comments: Quantity prescribed more than 7 day supply? No         CONTINUE these medications which have CHANGED    Details   insulin aspart U-100 (NOVOLOG) 100 unit/mL (3 mL) InPn pen Inject 6 units prior to breakfast, 2 units prior to lunch, and 4 units prior to dinner.  Qty: 15 mL, Refills: 0      !! insulin NPH (HUMULIN N NPH U-100 INSULIN) 100 unit/mL injection Inject 10 Units into the skin before breakfast.  Qty: 36 mL, Refills: 0      !! insulin  unit/mL injection Inject 7 Units into the skin every evening.  Qty: 25.2 mL, Refills: 0       !! - Potential duplicate medications found. Please discuss with provider.      CONTINUE these medications which have NOT CHANGED    Details   alcohol swabs PadM APPLY AS DIRECTED BEFORE TESTING BLOOD SUGAR  Qty: 100 each, Refills: 0      blood sugar diagnostic (TRUE METRIX GLUCOSE TEST STRIP) Strp TEST 4 TIMES DAILY AS DIRECTED  Qty: 100 each, Refills: 3      blood-glucose meter (TRUE METRIX GLUCOSE METER) Misc USE AS DIRECTED TO TEST BLOOD SUGAR  Qty: 1 each, Refills: 0      insulin syringe-needle U-100 0.5 mL 29 gauge x 1/2" Syrg To be used with NPH insulin every AM  Qty: 100 each, Refills: 0      lancets (TRUEPLUS LANCETS) 30 gauge Misc TEST BLOOD SUGAR 4 TIMES DAILY  Qty: 100 each, Refills: 3      pen needle, diabetic 31 gauge x 3/16" Ndle To be used with insulin pen 3 times daily  Qty: 100 each, Refills: 0      prenatal no122/iron/folic acid (PRENATAL MULTI ORAL) Take 1 tablet by mouth once daily.         STOP taking these medications       levonorgestrel-ethinyl estradiol (AVIANE,ALESSE,LESSINA) 0.1-20 mg-mcg per tablet Comments:   Reason for Stopping:               Discharge Procedure Orders   BREAST PUMP FOR HOME USE     Order Specific Question " Answer Comments   Type of pump: Electric    Weight: 93.8 kg (206 lb 12.7 oz)    Length of need (1-99 months): 99      Ambulatory referral/consult to Endocrinology   Standing Status: Future   Referral Priority: Routine Referral Type: Consultation   Requested Specialty: Endocrinology   Number of Visits Requested: 1     Diet Adult Regular     No driving until:   Order Comments: Until not taking narcotic pain medication.     Pelvic Rest   Order Comments: Pelvic rest for at least 6 weeks. Nothing in vagina - no sex, tampons, or douching for 6 weeks     Notify your health care provider if you experience any of the following:  temperature >100.4     Notify your health care provider if you experience any of the following:  persistent nausea and vomiting or diarrhea     Notify your health care provider if you experience any of the following:  severe uncontrolled pain     Notify your health care provider if you experience any of the following:  redness, tenderness, or signs of infection (pain, swelling, redness, odor or green/yellow discharge around incision site)     Notify your health care provider if you experience any of the following:  difficulty breathing or increased cough     Notify your health care provider if you experience any of the following:  severe persistent headache     Notify your health care provider if you experience any of the following:  worsening rash     Notify your health care provider if you experience any of the following:  persistent dizziness, light-headedness, or visual disturbances     Notify your health care provider if you experience any of the following:  increased confusion or weakness     Notify your health care provider if you experience any of the following:   Order Comments: Heavy vaginal bleeding saturating more than 1 pad per hour for at least 2 hours.     Activity as tolerated        Follow-up Information     Gerald Bernal MD In 1 week.    Specialty: Obstetrics and  Gynecology  Why: BP check  Contact information:  [address]  [place]  [place] [id number] [phone]             Gerald Bernal MD In 6 weeks.    Specialty: Obstetrics and Gynecology  Why: Postpartum visit  Contact information:  [address] [place]  [place] [address]  [phone]             [facility] ENDOCRINOLOGY.    Specialty: Endocrinology  Why: Type 2 diabetes care  Contact information:  [address] [facility] [address]  [phone]                        Amado Tariq M.D.  OB/GYN PGY-2

## 2022-01-25 NOTE — PLAN OF CARE
Ambulating independently, voiding without difficulty, pain controlled with around the clock dosing and abdominal binder provided; Patient bonding well with infant in NICU. Patient is using breast pump and volume of expressed breast milk is increasing.  Discharge instructions discussed with patient and family, including when to follow up with her OB/GYN and Endocrinologist and her current home medications.  Patient and family verbalize understanding.  Patient is waiting for her prescriptions to be delivered from outpatient pharmacy, and then will be able to leave the unit.

## 2022-01-26 ENCOUNTER — PATIENT MESSAGE (OUTPATIENT)
Dept: ORTHOPEDICS | Facility: CLINIC | Age: 27
End: 2022-01-26
Payer: MEDICAID

## 2022-01-29 LAB
FINAL PATHOLOGIC DIAGNOSIS: NORMAL
GROSS: NORMAL
Lab: NORMAL

## 2022-01-31 ENCOUNTER — POSTPARTUM VISIT (OUTPATIENT)
Dept: OBSTETRICS AND GYNECOLOGY | Facility: CLINIC | Age: 27
End: 2022-01-31
Payer: MEDICAID

## 2022-01-31 VITALS
HEIGHT: 67 IN | DIASTOLIC BLOOD PRESSURE: 80 MMHG | SYSTOLIC BLOOD PRESSURE: 100 MMHG | BODY MASS INDEX: 30.1 KG/M2 | WEIGHT: 191.81 LBS

## 2022-01-31 DIAGNOSIS — Z01.30 BLOOD PRESSURE CHECK: ICD-10-CM

## 2022-01-31 PROCEDURE — 99213 OFFICE O/P EST LOW 20 MIN: CPT | Mod: PBBFAC,TH | Performed by: NURSE PRACTITIONER

## 2022-01-31 PROCEDURE — 59430 PR CARE AFTER DELIVERY ONLY: ICD-10-PCS | Mod: CPTII,,, | Performed by: NURSE PRACTITIONER

## 2022-01-31 PROCEDURE — 99999 PR PBB SHADOW E&M-EST. PATIENT-LVL III: CPT | Mod: PBBFAC,,, | Performed by: NURSE PRACTITIONER

## 2022-01-31 PROCEDURE — 99999 PR PBB SHADOW E&M-EST. PATIENT-LVL III: ICD-10-PCS | Mod: PBBFAC,,, | Performed by: NURSE PRACTITIONER

## 2022-01-31 NOTE — PROGRESS NOTES
History & Physical  Gynecology      SUBJECTIVE:     Chief Complaint:   Postpartum Care (B/p mood check)       History of Present Illness  Allison Shaikh is a 26 y.o. female   presents for post partum BP and BG recheck. Reports giving birth  (2022). Pregnancy complicated by new onset diabetes, preE,  delivery. Denies headache, blurred vision, dizziness, swelling, RUQ abdominal pain, CP, SOB. Compliant with Nifedipine. Reports al BG  range, no log with her today and states not actively taking insulin currently. Daughter is in NICU and doing well, bonding reported.     BP Readings from Last 2 Encounters:   22 100/80   22 124/71          Review of patient's allergies indicates:  No Known Allergies    Past Medical History:   Diagnosis Date    Anxiety     Asthma     Eczema      Past Surgical History:   Procedure Laterality Date     SECTION N/A 2022    Procedure:  SECTION;  Surgeon: Ana Sorto MD;  Location: [facility] L&[facility];  Service: OB/GYN;  Laterality: N/A;     OB History        2    Para   1    Term   0       1    AB   1    Living   1       SAB   1    IAB   0    Ectopic   0    Multiple   0    Live Births   1               Family History   Problem Relation Age of Onset    Ovarian cancer Paternal Grandmother     Breast cancer Neg Hx     Cancer Neg Hx     Colon cancer Neg Hx     Diabetes Neg Hx     Eclampsia Neg Hx     Hypertension Neg Hx     Miscarriages / Stillbirths Neg Hx      labor Neg Hx     Stroke Neg Hx      Social History     Tobacco Use    Smoking status: Never Smoker    Smokeless tobacco: Never Used   Substance Use Topics    Alcohol use: No    Drug use: No       Current Outpatient Medications   Medication Sig    alcohol swabs PadM APPLY AS DIRECTED BEFORE TESTING BLOOD SUGAR    blood sugar diagnostic (TRUE METRIX GLUCOSE TEST STRIP) Strp TEST 4 TIMES DAILY AS DIRECTED    blood-glucose meter (TRUE  "METRIX GLUCOSE METER) [facility] USE AS DIRECTED TO TEST BLOOD SUGAR    docusate sodium (COLACE) 100 MG capsule Take 2 capsules (200 mg total) by mouth 2 (two) times daily.    ibuprofen (ADVIL,MOTRIN) 600 MG tablet Take 1 tablet (600 mg total) by mouth every 6 (six) hours.    insulin aspart U-100 (NOVOLOG) 100 unit/mL (3 mL) InPn pen Inject 6 units prior to breakfast, 2 units prior to lunch, and 4 units prior to dinner.    insulin NPH (HUMULIN N NPH U-100 INSULIN) 100 unit/mL injection Inject 10 Units into the skin before breakfast.    insulin  unit/mL injection Inject 7 Units into the skin every evening.    insulin syringe-needle U-100 0.5 mL 29 gauge x 1/2" Syrg To be used with NPH insulin every AM    iron polysaccharides (NIFEREX) 150 mg iron Cap Take 1 capsule (150 mg total) by mouth every 48 hours.    lancets (TRUEPLUS LANCETS) 30 gauge Misc TEST BLOOD SUGAR 4 TIMES DAILY    NIFEdipine (PROCARDIA-XL) 60 MG (OSM) 24 hr tablet Take 1 tablet (60 mg total) by mouth once daily.    oxyCODONE-acetaminophen (PERCOCET) 5-325 mg per tablet Take 1 tablet by mouth every 4 (four) hours as needed for Pain.    pen needle, diabetic 31 gauge x 3/16" Ndle To be used with insulin pen 3 times daily    prenatal no122/iron/folic acid (PRENATAL MULTI ORAL) Take 1 tablet by mouth once daily.     No current facility-administered medications for this visit.         Review of Systems:  Review of Systems   Constitutional: Negative for activity change, appetite change, chills, fatigue and fever.   HENT: Negative for mouth sores.    Eyes: Negative for visual disturbance.   Respiratory: Negative for cough and shortness of breath.    Cardiovascular: Negative for chest pain and leg swelling.   Gastrointestinal: Negative for abdominal pain, constipation, diarrhea, nausea, vomiting and reflux.   Endocrine: Negative for hyperthyroidism and hypothyroidism.   Genitourinary: Negative for dysuria, flank pain, frequency, genital sores, " hematuria, menstrual problem, pelvic pain, vaginal bleeding, vaginal discharge, vaginal pain, vaginal dryness and vaginal odor.   Musculoskeletal: Negative for arthralgias, back pain and myalgias.   Integumentary:  Negative for rash, breast mass and breast tenderness.   Neurological: Negative for headaches.   Hematological: Negative for adenopathy. Does not bruise/bleed easily.   Psychiatric/Behavioral: Negative for depression. The patient is not nervous/anxious.    Breast: Negative for asymmetry, mass and tenderness       OBJECTIVE:     Physical Exam:  Physical Exam  Vitals and nursing note reviewed.   Constitutional:       Appearance: Normal appearance.   HENT:      Head: Normocephalic and atraumatic.      Nose: Nose normal.      Mouth/Throat:      Mouth: Mucous membranes are moist.   Eyes:      Conjunctiva/sclera: Conjunctivae normal.   Cardiovascular:      Rate and Rhythm: Normal rate.   Pulmonary:      Effort: Pulmonary effort is normal.      Breath sounds: Normal breath sounds.   Abdominal:      Palpations: Abdomen is soft.   Genitourinary:     Comments: Deferred  Musculoskeletal:         General: Normal range of motion.      Cervical back: Normal range of motion.   Skin:     General: Skin is warm and dry.   Neurological:      General: No focal deficit present.      Mental Status: She is alert.   Psychiatric:         Mood and Affect: Mood normal.         Behavior: Behavior normal.         Thought Content: Thought content normal.         Judgment: Judgment normal.           ASSESSMENT:       ICD-10-CM ICD-9-CM    1. Encounter for postpartum care of lactating mother  Z39.1 V24.1    2. Diabetes mellitus in pregnancy, postpartum condition  O24.93 648.04      250.00    3. Blood pressure check  Z01.30 V81.1           Plan:      BP in WNL, instructed to continue prescribed Procardia. Strict preE precautions given. Reports all BG in range, however no log provided and not taking Insulin at this time. Instructed to  provide a log of BG readings via portal. Given numbers to schedule endocrinology appt in addition to her PCP appt next week. Strict ED precautions given.    FU at [facility] as needed.    Counseling time: 20 minutes      PADMINI Gracia

## 2022-06-17 ENCOUNTER — PATIENT MESSAGE (OUTPATIENT)
Dept: ADMINISTRATIVE | Facility: HOSPITAL | Age: 27
End: 2022-06-17
Payer: MEDICAID

## 2022-07-10 ENCOUNTER — HOSPITAL ENCOUNTER (EMERGENCY)
Facility: HOSPITAL | Age: 27
Discharge: HOME | End: 2022-07-10
Attending: EMERGENCY MEDICINE
Payer: MEDICAID

## 2022-07-10 VITALS
HEART RATE: 91 BPM | WEIGHT: 190 LBS | RESPIRATION RATE: 16 BRPM | BODY MASS INDEX: 29.82 KG/M2 | HEIGHT: 67 IN | DIASTOLIC BLOOD PRESSURE: 74 MMHG | OXYGEN SATURATION: 100 % | TEMPERATURE: 98 F | SYSTOLIC BLOOD PRESSURE: 110 MMHG
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
## 2022-07-10 DIAGNOSIS — R51.9 ACUTE INTRACTABLE HEADACHE, UNSPECIFIED HEADACHE TYPE: Primary | ICD-10-CM

## 2022-07-10 DIAGNOSIS — M54.2 NECK PAIN: ICD-10-CM

## 2022-07-10 LAB
B-HCG UR QL: NEGATIVE
CTP QC/QA: YES

## 2022-07-10 PROCEDURE — 81025 URINE PREGNANCY TEST: CPT | Performed by: EMERGENCY MEDICINE

## 2022-07-10 PROCEDURE — 25000003 PHARM REV CODE 250: Performed by: EMERGENCY MEDICINE

## 2022-07-10 PROCEDURE — 99283 EMERGENCY DEPT VISIT LOW MDM: CPT

## 2022-07-10 RX ORDER — ONDANSETRON 4 MG/1
4 TABLET, ORALLY DISINTEGRATING ORAL
Status: COMPLETED | OUTPATIENT
Start: 2022-07-10 | End: 2022-07-10

## 2022-07-10 RX ORDER — BUTALBITAL, ACETAMINOPHEN AND CAFFEINE 50; 325; 40 MG/1; MG/1; MG/1
1 TABLET ORAL
Status: COMPLETED | OUTPATIENT
Start: 2022-07-10 | End: 2022-07-10

## 2022-07-10 RX ADMIN — ONDANSETRON 4 MG: 4 TABLET, ORALLY DISINTEGRATING ORAL at 03:07

## 2022-07-10 RX ADMIN — BUTALBITAL, ACETAMINOPHEN AND CAFFEINE 1 TABLET: 50; 325; 40 TABLET ORAL at 03:07
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
## 2022-07-15 ENCOUNTER — PATIENT OUTREACH (OUTPATIENT)
Dept: EMERGENCY MEDICINE | Facility: HOSPITAL | Age: 27
End: 2022-07-15

## 2022-07-15 NOTE — PROGRESS NOTES
Patient advised that she left the hospital after staying for 6 hours without being seen. She declined ED navigation assessment and denied any needs at this time.

## 2022-10-19 ENCOUNTER — LAB VISIT (OUTPATIENT)
Dept: LAB | Facility: HOSPITAL | Age: 27
End: 2022-10-19
Attending: INTERNAL MEDICINE
Payer: MEDICAID

## 2022-10-19 DIAGNOSIS — E11.69 DIABETES MELLITUS ASSOCIATED WITH HORMONAL ETIOLOGY: Primary | ICD-10-CM

## 2022-10-19 LAB
ANION GAP SERPL CALC-SCNC: 8 MMOL/L (ref 8–16)
BACTERIA #/AREA URNS HPF: NEGATIVE /HPF
BILIRUB UR QL STRIP: NEGATIVE
BUN SERPL-MCNC: 7 MG/DL (ref 6–20)
CALCIUM SERPL-MCNC: 9.4 MG/DL (ref 8.7–10.5)
CHLORIDE SERPL-SCNC: 103 MMOL/L (ref 95–110)
CLARITY UR: CLEAR
CO2 SERPL-SCNC: 25 MMOL/L (ref 23–29)
COLOR UR: YELLOW
CREAT SERPL-MCNC: 0.5 MG/DL (ref 0.5–1.4)
EST. GFR  (NO RACE VARIABLE): >60 ML/MIN/1.73 M^2
ESTIMATED AVG GLUCOSE: 306 MG/DL (ref 68–131)
GLUCOSE SERPL-MCNC: 279 MG/DL (ref 70–110)
GLUCOSE UR QL STRIP: ABNORMAL
HBA1C MFR BLD: 12.3 % (ref 4.5–6.2)
HGB UR QL STRIP: NEGATIVE
HYALINE CASTS #/AREA URNS LPF: 3 /LPF
KETONES UR QL STRIP: NEGATIVE
LEUKOCYTE ESTERASE UR QL STRIP: ABNORMAL
MICROSCOPIC COMMENT: ABNORMAL
NITRITE UR QL STRIP: NEGATIVE
PH UR STRIP: 7 [PH] (ref 5–8)
POTASSIUM SERPL-SCNC: 3.9 MMOL/L (ref 3.5–5.1)
PROT UR QL STRIP: NEGATIVE
RBC #/AREA URNS HPF: 3 /HPF (ref 0–4)
SODIUM SERPL-SCNC: 136 MMOL/L (ref 136–145)
SP GR UR STRIP: 1.01 (ref 1–1.03)
SQUAMOUS #/AREA URNS HPF: 7 /HPF
URN SPEC COLLECT METH UR: ABNORMAL
UROBILINOGEN UR STRIP-ACNC: NEGATIVE EU/DL
WBC #/AREA URNS HPF: 7 /HPF (ref 0–5)
YEAST URNS QL MICRO: ABNORMAL

## 2022-10-19 PROCEDURE — 83036 HEMOGLOBIN GLYCOSYLATED A1C: CPT | Performed by: INTERNAL MEDICINE

## 2022-10-19 PROCEDURE — 81001 URINALYSIS AUTO W/SCOPE: CPT | Performed by: INTERNAL MEDICINE

## 2022-10-19 PROCEDURE — 80048 BASIC METABOLIC PNL TOTAL CA: CPT | Performed by: INTERNAL MEDICINE

## 2022-10-19 PROCEDURE — 36415 COLL VENOUS BLD VENIPUNCTURE: CPT | Performed by: INTERNAL MEDICINE

## 2023-01-02 PROBLEM — Z3A.29 29 WEEKS GESTATION OF PREGNANCY: Status: RESOLVED | Noted: 2022-01-19 | Resolved: 2023-01-02

## 2024-06-14 ENCOUNTER — PATIENT OUTREACH (OUTPATIENT)
Dept: ADMINISTRATIVE | Facility: HOSPITAL | Age: 29
End: 2024-06-14
Payer: COMMERCIAL

## 2024-08-14 ENCOUNTER — LAB VISIT (OUTPATIENT)
Dept: LAB | Facility: HOSPITAL | Age: 29
End: 2024-08-14
Payer: COMMERCIAL

## 2024-08-14 ENCOUNTER — OFFICE VISIT (OUTPATIENT)
Dept: INTERNAL MEDICINE | Facility: CLINIC | Age: 29
End: 2024-08-14
Payer: COMMERCIAL

## 2024-08-14 ENCOUNTER — PATIENT MESSAGE (OUTPATIENT)
Dept: INTERNAL MEDICINE | Facility: CLINIC | Age: 29
End: 2024-08-14

## 2024-08-14 VITALS
SYSTOLIC BLOOD PRESSURE: 116 MMHG | OXYGEN SATURATION: 100 % | RESPIRATION RATE: 18 BRPM | WEIGHT: 168 LBS | BODY MASS INDEX: 26.37 KG/M2 | HEART RATE: 93 BPM | TEMPERATURE: 99 F | DIASTOLIC BLOOD PRESSURE: 80 MMHG | HEIGHT: 67 IN

## 2024-08-14 DIAGNOSIS — Z13.220 SCREENING CHOLESTEROL LEVEL: ICD-10-CM

## 2024-08-14 DIAGNOSIS — Z79.4 TYPE 2 DIABETES MELLITUS TREATED WITH INSULIN: Primary | ICD-10-CM

## 2024-08-14 DIAGNOSIS — L30.8 OTHER ECZEMA: ICD-10-CM

## 2024-08-14 DIAGNOSIS — E11.9 TYPE 2 DIABETES MELLITUS TREATED WITH INSULIN: Primary | ICD-10-CM

## 2024-08-14 DIAGNOSIS — E11.9 TYPE 2 DIABETES MELLITUS TREATED WITH INSULIN: ICD-10-CM

## 2024-08-14 DIAGNOSIS — Z79.4 TYPE 2 DIABETES MELLITUS TREATED WITH INSULIN: ICD-10-CM

## 2024-08-14 LAB
ALBUMIN SERPL BCP-MCNC: 3.8 G/DL (ref 3.5–5.2)
ALBUMIN/CREAT UR: 18.8 UG/MG (ref 0–30)
ALP SERPL-CCNC: 100 U/L (ref 55–135)
ALT SERPL W/O P-5'-P-CCNC: 11 U/L (ref 10–44)
ANION GAP SERPL CALC-SCNC: 10 MMOL/L (ref 8–16)
AST SERPL-CCNC: 13 U/L (ref 10–40)
BASOPHILS # BLD AUTO: 0.03 K/UL (ref 0–0.2)
BASOPHILS NFR BLD: 0.8 % (ref 0–1.9)
BILIRUB SERPL-MCNC: 0.4 MG/DL (ref 0.1–1)
BUN SERPL-MCNC: 9 MG/DL (ref 6–20)
CALCIUM SERPL-MCNC: 8.9 MG/DL (ref 8.7–10.5)
CHLORIDE SERPL-SCNC: 104 MMOL/L (ref 95–110)
CHOLEST SERPL-MCNC: 141 MG/DL (ref 120–199)
CHOLEST/HDLC SERPL: 2.5 {RATIO} (ref 2–5)
CO2 SERPL-SCNC: 23 MMOL/L (ref 23–29)
CREAT SERPL-MCNC: 0.8 MG/DL (ref 0.5–1.4)
CREAT UR-MCNC: 117 MG/DL (ref 15–325)
DIFFERENTIAL METHOD BLD: ABNORMAL
EOSINOPHIL # BLD AUTO: 0 K/UL (ref 0–0.5)
EOSINOPHIL NFR BLD: 1 % (ref 0–8)
ERYTHROCYTE [DISTWIDTH] IN BLOOD BY AUTOMATED COUNT: 17.9 % (ref 11.5–14.5)
EST. GFR  (NO RACE VARIABLE): >60 ML/MIN/1.73 M^2
ESTIMATED AVG GLUCOSE: 301 MG/DL (ref 68–131)
GLUCOSE SERPL-MCNC: 236 MG/DL (ref 70–110)
HBA1C MFR BLD: 12.1 % (ref 4–5.6)
HCT VFR BLD AUTO: 42.2 % (ref 37–48.5)
HDLC SERPL-MCNC: 56 MG/DL (ref 40–75)
HDLC SERPL: 39.7 % (ref 20–50)
HGB BLD-MCNC: 12.6 G/DL (ref 12–16)
IMM GRANULOCYTES # BLD AUTO: 0.01 K/UL (ref 0–0.04)
IMM GRANULOCYTES NFR BLD AUTO: 0.3 % (ref 0–0.5)
LDLC SERPL CALC-MCNC: 74.2 MG/DL (ref 63–159)
LYMPHOCYTES # BLD AUTO: 1.5 K/UL (ref 1–4.8)
LYMPHOCYTES NFR BLD: 37.9 % (ref 18–48)
MCH RBC QN AUTO: 23.1 PG (ref 27–31)
MCHC RBC AUTO-ENTMCNC: 29.9 G/DL (ref 32–36)
MCV RBC AUTO: 77 FL (ref 82–98)
MICROALBUMIN UR DL<=1MG/L-MCNC: 22 UG/ML
MONOCYTES # BLD AUTO: 0.3 K/UL (ref 0.3–1)
MONOCYTES NFR BLD: 8 % (ref 4–15)
NEUTROPHILS # BLD AUTO: 2.1 K/UL (ref 1.8–7.7)
NEUTROPHILS NFR BLD: 52 % (ref 38–73)
NONHDLC SERPL-MCNC: 85 MG/DL
NRBC BLD-RTO: 0 /100 WBC
PLATELET # BLD AUTO: 318 K/UL (ref 150–450)
PMV BLD AUTO: 11.9 FL (ref 9.2–12.9)
POTASSIUM SERPL-SCNC: 4 MMOL/L (ref 3.5–5.1)
PROT SERPL-MCNC: 7.7 G/DL (ref 6–8.4)
RBC # BLD AUTO: 5.45 M/UL (ref 4–5.4)
SODIUM SERPL-SCNC: 137 MMOL/L (ref 136–145)
TRIGL SERPL-MCNC: 54 MG/DL (ref 30–150)
WBC # BLD AUTO: 3.98 K/UL (ref 3.9–12.7)

## 2024-08-14 PROCEDURE — 1159F MED LIST DOCD IN RCRD: CPT | Mod: CPTII,S$GLB,, | Performed by: NURSE PRACTITIONER

## 2024-08-14 PROCEDURE — 80053 COMPREHEN METABOLIC PANEL: CPT | Performed by: NURSE PRACTITIONER

## 2024-08-14 PROCEDURE — 82570 ASSAY OF URINE CREATININE: CPT | Performed by: NURSE PRACTITIONER

## 2024-08-14 PROCEDURE — 1160F RVW MEDS BY RX/DR IN RCRD: CPT | Mod: CPTII,S$GLB,, | Performed by: NURSE PRACTITIONER

## 2024-08-14 PROCEDURE — 83036 HEMOGLOBIN GLYCOSYLATED A1C: CPT | Performed by: NURSE PRACTITIONER

## 2024-08-14 PROCEDURE — 99204 OFFICE O/P NEW MOD 45 MIN: CPT | Mod: S$GLB,,, | Performed by: NURSE PRACTITIONER

## 2024-08-14 PROCEDURE — 85025 COMPLETE CBC W/AUTO DIFF WBC: CPT | Performed by: NURSE PRACTITIONER

## 2024-08-14 PROCEDURE — 3074F SYST BP LT 130 MM HG: CPT | Mod: CPTII,S$GLB,, | Performed by: NURSE PRACTITIONER

## 2024-08-14 PROCEDURE — 3079F DIAST BP 80-89 MM HG: CPT | Mod: CPTII,S$GLB,, | Performed by: NURSE PRACTITIONER

## 2024-08-14 PROCEDURE — 36415 COLL VENOUS BLD VENIPUNCTURE: CPT | Performed by: NURSE PRACTITIONER

## 2024-08-14 PROCEDURE — 3008F BODY MASS INDEX DOCD: CPT | Mod: CPTII,S$GLB,, | Performed by: NURSE PRACTITIONER

## 2024-08-14 PROCEDURE — 99999 PR PBB SHADOW E&M-EST. PATIENT-LVL V: CPT | Mod: PBBFAC,,, | Performed by: NURSE PRACTITIONER

## 2024-08-14 PROCEDURE — 80061 LIPID PANEL: CPT | Performed by: NURSE PRACTITIONER

## 2024-08-14 NOTE — PROGRESS NOTES
"Subjective     Patient ID: Allison Shaikh is a 28 y.o. female.    Chief Complaint: Diabetes and ecezema     Pt new to me, here for, "Diabetes and eczema".    Was last seeing a doctor at "[facility]" in [place], [facility] insurances    Was on Metformin 1000mg BID but has been off of it for over a year    Was on insulin during a pregnancy in 2022    Has been lost to follow up on diabetes. Does not recall last A1C. Reviewed record, last A1C on 10-19-22 was 12.3. States BS at home running 200-400s. Here for labs.    Also here for eczema. Never was prescribed anything in the past. Recently has been acting up in both arms and chest. She has been using otc products      Review of Systems   Constitutional:  Negative for activity change, appetite change and unexpected weight change.   HENT:  Negative for dental problem and hearing loss.    Eyes:  Negative for visual disturbance.   Respiratory:  Negative for apnea, cough, chest tightness and shortness of breath.    Cardiovascular:  Negative for chest pain, palpitations and leg swelling.   Gastrointestinal:  Negative for abdominal distention, abdominal pain, anal bleeding, blood in stool, constipation, diarrhea, nausea, rectal pain and vomiting.   Endocrine: Negative for cold intolerance, heat intolerance, polydipsia, polyphagia and polyuria.   Genitourinary:  Negative for difficulty urinating, hematuria, menstrual problem, pelvic pain and vaginal pain.   Musculoskeletal:  Negative for arthralgias.   Integumentary:  Positive for rash. Negative for color change.        As documented in HPI     Allergic/Immunologic: Negative for environmental allergies, food allergies and immunocompromised state.   Neurological:  Negative for dizziness, speech difficulty and weakness.   Hematological:  Negative for adenopathy. Does not bruise/bleed easily.   Psychiatric/Behavioral:  Negative for agitation, behavioral problems, sleep disturbance and suicidal ideas.             Review of " patient's allergies indicates:  No Known Allergies    No current outpatient medications on file.    Patient Active Problem List   Diagnosis    Pregnancy with type 2 diabetes mellitus in third trimester    COVID-19    Hyponatremia    Elevated blood pressure affecting pregnancy in third trimester, antepartum    Severe pre-eclampsia in third trimester    Anemia during pregnancy in third trimester    S/P primary low transverse     Eczema       Past Medical History:   Diagnosis Date    Anxiety     Asthma     Eczema        Past Surgical History:   Procedure Laterality Date     SECTION N/A 2022    Procedure:  SECTION;  Surgeon: Ana Sorto MD;  Location: [facility];  Service: OB/GYN;  Laterality: N/A;     SECTION  2022       Social History     Socioeconomic History    Marital status: Single   Tobacco Use    Smoking status: Never    Smokeless tobacco: Never   Substance and Sexual Activity    Alcohol use: Never    Drug use: No    Sexual activity: Yes     Partners: Male     Birth control/protection: Condom     Social Determinants of Health     Financial Resource Strain: Patient Declined (2024)    Overall Financial Resource Strain (CARDIA)     Difficulty of Paying Living Expenses: Patient declined   Food Insecurity: Patient Declined (2024)    Hunger Vital Sign     Worried About Running Out of Food in the Last Year: Patient declined     Ran Out of Food in the Last Year: Patient declined   Physical Activity: Unknown (2024)    Exercise Vital Sign     Days of Exercise per Week: 0 days   Stress: Stress Concern Present (2024)    [employer] - Occupational Stress Questionnaire     Feeling of Stress : Rather much   Housing Stability: Unknown (2024)    Housing Stability Vital Sign     Unable to Pay for Housing in the Last Year: No       Family History   Problem Relation Name Age of Onset    Ovarian cancer Paternal Grandmother       "Asthma Mother Na     Diabetes Mother Na     Kidney disease Mother Na     Breast cancer Neg Hx      Cancer Neg Hx      Colon cancer Neg Hx      Eclampsia Neg Hx      Hypertension Neg Hx      Miscarriages / Stillbirths Neg Hx       labor Neg Hx      Stroke Neg Hx         Objective     Vitals:    24 1140   BP: 116/80   Pulse: 93   Resp: 18   Temp: 98.6 °F (37 °C)   TempSrc: Oral   SpO2: 100%   Weight: 76.2 kg (167 lb 15.9 oz)   Height: 5' 7" (1.702 m)   PainSc: 0-No pain       Body mass index is 26.31 kg/m².    Physical Exam  Vitals and nursing note reviewed.   Constitutional:       Appearance: Normal appearance.   HENT:      Head: Normocephalic.      Nose: Nose normal.      Mouth/Throat:      Mouth: Mucous membranes are moist.   Eyes:      Conjunctiva/sclera: Conjunctivae normal.   Cardiovascular:      Rate and Rhythm: Normal rate and regular rhythm.      Heart sounds: Normal heart sounds.   Pulmonary:      Effort: Pulmonary effort is normal.      Breath sounds: Normal breath sounds.   Musculoskeletal:         General: Normal range of motion.      Cervical back: Normal range of motion.   Skin:     Findings: Rash present. Rash is macular, scaling and urticarial.          Neurological:      General: No focal deficit present.      Mental Status: She is alert and oriented to person, place, and time.   Psychiatric:         Mood and Affect: Mood normal.         Behavior: Behavior normal.         Thought Content: Thought content normal.         Judgment: Judgment normal.          Assessment and Plan     1. Type 2 diabetes mellitus treated with insulin  -     Hemoglobin A1C; Future; Expected date: 2024  -     Lipid Panel; Future; Expected date: 2024  -     Comprehensive Metabolic Panel; Future; Expected date: 2024  -     CBC Auto Differential; Future; Expected date: 2024  -     Microalbumin/Creatinine Ratio, Urine; Future; Expected date: 2024  -     Ambulatory referral/consult to " Endocrinology; Future; Expected date: 08/14/2024    2. Other eczema  -     Ambulatory referral/consult to Dermatology; Future; Expected date: 08/14/2024    3. Screening cholesterol level  -     Lipid Panel; Future; Expected date: 08/14/2024    4. BMI 26.0-26.9,adult  BMI reviewed    Check labs today to see where you are with your diabetes    Referral to Dermatology for eczema    Referral to Endocrinology for diabetes follow up and management    Follow with one of MDs I work with who can be your new PCP for your annual: Dr. Gaby Singer, Dr. Sven Solano, Dr. Les Montiel, Dr.Melvin Justice, Dr. Charan Couch, Dr. Jey Webb who is my practice partner         Follow up if symptoms worsen or fail to improve.

## 2024-08-14 NOTE — PATIENT INSTRUCTIONS
Check labs today to see where you are with your diabetes    Referral to Dermatology for eczema    Referral to Endocrinology for diabetes follow up and management    Follow with one of MDs I work with who can be your new PCP for your annual: Dr. Gaby Singer, Dr. Sven Solano, Dr. Les Montiel, Dr.Melvin Justice, Dr. Charan Couch, Dr. Jey Webb who is my practice partner